# Patient Record
Sex: FEMALE | Race: BLACK OR AFRICAN AMERICAN | NOT HISPANIC OR LATINO | URBAN - METROPOLITAN AREA
[De-identification: names, ages, dates, MRNs, and addresses within clinical notes are randomized per-mention and may not be internally consistent; named-entity substitution may affect disease eponyms.]

---

## 2023-04-04 ENCOUNTER — INPATIENT (INPATIENT)
Facility: HOSPITAL | Age: 73
LOS: 10 days | Discharge: HOME | End: 2023-04-15
Attending: STUDENT IN AN ORGANIZED HEALTH CARE EDUCATION/TRAINING PROGRAM | Admitting: STUDENT IN AN ORGANIZED HEALTH CARE EDUCATION/TRAINING PROGRAM
Payer: MEDICARE

## 2023-04-04 VITALS
OXYGEN SATURATION: 98 % | RESPIRATION RATE: 20 BRPM | HEART RATE: 99 BPM | DIASTOLIC BLOOD PRESSURE: 80 MMHG | SYSTOLIC BLOOD PRESSURE: 125 MMHG | TEMPERATURE: 97 F

## 2023-04-04 LAB
ALBUMIN SERPL ELPH-MCNC: 4.1 G/DL — SIGNIFICANT CHANGE UP (ref 3.3–5)
ALP SERPL-CCNC: 133 U/L — HIGH (ref 40–120)
ALT FLD-CCNC: 72 U/L — HIGH (ref 4–33)
ANION GAP SERPL CALC-SCNC: 17 MMOL/L — HIGH (ref 7–14)
APTT BLD: 26.9 SEC — LOW (ref 27–36.3)
AST SERPL-CCNC: 64 U/L — HIGH (ref 4–32)
B PERT DNA SPEC QL NAA+PROBE: SIGNIFICANT CHANGE UP
B PERT+PARAPERT DNA PNL SPEC NAA+PROBE: SIGNIFICANT CHANGE UP
BASE EXCESS BLDV CALC-SCNC: -1.5 MMOL/L — SIGNIFICANT CHANGE UP (ref -2–3)
BASOPHILS # BLD AUTO: 0.01 K/UL — SIGNIFICANT CHANGE UP (ref 0–0.2)
BASOPHILS NFR BLD AUTO: 0.1 % — SIGNIFICANT CHANGE UP (ref 0–2)
BILIRUB SERPL-MCNC: 1 MG/DL — SIGNIFICANT CHANGE UP (ref 0.2–1.2)
BLOOD GAS VENOUS COMPREHENSIVE RESULT: SIGNIFICANT CHANGE UP
BORDETELLA PARAPERTUSSIS (RAPRVP): SIGNIFICANT CHANGE UP
BUN SERPL-MCNC: 35 MG/DL — HIGH (ref 7–23)
C PNEUM DNA SPEC QL NAA+PROBE: SIGNIFICANT CHANGE UP
CALCIUM SERPL-MCNC: 9.7 MG/DL — SIGNIFICANT CHANGE UP (ref 8.4–10.5)
CHLORIDE BLDV-SCNC: 99 MMOL/L — SIGNIFICANT CHANGE UP (ref 96–108)
CHLORIDE SERPL-SCNC: 96 MMOL/L — LOW (ref 98–107)
CO2 BLDV-SCNC: 22.2 MMOL/L — SIGNIFICANT CHANGE UP (ref 22–26)
CO2 SERPL-SCNC: 21 MMOL/L — LOW (ref 22–31)
CREAT SERPL-MCNC: 2.01 MG/DL — HIGH (ref 0.5–1.3)
EGFR: 26 ML/MIN/1.73M2 — LOW
EOSINOPHIL # BLD AUTO: 0.02 K/UL — SIGNIFICANT CHANGE UP (ref 0–0.5)
EOSINOPHIL NFR BLD AUTO: 0.2 % — SIGNIFICANT CHANGE UP (ref 0–6)
FLUAV SUBTYP SPEC NAA+PROBE: SIGNIFICANT CHANGE UP
FLUBV RNA SPEC QL NAA+PROBE: SIGNIFICANT CHANGE UP
GAS PNL BLDV: 130 MMOL/L — LOW (ref 136–145)
GLUCOSE BLDV-MCNC: 125 MG/DL — HIGH (ref 70–99)
GLUCOSE SERPL-MCNC: 129 MG/DL — HIGH (ref 70–99)
HADV DNA SPEC QL NAA+PROBE: SIGNIFICANT CHANGE UP
HCO3 BLDV-SCNC: 21 MMOL/L — LOW (ref 22–29)
HCOV 229E RNA SPEC QL NAA+PROBE: SIGNIFICANT CHANGE UP
HCOV HKU1 RNA SPEC QL NAA+PROBE: SIGNIFICANT CHANGE UP
HCOV NL63 RNA SPEC QL NAA+PROBE: SIGNIFICANT CHANGE UP
HCOV OC43 RNA SPEC QL NAA+PROBE: SIGNIFICANT CHANGE UP
HCT VFR BLD CALC: 41.4 % — SIGNIFICANT CHANGE UP (ref 34.5–45)
HCT VFR BLDA CALC: 42 % — SIGNIFICANT CHANGE UP (ref 34.5–46.5)
HGB BLD CALC-MCNC: 13.9 G/DL — SIGNIFICANT CHANGE UP (ref 11.7–16.1)
HGB BLD-MCNC: 13.7 G/DL — SIGNIFICANT CHANGE UP (ref 11.5–15.5)
HMPV RNA SPEC QL NAA+PROBE: SIGNIFICANT CHANGE UP
HPIV1 RNA SPEC QL NAA+PROBE: SIGNIFICANT CHANGE UP
HPIV2 RNA SPEC QL NAA+PROBE: SIGNIFICANT CHANGE UP
HPIV3 RNA SPEC QL NAA+PROBE: SIGNIFICANT CHANGE UP
HPIV4 RNA SPEC QL NAA+PROBE: SIGNIFICANT CHANGE UP
IANC: 6.04 K/UL — SIGNIFICANT CHANGE UP (ref 1.8–7.4)
IMM GRANULOCYTES NFR BLD AUTO: 0.4 % — SIGNIFICANT CHANGE UP (ref 0–0.9)
INR BLD: 1.29 RATIO — HIGH (ref 0.88–1.16)
LACTATE BLDV-MCNC: 2.2 MMOL/L — HIGH (ref 0.5–2)
LYMPHOCYTES # BLD AUTO: 1.18 K/UL — SIGNIFICANT CHANGE UP (ref 1–3.3)
LYMPHOCYTES # BLD AUTO: 14.3 % — SIGNIFICANT CHANGE UP (ref 13–44)
M PNEUMO DNA SPEC QL NAA+PROBE: SIGNIFICANT CHANGE UP
MCHC RBC-ENTMCNC: 27 PG — SIGNIFICANT CHANGE UP (ref 27–34)
MCHC RBC-ENTMCNC: 33.1 GM/DL — SIGNIFICANT CHANGE UP (ref 32–36)
MCV RBC AUTO: 81.7 FL — SIGNIFICANT CHANGE UP (ref 80–100)
MONOCYTES # BLD AUTO: 0.98 K/UL — HIGH (ref 0–0.9)
MONOCYTES NFR BLD AUTO: 11.9 % — SIGNIFICANT CHANGE UP (ref 2–14)
NEUTROPHILS # BLD AUTO: 6.04 K/UL — SIGNIFICANT CHANGE UP (ref 1.8–7.4)
NEUTROPHILS NFR BLD AUTO: 73.1 % — SIGNIFICANT CHANGE UP (ref 43–77)
NRBC # BLD: 0 /100 WBCS — SIGNIFICANT CHANGE UP (ref 0–0)
NRBC # FLD: 0 K/UL — SIGNIFICANT CHANGE UP (ref 0–0)
NT-PROBNP SERPL-SCNC: 61 PG/ML — SIGNIFICANT CHANGE UP
PCO2 BLDV: 30 MMHG — LOW (ref 39–52)
PH BLDV: 7.46 — HIGH (ref 7.32–7.43)
PLATELET # BLD AUTO: 345 K/UL — SIGNIFICANT CHANGE UP (ref 150–400)
PO2 BLDV: 23 MMHG — LOW (ref 25–45)
POTASSIUM BLDV-SCNC: 3.1 MMOL/L — LOW (ref 3.5–5.1)
POTASSIUM SERPL-MCNC: 3.3 MMOL/L — LOW (ref 3.5–5.3)
POTASSIUM SERPL-SCNC: 3.3 MMOL/L — LOW (ref 3.5–5.3)
PROT SERPL-MCNC: 7.5 G/DL — SIGNIFICANT CHANGE UP (ref 6–8.3)
PROTHROM AB SERPL-ACNC: 15 SEC — HIGH (ref 10.5–13.4)
RAPID RVP RESULT: SIGNIFICANT CHANGE UP
RBC # BLD: 5.07 M/UL — SIGNIFICANT CHANGE UP (ref 3.8–5.2)
RBC # FLD: 12.8 % — SIGNIFICANT CHANGE UP (ref 10.3–14.5)
RSV RNA SPEC QL NAA+PROBE: SIGNIFICANT CHANGE UP
RV+EV RNA SPEC QL NAA+PROBE: SIGNIFICANT CHANGE UP
SAO2 % BLDV: 34.4 % — LOW (ref 67–88)
SARS-COV-2 RNA SPEC QL NAA+PROBE: SIGNIFICANT CHANGE UP
SODIUM SERPL-SCNC: 134 MMOL/L — LOW (ref 135–145)
TROPONIN T, HIGH SENSITIVITY RESULT: 14 NG/L — SIGNIFICANT CHANGE UP
TROPONIN T, HIGH SENSITIVITY RESULT: 19 NG/L — SIGNIFICANT CHANGE UP
WBC # BLD: 8.26 K/UL — SIGNIFICANT CHANGE UP (ref 3.8–10.5)
WBC # FLD AUTO: 8.26 K/UL — SIGNIFICANT CHANGE UP (ref 3.8–10.5)

## 2023-04-04 PROCEDURE — 74177 CT ABD & PELVIS W/CONTRAST: CPT | Mod: 26,MA

## 2023-04-04 PROCEDURE — 71275 CT ANGIOGRAPHY CHEST: CPT | Mod: 26,MA

## 2023-04-04 PROCEDURE — 71045 X-RAY EXAM CHEST 1 VIEW: CPT | Mod: 26

## 2023-04-04 PROCEDURE — 99285 EMERGENCY DEPT VISIT HI MDM: CPT

## 2023-04-04 RX ORDER — MORPHINE SULFATE 50 MG/1
4 CAPSULE, EXTENDED RELEASE ORAL ONCE
Refills: 0 | Status: DISCONTINUED | OUTPATIENT
Start: 2023-04-04 | End: 2023-04-04

## 2023-04-04 RX ORDER — SODIUM CHLORIDE 9 MG/ML
1000 INJECTION INTRAMUSCULAR; INTRAVENOUS; SUBCUTANEOUS ONCE
Refills: 0 | Status: COMPLETED | OUTPATIENT
Start: 2023-04-04 | End: 2023-04-04

## 2023-04-04 RX ADMIN — MORPHINE SULFATE 4 MILLIGRAM(S): 50 CAPSULE, EXTENDED RELEASE ORAL at 19:00

## 2023-04-04 RX ADMIN — MORPHINE SULFATE 4 MILLIGRAM(S): 50 CAPSULE, EXTENDED RELEASE ORAL at 18:27

## 2023-04-04 RX ADMIN — SODIUM CHLORIDE 1000 MILLILITER(S): 9 INJECTION INTRAMUSCULAR; INTRAVENOUS; SUBCUTANEOUS at 19:39

## 2023-04-04 NOTE — ED ADULT NURSE NOTE - CHIEF COMPLAINT QUOTE
Pt with co abdominal pain x few days recently had colonoscopy diagnosed with colon mass . Pt has oncology appointment on the 19 of april at [facility] but states wants to  start her chemo here at [facility]. Pt also reports sob and does sabrina tachypneic in triage. Pt with HX of copd.

## 2023-04-04 NOTE — ED ADULT TRIAGE NOTE - CHIEF COMPLAINT QUOTE
Pt with co abdominal pain x few days recently had colonoscopy diagnosed with colon mass . Pt has oncology appointment on the 19 of april at [facility] but states wants to  start her chemo here at [facility]. Pt also reports sob and does sabrina tachypneic in triage. Pt with HX of copd.

## 2023-04-04 NOTE — ED ADULT NURSE NOTE - PRIMARY CARE PROVIDER
"GLIMEPIRIDE 4 MG TABLET   Last Written Prescription Date:  3/9/2020  Last Fill Quantity: 60,  # refills: 0   Last office visit: 9/12/2019 with prescribing provider:  Geovany Perkins MD   Future Office Visit:  NA      Requested Prescriptions   Pending Prescriptions Disp Refills     glimepiride (AMARYL) 4 MG tablet [Pharmacy Med Name: GLIMEPIRIDE 4 MG TABLET] 60 tablet 0     Sig: TAKE 2 TABLETS (8 MG) BY MOUTH EVERY MORNING (BEFORE BREAKFAST)       Sulfonylurea Agents Failed - 4/2/2020  8:37 AM   /     Failed - Patient has documented A1c within the specified period of time.     If HgbA1C is 8 or greater, it needs to be on file within the past 3 months.  If less than 8, must be on file within the past 6 months.     Recent Labs   Lab Test 09/12/19  0943   A1C 7.1*             Failed - Recent (6 mo) or future (30 days) visit within the authorizing provider's specialty     Patient had office visit in the last 6 months or has a visit in the next 30 days with authorizing provider or within the authorizing provider's specialty.  See \"Patient Info\" tab in inbasket, or \"Choose Columns\" in Meds & Orders section of the refill encounter.            Passed - Medication is active on med list        Passed - Patient is age 18 or older        Passed - Patient has a recent creatinine (normal) within the past 12 mos.     Recent Labs   Lab Test 09/12/19  0943   CR 0.82       Ok to refill medication if creatinine is low               "
Routing refill request to provider for review/approval because:  Patient due for DM check/labs - do you want telephone visit or defer?    Ailyn Lopez RN  [facility]        
The prescription(s) has been sent to the requested pharmacy.  Please notify the patient if needed.   
Unknown

## 2023-04-04 NOTE — ED PROVIDER NOTE - CLINICAL SUMMARY MEDICAL DECISION MAKING FREE TEXT BOX
[place] - Patient is a 72-year-old female with a history of hypertension, COPD (9/11 ), chronic bronchitis who presents to the ED with shortness of breath. Concern for PE vs PNA vs pleural effusion less likely. no wheezing, acute copd less likely. Concern for worsening cancer. will check labs, CT PE, CT abd, admit. pain control with morphine

## 2023-04-04 NOTE — ED ADULT NURSE NOTE - NSIMPLEMENTINTERV_GEN_ALL_ED
Implemented All Fall Risk Interventions:  [place] to call system. Call bell, personal items and telephone within reach. Instruct patient to call for assistance. Room bathroom lighting operational. Non-slip footwear when patient is off stretcher. Physically safe environment: no spills, clutter or unnecessary equipment. Stretcher in lowest position, wheels locked, appropriate side rails in place. Provide visual cue, wrist band, yellow gown, etc. Monitor gait and stability. Monitor for mental status changes and reorient to person, place, and time. Review medications for side effects contributing to fall risk. Reinforce activity limits and safety measures with patient and family.

## 2023-04-04 NOTE — ED PROVIDER NOTE - PHYSICAL EXAMINATION
Lazara Alonso MD  GENERAL: Patient awake alert NAD.  HEENT: NC/AT, Moist mucous membranes, EOMI.  LUNGS: CTAB, no wheezes or crackles. satting 100% on RA but tachypneic  CARDIAC: RRR, no m/r/g.    ABDOMEN: Soft, generally tender, ND, No rebound, guarding.   EXT: No edema. No calf tenderness.   MSK: No pain with movement, no deformities.  NEURO: A&Ox3. Moving all extremities.  SKIN: Warm and dry. No rash.  PSYCH: Normal affect.

## 2023-04-04 NOTE — ED PROVIDER NOTE - ATTENDING CONTRIBUTION TO CARE
The patient is a 72y Female who has a past medical and surgery history of HTN COPD hypertension, COPD/chronic bronchitis PTED with shortness of breath after PTED after she presented to [facility] where she was diagnosed with possible colon cancer with mets to the abdominal wall. She was discharged to arrange outpatient onc followup but wanted to transition her care here Patient continues to have abd pain, N/V/D. Did not take pain meds today. Is not on AC. No leg swelling or calf pain   Vital Signs Last 24 Hrs  T(F): 97.4 HR: 104 BP: 123/74 RR: 17 SpO2: 98% (04 Apr 2023 17:30) (98% - 98%)  Parameters below as of 04 Apr 2023 17:30  Patient On (Oxygen Delivery Method): room air    PE: as described; my additions and exceptions are noted in the chart  DATA:  EKG: pending at time of evaluation  LAB: Pending at time of evaluation  Blood Gas Venous - Lactate: 2.2 mmol/L (04-04-23 @ 18:27)                        13.7   8.26  )-----------( 345      ( 04 Apr 2023 18:27 )             41.4   Mean Cell Volume: 81.7 fL (04-04-23 @ 18:27)  Auto Neutrophil %: 73.1 % (04-04-23 @ 18:27)  Auto Eosinophil %: 0.2 % (04-04-23 @ 18:27)  PT/INR - ( 04 Apr 2023 18:27 )   PT: 15.0 sec;   INR: 1.29 ratio         PTT - ( 04 Apr 2023 18:27 )  PTT:26.9 [id number]    134<L>  |  96<L>  |  35<H>  ----------------------------<  129<H>  3.3<L>   |  21<L>  |  2.01<H>    Ca    9.7      04 Apr 2023 18:27    TPro  7.5  /  Alb  4.1  /  TBili  1.0  /  DBili  x   /  AST  64<H>  /  ALT  72<H>  /  AlkPhos  133<H>  04-04    Troponin T, High Sensitivity Result: 14 ng/L (04-04-23 @ 20:51)  Troponin T, High Sensitivity Result: 19 ng/L (04-04-23 @ 18:27)   CT results No pulmonary embolism.  Small bowel obstruction with the transition point at ileocecal junction w/ thickening of the cecum and proximal ascending colon c/w history of colonic neoplasm ? implanted tumor in lower anterior abdominal wall   IMPRESSION/RISK:  Dx=SBO     Consideration include TBA for workup of colonic neoplasm while treating obstruction   Plan  NPO   IVFs   analgesics  Sx consult TBA

## 2023-04-04 NOTE — ED PROVIDER NOTE - PROGRESS NOTE DETAILS
Tyrone MCNAMARA PGY2: Called by radiology CT scan showing bowel obstruction with mass and transition point spoke to surgery who will come see the patient. Tyrone MCNAMARA PGY2: spoke to surg who will admit pt.

## 2023-04-04 NOTE — ED ADULT NURSE NOTE - OBJECTIVE STATEMENT
Patient received in room 29. Patient A&Ox4 and ambulatory with walker at baseline. Patient presents to the ED c/o shortness of breath and abdominal pain. PMH HTN and PSH hernia surgery. Patient states she was recently diagnosed with colon cancer approximately 2 weeks ago. Patient endorses abdominal pain, nausea/vomiting, and diarrhea for approximately one month; abdomen flat, soft and nondistended; patient endorses tenderness to all four quadrants. Patient states she has had decreased PO intake for approximately one month too due to diarrhea and nausea/vomiting. Patient states she has been short of breath with a sudden onset approximately five days ago; patient able to speak in complete, uninterrupted sentences. Airway patent, chest rise equal bilaterally, lung sounds clear and equal bilaterally, respirations shallow and labored. Patient SPO2 sating above 95% on room air; patient placed on 2 LPM via nasal cannula for comfort. Patient denies chest pain. Pulse/motor/sensory present and no edema noted to all four extremities. Steady gait observed, safety measures in place. Patient received in room 29. Patient A&Ox4 and ambulatory with walker at baseline. Patient presents to the ED c/o shortness of breath and abdominal pain. PMH HTN and PSH hernia surgery. Patient states she was recently diagnosed with colon cancer approximately 2 weeks ago and has not begun treatment. Patient endorses abdominal pain, nausea/vomiting, and diarrhea for approximately one month; abdomen flat, soft and nondistended; patient endorses tenderness to all four quadrants. Patient states she has had decreased PO intake for approximately one month too due to diarrhea and nausea/vomiting. Patient states she has been short of breath with a sudden onset approximately five days ago; patient able to speak in complete, uninterrupted sentences. Airway patent, chest rise equal bilaterally, lung sounds clear and equal bilaterally, respirations shallow and labored. Patient SPO2 sating above 95% on room air; patient placed on 2 LPM via nasal cannula for comfort. Patient denies chest pain. Pulse/motor/sensory present and no edema noted to all four extremities. Steady gait observed, safety measures in place.

## 2023-04-04 NOTE — ED PROVIDER NOTE - OBJECTIVE STATEMENT
Patient is a 72-year-old female with a history of hypertension, COPD (9/11 ), chronic bronchitis who presents to the ED with shortness of breath. In March, she started to have weight loss (at least 10 pounds) and daily diarrhea as well as nausea and occasional nonbloody nonbilious emesis.    She went to the ED at an outside hospital where she was diagnosed with possible colon cancer with mets to the abdominal wall. She was admitted March 20 March 30.  After being discharged, she became progressively more short of breath.  She endorses occasional mild chest pain. Denies fevers, chills. Continues to have abd pain, N/V/D. Did not take pain meds today. Is not on AC. No leg swelling or calf pain

## 2023-04-05 DIAGNOSIS — K56.609 UNSPECIFIED INTESTINAL OBSTRUCTION, UNSPECIFIED AS TO PARTIAL VERSUS COMPLETE OBSTRUCTION: ICD-10-CM

## 2023-04-05 LAB
ANION GAP SERPL CALC-SCNC: 17 MMOL/L — HIGH (ref 7–14)
BUN SERPL-MCNC: 30 MG/DL — HIGH (ref 7–23)
CALCIUM SERPL-MCNC: 8.4 MG/DL — SIGNIFICANT CHANGE UP (ref 8.4–10.5)
CEA SERPL-MCNC: 11.7 NG/ML — HIGH (ref 1–3.8)
CHLORIDE SERPL-SCNC: 96 MMOL/L — LOW (ref 98–107)
CO2 SERPL-SCNC: 19 MMOL/L — LOW (ref 22–31)
CREAT SERPL-MCNC: 1.18 MG/DL — SIGNIFICANT CHANGE UP (ref 0.5–1.3)
EGFR: 49 ML/MIN/1.73M2 — LOW
GLUCOSE BLDC GLUCOMTR-MCNC: 86 MG/DL — SIGNIFICANT CHANGE UP (ref 70–99)
GLUCOSE SERPL-MCNC: 97 MG/DL — SIGNIFICANT CHANGE UP (ref 70–99)
HCT VFR BLD CALC: 35.8 % — SIGNIFICANT CHANGE UP (ref 34.5–45)
HCV AB S/CO SERPL IA: 0.11 S/CO — SIGNIFICANT CHANGE UP (ref 0–0.99)
HCV AB SERPL-IMP: SIGNIFICANT CHANGE UP
HGB BLD-MCNC: 11.8 G/DL — SIGNIFICANT CHANGE UP (ref 11.5–15.5)
MAGNESIUM SERPL-MCNC: 1.9 MG/DL — SIGNIFICANT CHANGE UP (ref 1.6–2.6)
MCHC RBC-ENTMCNC: 27.4 PG — SIGNIFICANT CHANGE UP (ref 27–34)
MCHC RBC-ENTMCNC: 33 GM/DL — SIGNIFICANT CHANGE UP (ref 32–36)
MCV RBC AUTO: 83.3 FL — SIGNIFICANT CHANGE UP (ref 80–100)
NRBC # BLD: 0 /100 WBCS — SIGNIFICANT CHANGE UP (ref 0–0)
NRBC # FLD: 0 K/UL — SIGNIFICANT CHANGE UP (ref 0–0)
PHOSPHATE SERPL-MCNC: 4.4 MG/DL — SIGNIFICANT CHANGE UP (ref 2.5–4.5)
PLATELET # BLD AUTO: 281 K/UL — SIGNIFICANT CHANGE UP (ref 150–400)
POTASSIUM SERPL-MCNC: 3.1 MMOL/L — LOW (ref 3.5–5.3)
POTASSIUM SERPL-SCNC: 3.1 MMOL/L — LOW (ref 3.5–5.3)
RBC # BLD: 4.3 M/UL — SIGNIFICANT CHANGE UP (ref 3.8–5.2)
RBC # FLD: 13 % — SIGNIFICANT CHANGE UP (ref 10.3–14.5)
SODIUM SERPL-SCNC: 132 MMOL/L — LOW (ref 135–145)
WBC # BLD: 6.33 K/UL — SIGNIFICANT CHANGE UP (ref 3.8–10.5)
WBC # FLD AUTO: 6.33 K/UL — SIGNIFICANT CHANGE UP (ref 3.8–10.5)

## 2023-04-05 PROCEDURE — 99223 1ST HOSP IP/OBS HIGH 75: CPT

## 2023-04-05 PROCEDURE — 71045 X-RAY EXAM CHEST 1 VIEW: CPT | Mod: 26

## 2023-04-05 RX ORDER — SODIUM CHLORIDE 9 MG/ML
1000 INJECTION, SOLUTION INTRAVENOUS
Refills: 0 | Status: DISCONTINUED | OUTPATIENT
Start: 2023-04-05 | End: 2023-04-05

## 2023-04-05 RX ORDER — ALBUTEROL 90 UG/1
2 AEROSOL, METERED ORAL EVERY 6 HOURS
Refills: 0 | Status: DISCONTINUED | OUTPATIENT
Start: 2023-04-05 | End: 2023-04-15

## 2023-04-05 RX ORDER — POTASSIUM CHLORIDE 20 MEQ
10 PACKET (EA) ORAL
Refills: 0 | Status: COMPLETED | OUTPATIENT
Start: 2023-04-05 | End: 2023-04-05

## 2023-04-05 RX ORDER — ENOXAPARIN SODIUM 100 MG/ML
40 INJECTION SUBCUTANEOUS EVERY 24 HOURS
Refills: 0 | Status: DISCONTINUED | OUTPATIENT
Start: 2023-04-05 | End: 2023-04-11

## 2023-04-05 RX ORDER — SODIUM CHLORIDE 9 MG/ML
1000 INJECTION, SOLUTION INTRAVENOUS
Refills: 0 | Status: DISCONTINUED | OUTPATIENT
Start: 2023-04-05 | End: 2023-04-08

## 2023-04-05 RX ORDER — OMEPRAZOLE 10 MG/1
1 CAPSULE, DELAYED RELEASE ORAL
Refills: 0 | DISCHARGE

## 2023-04-05 RX ORDER — SODIUM CHLORIDE 9 MG/ML
1000 INJECTION, SOLUTION INTRAVENOUS ONCE
Refills: 0 | Status: DISCONTINUED | OUTPATIENT
Start: 2023-04-05 | End: 2023-04-05

## 2023-04-05 RX ORDER — BUDESONIDE AND FORMOTEROL FUMARATE DIHYDRATE 160; 4.5 UG/1; UG/1
2 AEROSOL RESPIRATORY (INHALATION)
Refills: 0 | Status: DISCONTINUED | OUTPATIENT
Start: 2023-04-05 | End: 2023-04-15

## 2023-04-05 RX ORDER — TIOTROPIUM BROMIDE 18 UG/1
1 CAPSULE ORAL; RESPIRATORY (INHALATION) DAILY
Refills: 0 | Status: DISCONTINUED | OUTPATIENT
Start: 2023-04-05 | End: 2023-04-15

## 2023-04-05 RX ORDER — SODIUM CHLORIDE 9 MG/ML
1000 INJECTION, SOLUTION INTRAVENOUS ONCE
Refills: 0 | Status: COMPLETED | OUTPATIENT
Start: 2023-04-05 | End: 2023-04-05

## 2023-04-05 RX ORDER — PANTOPRAZOLE SODIUM 20 MG/1
40 TABLET, DELAYED RELEASE ORAL EVERY 24 HOURS
Refills: 0 | Status: DISCONTINUED | OUTPATIENT
Start: 2023-04-05 | End: 2023-04-14

## 2023-04-05 RX ADMIN — Medication 100 MILLIEQUIVALENT(S): at 10:19

## 2023-04-05 RX ADMIN — SODIUM CHLORIDE 1000 MILLILITER(S): 9 INJECTION, SOLUTION INTRAVENOUS at 01:46

## 2023-04-05 RX ADMIN — SODIUM CHLORIDE 125 MILLILITER(S): 9 INJECTION, SOLUTION INTRAVENOUS at 11:25

## 2023-04-05 RX ADMIN — ENOXAPARIN SODIUM 40 MILLIGRAM(S): 100 INJECTION SUBCUTANEOUS at 23:39

## 2023-04-05 RX ADMIN — PANTOPRAZOLE SODIUM 40 MILLIGRAM(S): 20 TABLET, DELAYED RELEASE ORAL at 10:18

## 2023-04-05 RX ADMIN — Medication 100 MILLIEQUIVALENT(S): at 12:39

## 2023-04-05 RX ADMIN — Medication 100 MILLIEQUIVALENT(S): at 11:25

## 2023-04-05 RX ADMIN — SODIUM CHLORIDE 125 MILLILITER(S): 9 INJECTION, SOLUTION INTRAVENOUS at 15:38

## 2023-04-05 RX ADMIN — SODIUM CHLORIDE 125 MILLILITER(S): 9 INJECTION, SOLUTION INTRAVENOUS at 03:29
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
## 2023-04-05 NOTE — H&P ADULT - HISTORY OF PRESENT ILLNESS
72F pmh HTN, COPD (worked at [employer], no hx tobacco use), open ccx, incisional hernia repair with mesh, presenting with abdominal discomfort of since 3/12.    Patient reports diarrhea progressing to blood in bm on 3/12. Also had difficulty eating with n/v. She went to [facility] where a CT showed a mass in the cecum. she underwent colonoscopy (dr. Danny Parisi) with bx which came back as malignant per patient. She also had an abdominal wall implant that was bx but does not know the results. She was discharged from that hospital two days prior to presentation at [facility] and wanted to pursue care at [facility] to be closer to her daughter. She reports no flatus for past two days with only liquid stool. Denies weight loss. No family history of colon cancer.

## 2023-04-05 NOTE — H&P ADULT - NSHPPHYSICALEXAM_GEN_ALL_CORE
Vital Signs Last 24 Hrs  T(C): 36.4 (05 Apr 2023 01:00), Max: 37.3 (04 Apr 2023 19:20)  T(F): 97.5 (05 Apr 2023 01:00), Max: 99.2 (04 Apr 2023 19:20)  HR: 87 (05 Apr 2023 01:00) (81 - 104)  BP: 106/69 (05 Apr 2023 01:00) (105/62 - 127/94)  BP(mean): --  RR: 18 (05 Apr 2023 01:00) (17 - 20)  SpO2: 97% (05 Apr 2023 01:00) (95% - 99%)    Parameters below as of 05 Apr 2023 01:00  Patient On (Oxygen Delivery Method): room air    General: well developed, well nourished, NAD  Neuro: alert and oriented, no focal deficits, moves all extremities spontaneously  HEENT: NCAT, EOMI, anicteric, mucosa moist  Respiratory: airway patent, respirations unlabored  CVS: regular rate  Abdomen: soft, distended, nontender  Extremities: no edema, sensation and movement grossly intact  Skin: warm, dry, appropriate color

## 2023-04-05 NOTE — H&P ADULT - ASSESSMENT
72F pmh HTN, COPD (worked at [employer], no hx tobacco use), open ccx, incisional hernia repair with mesh, presenting with abdominal discomfort of since 3/12. 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    Plan:  - Admit to Dr. Salguero  - NPO, NGT, IVF  - Need to obtain outpatient records, pathology of abdominal wall implant and colonoscopy, colonoscopy report, ECHO report, and any pulmonary testing  - Home medications  - Medicine consult for optimization for potential surgery  - FU CEA    Plan discussed with Dr. Antunez on behalf of Dr. Noemy NICOLE Team Surgery  [id number]

## 2023-04-05 NOTE — H&P ADULT - ATTENDING COMMENTS
Pt w/ cecal mass biopsied and confirmed adenocarcinoma but pt wants to transfer her care here  Pt w/ obstruction on CT and no recent bowel function    - admit  - NPO and IVFs  - NG tube to LIS  - will work on obtaining OSH records    Wendy Salguero MD  Attending Physician

## 2023-04-05 NOTE — PATIENT PROFILE ADULT - FALL HARM RISK - HARM RISK INTERVENTIONS
Assistance with ambulation/Assistance OOB with selected safe patient handling equipment/Communicate Risk of Fall with Harm to all staff/Discuss with provider need for PT consult/Monitor gait and stability/Provide patient with walking aids - walker, cane, crutches/Reinforce activity limits and safety measures with patient and family/Review medications for side effects contributing to fall risk/Sit up slowly, dangle for a short time, stand at bedside before walking/Tailored Fall Risk Interventions/Visual Cue: Yellow wristband and red socks/Bed in lowest position, wheels locked, appropriate side rails in place/Call bell, personal items and telephone in reach/Instruct patient to call for assistance before getting out of bed or chair/Non-slip footwear when patient is out of bed/[place] to call system/Physically safe environment - no spills, clutter or unnecessary equipment/Purposeful Proactive Rounding/Room/bathroom lighting operational, light cord in reach

## 2023-04-06 LAB
ANION GAP SERPL CALC-SCNC: 12 MMOL/L — SIGNIFICANT CHANGE UP (ref 7–14)
BUN SERPL-MCNC: 18 MG/DL — SIGNIFICANT CHANGE UP (ref 7–23)
CALCIUM SERPL-MCNC: 8.2 MG/DL — LOW (ref 8.4–10.5)
CEA SERPL-MCNC: 12.4 NG/ML — HIGH (ref 1–3.8)
CHLORIDE SERPL-SCNC: 102 MMOL/L — SIGNIFICANT CHANGE UP (ref 98–107)
CO2 SERPL-SCNC: 21 MMOL/L — LOW (ref 22–31)
CREAT SERPL-MCNC: 0.75 MG/DL — SIGNIFICANT CHANGE UP (ref 0.5–1.3)
EGFR: 85 ML/MIN/1.73M2 — SIGNIFICANT CHANGE UP
GLUCOSE BLDC GLUCOMTR-MCNC: 76 MG/DL — SIGNIFICANT CHANGE UP (ref 70–99)
GLUCOSE SERPL-MCNC: 74 MG/DL — SIGNIFICANT CHANGE UP (ref 70–99)
HCT VFR BLD CALC: 34.2 % — LOW (ref 34.5–45)
HGB BLD-MCNC: 11 G/DL — LOW (ref 11.5–15.5)
MAGNESIUM SERPL-MCNC: 1.9 MG/DL — SIGNIFICANT CHANGE UP (ref 1.6–2.6)
MCHC RBC-ENTMCNC: 27.3 PG — SIGNIFICANT CHANGE UP (ref 27–34)
MCHC RBC-ENTMCNC: 32.2 GM/DL — SIGNIFICANT CHANGE UP (ref 32–36)
MCV RBC AUTO: 84.9 FL — SIGNIFICANT CHANGE UP (ref 80–100)
NRBC # BLD: 0 /100 WBCS — SIGNIFICANT CHANGE UP (ref 0–0)
NRBC # FLD: 0 K/UL — SIGNIFICANT CHANGE UP (ref 0–0)
PHOSPHATE SERPL-MCNC: 3.6 MG/DL — SIGNIFICANT CHANGE UP (ref 2.5–4.5)
PLATELET # BLD AUTO: 260 K/UL — SIGNIFICANT CHANGE UP (ref 150–400)
POTASSIUM SERPL-MCNC: 3.6 MMOL/L — SIGNIFICANT CHANGE UP (ref 3.5–5.3)
POTASSIUM SERPL-SCNC: 3.6 MMOL/L — SIGNIFICANT CHANGE UP (ref 3.5–5.3)
RBC # BLD: 4.03 M/UL — SIGNIFICANT CHANGE UP (ref 3.8–5.2)
RBC # FLD: 12.9 % — SIGNIFICANT CHANGE UP (ref 10.3–14.5)
SODIUM SERPL-SCNC: 135 MMOL/L — SIGNIFICANT CHANGE UP (ref 135–145)
WBC # BLD: 5.75 K/UL — SIGNIFICANT CHANGE UP (ref 3.8–10.5)
WBC # FLD AUTO: 5.75 K/UL — SIGNIFICANT CHANGE UP (ref 3.8–10.5)

## 2023-04-06 PROCEDURE — 74018 RADEX ABDOMEN 1 VIEW: CPT | Mod: 26

## 2023-04-06 PROCEDURE — 99231 SBSQ HOSP IP/OBS SF/LOW 25: CPT

## 2023-04-06 PROCEDURE — 74177 CT ABD & PELVIS W/CONTRAST: CPT | Mod: 26

## 2023-04-06 RX ORDER — POTASSIUM CHLORIDE 20 MEQ
10 PACKET (EA) ORAL
Refills: 0 | Status: COMPLETED | OUTPATIENT
Start: 2023-04-06 | End: 2023-04-06

## 2023-04-06 RX ADMIN — PANTOPRAZOLE SODIUM 40 MILLIGRAM(S): 20 TABLET, DELAYED RELEASE ORAL at 09:51

## 2023-04-06 RX ADMIN — SODIUM CHLORIDE 125 MILLILITER(S): 9 INJECTION, SOLUTION INTRAVENOUS at 01:55

## 2023-04-06 RX ADMIN — Medication 100 MILLIEQUIVALENT(S): at 10:01

## 2023-04-06 RX ADMIN — Medication 100 MILLIEQUIVALENT(S): at 10:45

## 2023-04-06 RX ADMIN — Medication 100 MILLIEQUIVALENT(S): at 12:03

## 2023-04-06 NOTE — PROGRESS NOTE ADULT - ASSESSMENT
72F pmh HTN, COPD (worked at [employer], no hx tobacco use), open ccx, incisional hernia repair with mesh, presenting with abdominal discomfort of since 3/12. 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    Plan:  - NPO/IVF/NGT   - CT with PO and IV contrast to eval contrast passage to colon   - Need to obtain outpatient records, pathology of abdominal wall implant and colonoscopy, colonoscopy report, ECHO report, and any pulmonary testing  - Home medications  - Medicine consult for optimization for potential surgery    A Team Surgery [id number]

## 2023-04-06 NOTE — PROGRESS NOTE ADULT - ATTENDING COMMENTS
Pt reports having bowel function though continue NG output    - continue NPO, IVFs and NG tube  - obtain CT w/ PO and IV contrast w/ PO down NG and wait at least 3hrs after administration prior to obtaining CT  - f/u OSH records  - will decide on timing of surgery pending CT    Wendy Salguero MD  Attending Physician

## 2023-04-06 NOTE — DIETITIAN INITIAL EVALUATION ADULT - PERTINENT LABORATORY DATA
04-06    135  |  102  |  18  ----------------------------<  74  3.6   |  21<L>  |  0.75    Ca    8.2<L>      06 Apr 2023 06:00  Phos  3.6     04-06  Mg     1.90     04-06    TPro  7.5  /  Alb  4.1  /  TBili  1.0  /  DBili  x   /  AST  64<H>  /  ALT  72<H>  /  AlkPhos  133<H>  04-04  POCT Blood Glucose.: 76 mg/dL (04-06-23 @ 12:21)

## 2023-04-06 NOTE — DIETITIAN INITIAL EVALUATION ADULT - ORAL INTAKE PTA/DIET HISTORY
Patient reports usual body weight 237lbs, reports weight loss of 10lbs in a month, due to decrease appetite and po intake. Patient has no known food allergies.

## 2023-04-06 NOTE — PROGRESS NOTE ADULT - SUBJECTIVE AND OBJECTIVE BOX
Subjective:  No acute overnight events.   Patient seen and examined at bedside with surgical team during morning rounds.  Admits to NG tube discomfort and thirst. Abdominal pain is well controlled. Patient is passing gas and having bowel movements.    Exam  General: well developed, well nourished, NAD  HEENT: NCAT, NG tube in place   Respiratory: airway patent, respirations unlabored  Abdomen: soft, distended, nontender, surgical scar well healed   Extremities: no edema, sensation and movement grossly intact  Skin: warm, dry, appropriate color    T(C): 36.5 (04-06-23 @ 10:49), Max: 36.8 (04-06-23 @ 03:25)  HR: 83 (04-06-23 @ 10:49) (78 - 97)  BP: 136/59 (04-06-23 @ 10:49) (107/60 - 136/59)  RR: 18 (04-06-23 @ 10:49) (16 - 18)  SpO2: 99% (04-06-23 @ 10:49) (97% - 100%)      04-05-23 @ 07:01  -  04-06-23 @ 07:00  --------------------------------------------------------  IN: 1000 mL / OUT: 900 mL / NET: 100 mL        LABS:  cret                        11.0   5.75  )-----------( 260      ( 06 Apr 2023 06:00 )             34.2     04-06    135  |  102  |  18  ----------------------------<  74  3.6   |  21<L>  |  0.75    Ca    8.2<L>      06 Apr 2023 06:00  Phos  3.6     04-06  Mg     1.90     04-06    TPro  7.5  /  Alb  4.1  /  TBili  1.0  /  DBili  x   /  AST  64<H>  /  ALT  72<H>  /  AlkPhos  133<H>  04-04    PT/INR - ( 04 Apr 2023 18:27 )   PT: 15.0 sec;   INR: 1.29 ratio         PTT - ( 04 Apr 2023 18:27 )  PTT:26.9 sec      albuterol    90 MICROgram(s) HFA Inhaler 2 Puff(s) Inhalation every 6 hours PRN  budesonide 160 MICROgram(s)/formoterol 4.5 MICROgram(s) Inhaler 2 Puff(s) Inhalation two times a day  enoxaparin Injectable 40 milliGRAM(s) SubCutaneous every 24 hours  lactated ringers 1000 milliLiter(s) IV Continuous <Continuous>  pantoprazole  Injectable 40 milliGRAM(s) IV Push every 24 hours  potassium chloride  10 mEq/100 mL IVPB 10 milliEquivalent(s) IV Intermittent every 1 hour  tiotropium 2.5 MICROgram(s) Inhaler 1 Puff(s) Inhalation daily

## 2023-04-06 NOTE — DIETITIAN INITIAL EVALUATION ADULT - PERTINENT MEDS FT
MEDICATIONS  (STANDING):  budesonide 160 MICROgram(s)/formoterol 4.5 MICROgram(s) Inhaler 2 Puff(s) Inhalation two times a day  enoxaparin Injectable 40 milliGRAM(s) SubCutaneous every 24 hours  lactated ringers 1000 milliLiter(s) (125 mL/Hr) IV Continuous <Continuous>  pantoprazole  Injectable 40 milliGRAM(s) IV Push every 24 hours  tiotropium 2.5 MICROgram(s) Inhaler 1 Puff(s) Inhalation daily    MEDICATIONS  (PRN):  albuterol    90 MICROgram(s) HFA Inhaler 2 Puff(s) Inhalation every 6 hours PRN Shortness of Breath and/or Wheezing

## 2023-04-06 NOTE — DIETITIAN INITIAL EVALUATION ADULT - OTHER INFO
72F pmh HTN, COPD (worked at [employer], no hx tobacco use), open ccx, incisional hernia repair with mesh, presenting with abdominal discomfort of since 3/12. Found to have cecal mass causing sbo with possible abdominal wall metastasis, per chart.   Patient is NPO, on IV fluids for hydration during visit. Noted patient is on NGT. Patient reports feeling uncomfortable with the NGT, denies any nausea, vomiting, diarrhea, constipation during visit. Last bowel movement 4/6/2023, per RN flow sheet. Noted low K-3.1(4/5), on KCl for repletion, now WNL.

## 2023-04-06 NOTE — DIETITIAN INITIAL EVALUATION ADULT - ADD RECOMMEND
1. Advance po diet to clear liquid, when medically feasible. Defer diet consistency to team.  2. Once PO diet initiates, monitor po diet tolerance.  3. If patient to remain NPO or unable to use gut, suggest initiating alternate means of nutrition support.  4. Monitor labs and hydration status.   5. RD remains available, consult nutrition services if needed.

## 2023-04-07 VITALS — HEIGHT: 65 IN | BODY MASS INDEX: 36.91 KG/M2 | WEIGHT: 221.56 LBS

## 2023-04-07 DIAGNOSIS — K63.89 OTHER SPECIFIED DISEASES OF INTESTINE: ICD-10-CM

## 2023-04-07 PROBLEM — I10 ESSENTIAL (PRIMARY) HYPERTENSION: Chronic | Status: ACTIVE | Noted: 2023-04-04

## 2023-04-07 PROBLEM — Z00.00 ENCOUNTER FOR PREVENTIVE HEALTH EXAMINATION: Status: ACTIVE | Noted: 2023-04-07

## 2023-04-07 LAB
ANION GAP SERPL CALC-SCNC: 14 MMOL/L — SIGNIFICANT CHANGE UP (ref 7–14)
BUN SERPL-MCNC: 10 MG/DL — SIGNIFICANT CHANGE UP (ref 7–23)
CALCIUM SERPL-MCNC: 8.4 MG/DL — SIGNIFICANT CHANGE UP (ref 8.4–10.5)
CHLORIDE SERPL-SCNC: 103 MMOL/L — SIGNIFICANT CHANGE UP (ref 98–107)
CO2 SERPL-SCNC: 20 MMOL/L — LOW (ref 22–31)
CREAT SERPL-MCNC: 0.66 MG/DL — SIGNIFICANT CHANGE UP (ref 0.5–1.3)
EGFR: 93 ML/MIN/1.73M2 — SIGNIFICANT CHANGE UP
GLUCOSE SERPL-MCNC: 73 MG/DL — SIGNIFICANT CHANGE UP (ref 70–99)
HCT VFR BLD CALC: 32.7 % — LOW (ref 34.5–45)
HGB BLD-MCNC: 10.7 G/DL — LOW (ref 11.5–15.5)
MAGNESIUM SERPL-MCNC: 1.8 MG/DL — SIGNIFICANT CHANGE UP (ref 1.6–2.6)
MCHC RBC-ENTMCNC: 27.6 PG — SIGNIFICANT CHANGE UP (ref 27–34)
MCHC RBC-ENTMCNC: 32.7 GM/DL — SIGNIFICANT CHANGE UP (ref 32–36)
MCV RBC AUTO: 84.5 FL — SIGNIFICANT CHANGE UP (ref 80–100)
NRBC # BLD: 0 /100 WBCS — SIGNIFICANT CHANGE UP (ref 0–0)
NRBC # FLD: 0 K/UL — SIGNIFICANT CHANGE UP (ref 0–0)
PHOSPHATE SERPL-MCNC: 3.6 MG/DL — SIGNIFICANT CHANGE UP (ref 2.5–4.5)
PLATELET # BLD AUTO: 262 K/UL — SIGNIFICANT CHANGE UP (ref 150–400)
POTASSIUM SERPL-MCNC: 3.6 MMOL/L — SIGNIFICANT CHANGE UP (ref 3.5–5.3)
POTASSIUM SERPL-SCNC: 3.6 MMOL/L — SIGNIFICANT CHANGE UP (ref 3.5–5.3)
RBC # BLD: 3.87 M/UL — SIGNIFICANT CHANGE UP (ref 3.8–5.2)
RBC # FLD: 12.7 % — SIGNIFICANT CHANGE UP (ref 10.3–14.5)
SODIUM SERPL-SCNC: 137 MMOL/L — SIGNIFICANT CHANGE UP (ref 135–145)
WBC # BLD: 5.83 K/UL — SIGNIFICANT CHANGE UP (ref 3.8–10.5)
WBC # FLD AUTO: 5.83 K/UL — SIGNIFICANT CHANGE UP (ref 3.8–10.5)

## 2023-04-07 PROCEDURE — 99222 1ST HOSP IP/OBS MODERATE 55: CPT | Mod: GC

## 2023-04-07 PROCEDURE — 93306 TTE W/DOPPLER COMPLETE: CPT | Mod: 26

## 2023-04-07 RX ORDER — POTASSIUM CHLORIDE 20 MEQ
10 PACKET (EA) ORAL
Refills: 0 | Status: COMPLETED | OUTPATIENT
Start: 2023-04-07 | End: 2023-04-07

## 2023-04-07 RX ORDER — MAGNESIUM SULFATE 500 MG/ML
1 VIAL (ML) INJECTION ONCE
Refills: 0 | Status: COMPLETED | OUTPATIENT
Start: 2023-04-07 | End: 2023-04-07

## 2023-04-07 RX ADMIN — PANTOPRAZOLE SODIUM 40 MILLIGRAM(S): 20 TABLET, DELAYED RELEASE ORAL at 09:58

## 2023-04-07 RX ADMIN — BUDESONIDE AND FORMOTEROL FUMARATE DIHYDRATE 2 PUFF(S): 160; 4.5 AEROSOL RESPIRATORY (INHALATION) at 03:54

## 2023-04-07 RX ADMIN — Medication 100 MILLIEQUIVALENT(S): at 09:59

## 2023-04-07 RX ADMIN — Medication 100 GRAM(S): at 07:09

## 2023-04-07 RX ADMIN — Medication 100 MILLIEQUIVALENT(S): at 08:13

## 2023-04-07 RX ADMIN — Medication 100 MILLIEQUIVALENT(S): at 11:11

## 2023-04-07 RX ADMIN — ENOXAPARIN SODIUM 40 MILLIGRAM(S): 100 INJECTION SUBCUTANEOUS at 03:54

## 2023-04-07 NOTE — PROGRESS NOTE ADULT - ASSESSMENT
72F pmh HTN, COPD (worked at [employer], no hx tobacco use), open ccx, incisional hernia repair with mesh, presenting with abdominal discomfort of since 3/12. 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    Plan:  - NPO/IVF/NGT, consider clamp trial  - CT with PO and IV contrast did not show contrast in colon so pm abd xray obtained which did  - Need to obtain outpatient records, pathology of abdominal wall implant and colonoscopy, colonoscopy report, ECHO report, and any pulmonary testing  - cont home medications  - Medicine consult for optimization for potential surgery  - monitor vital signs, I&Os  - trend daily labs  - DVT ppx  - Dispo pending    A Team Surgery [id number]

## 2023-04-07 NOTE — CONSULT NOTE ADULT - ATTENDING COMMENTS
Genaro Keller is a 72-year-old woman with history of HTN, COPD (no hx tobacco use but was at [facility] on 9/11/2001), open ccx, cecal mass pending extraction, incisional hernia repair with mesh, presenting with abdominal discomfort of since 3/12/23. She is planned for cecal resection. Recent evaluations noted moderate inferior and mild inferolateral ischemia on nuclear stress test.  Revised cardiac risk index for pre-operative risk (RCRI) score is  1 point (Class II risk) with 6.0% 30-day risk of death, MI or cardiac arrest (Augustin 2017).  [place] Perioperative risk for myocardial infarction or cardiac arrest (MOSES) is 0.3% risk of MI or cardiac arrest intraoperatively or up to 30 days post op. Nevertheless, there is need for urgent hemicolectomy given severe SBO. PCI with intervention prior to surgery would be prohibitive given likely need for DAPT. Therefore, the current cardiovascular management plan will be to treat medically for CAD.  Start aspirin 81 mg daily, atorvastatin (Lipitor) 40 mg daily at bedtime, metoprolol tartrate 12.5 oral twice daily and isosorbide dinitrate 10 mg oral 3X daily (TID).

## 2023-04-07 NOTE — PROGRESS NOTE ADULT - SUBJECTIVE AND OBJECTIVE BOX
Team A Surgery Daily Progress Note    Subjective:   Patient seen at bedside this AM. Denies acute onset abdominal pain, N/V/D, fevers, chills, SOB, CP, lightheadedness. Patient states that she passed gas yesterday, no stools.     24h Events:   - Overnight, no acute events.    Objective:  Vital Signs  T(C): 37 (04-07 @ 02:00), Max: 37 (04-07 @ 02:00)  HR: 78 (04-07 @ 02:00) (73 - 83)  BP: 125/78 (04-07 @ 02:00) (125/76 - 136/59)  RR: 18 (04-07 @ 02:00) (18 - 18)  SpO2: 99% (04-07 @ 02:00) (98% - 99%)  04-06-23 @ 07:01  -  04-07-23 @ 07:00  --------------------------------------------------------  IN:  Total IN: 0 mL    OUT:    Nasogastric/Oral tube (mL): 1150 mL  Total OUT: 1150 mL    Total NET: -1150 mL      Physical Exam:  GEN: resting in bed comfortably in NAD  RESP: no increased WOB  ABD: soft, non-distended, non-tender  EXTR: warm, well-perfused without gross deformities; spontaneous movement in b/l U/L extrem      Labs:                        10.7   5.83  )-----------( 262      ( 07 Apr 2023 04:37 )             32.7   04-07    137  |  103  |  10  ----------------------------<  73  3.6   |  20<L>  |  0.66    Ca    8.4      07 Apr 2023 04:37  Phos  3.6     04-07  Mg     1.80     04-07      CAPILLARY BLOOD GLUCOSE      POCT Blood Glucose.: 76 mg/dL (06 Apr 2023 12:21)      Medications:   MEDICATIONS  (STANDING):  budesonide 160 MICROgram(s)/formoterol 4.5 MICROgram(s) Inhaler 2 Puff(s) Inhalation two times a day  enoxaparin Injectable 40 milliGRAM(s) SubCutaneous every 24 hours  lactated ringers 1000 milliLiter(s) (125 mL/Hr) IV Continuous <Continuous>  pantoprazole  Injectable 40 milliGRAM(s) IV Push every 24 hours  potassium chloride  10 mEq/100 mL IVPB 10 milliEquivalent(s) IV Intermittent every 1 hour  tiotropium 2.5 MICROgram(s) Inhaler 1 Puff(s) Inhalation daily    MEDICATIONS  (PRN):  albuterol    90 MICROgram(s) HFA Inhaler 2 Puff(s) Inhalation every 6 hours PRN Shortness of Breath and/or Wheezing      Imaging:

## 2023-04-07 NOTE — CONSULT NOTE ADULT - TIME BILLING
72-year-old woman with history of HTN, COPD (no hx tobacco use but was at [facility] on 9/11/2001), open ccx, cecal mass pending extraction, incisional hernia repair with mesh, presenting with abdominal discomfort of since 3/12/23. She is planned for cecal resection. Recent evaluations noted moderate inferior and mild inferolateral ischemia on nuclear stress test.  Revised cardiac risk index for pre-operative risk (RCRI) score is  1 point (Class II risk) with 6.0% 30-day risk of death, MI or cardiac arrest (Augustin 2017).  [place] Perioperative risk for myocardial infarction or cardiac arrest (MOSES) is 0.3% risk of MI or cardiac arrest intraoperatively or up to 30 days post op. Nevertheless, there is need for urgent hemicolectomy given severe SBO. PCI with intervention prior to surgery would be prohibitive given likely need for DAPT. Therefore, the current cardiovascular management plan will be to treat medically for CAD.

## 2023-04-07 NOTE — CONSULT NOTE ADULT - SUBJECTIVE AND OBJECTIVE BOX
CARDIOLOGY FELLOW CONSULT NOTE    Consulting service:  Reason for consult:    HPI:  72F pmh HTN, COPD (worked [place] [employer], no hx tobacco use), open ccx, cecal mass pending extraction, incisional hernia repair with mesh, presenting with abdominal discomfort of since 3/12.    Patient reports diarrhea progressing to blood in bm on 3/12. Also had difficulty eating with n/v. She went to [facility] where a CT showed a mass in the cecum. she underwent colonoscopy (dr. Danny Parisi) with bx which came back as malignant per patient. She also had an abdominal wall implant that was bx but does not know the results. She was discharged from that hospital two days prior to presentation at [facility] and wanted to pursue care at [facility] to be closer to her daughter. She reports no flatus for past two days with only liquid stool. Denies weight loss. No family history of colon cancer. (05 Apr 2023 01:05)    Pt currently admitted for SBO with high risk features pending surgical intervention for 4/12. Cardiology consulted for pre op risk stratification and optimization. Pt seen in med surg unit. Pt reports that she has no cardiac history that she is aware of and was undergoing full preoperative cardiac work up at [place] last month. Per primary team OSH records show an EF 55-60% with no WMA and mild TR. However NM Stress demonstrated moderate inducible inferior ischemia and mild inducible inferolateral ischemia, and pt has never been cathed. Currently pt says she is feeling well and had 2 small BMs. She has no hx of CVD CP or exertional sx. She is looking to establish care with a new cardiology group at this time.       PMH:   HTN (hypertension)        PSH:   No significant past surgical history        FAMILY HISTORY:      SH:      Allergies:  No Known Allergies      Home Meds:    Current Medications:   albuterol    90 MICROgram(s) HFA Inhaler 2 Puff(s) Inhalation every 6 hours PRN  budesonide 160 MICROgram(s)/formoterol 4.5 MICROgram(s) Inhaler 2 Puff(s) Inhalation two times a day  enoxaparin Injectable 40 milliGRAM(s) SubCutaneous every 24 hours  lactated ringers 1000 milliLiter(s) IV Continuous <Continuous>  pantoprazole  Injectable 40 milliGRAM(s) IV Push every 24 hours  tiotropium 2.5 MICROgram(s) Inhaler 1 Puff(s) Inhalation daily        REVIEW OF SYSTEMS:    All other review of systems is negative unless indicated above.    Physical Exam:  T(F): 97.2 (04-07), Max: 98.6 (04-07)  HR: 80 (04-07) (73 - 80)  BP: 141/91 (04-07) (125/78 - 141/91)  RR: 18 (04-07)  SpO2: 100% (04-07)    GENERAL: No acute distress, well-developed  HEAD:  Atraumatic, Normocephalic  ENT: EOMI, PERRLA, conjunctiva and sclera clear, Neck supple, No JVD, moist mucosa  CHEST/LUNG: Clear to auscultation bilaterally; No wheeze, equal breath sounds bilaterally   HEART: Regular rate and rhythm; No murmurs, rubs, or gallops  ABDOMEN: Mildly distended, NGT in place with bilious fluid   EXTREMITIES:  No clubbing, cyanosis, or edema  PSYCH: Nl behavior, nl affect  NEUROLOGY: AAOx3, non-focal, cranial nerves intact  SKIN: Normal color, No rashes or lesions  LINES:    Labs: Personally reviewed                        10.7   5.83  )-----------( 262      ( 07 Apr 2023 04:37 )             32.7     04-07    137  |  103  |  10  ----------------------------<  73  3.6   |  20<L>  |  0.66    Ca    8.4      07 Apr 2023 04:37  Phos  3.6     04-07  Mg     1.80     04-07      CARDIAC MARKERS ( 04 Apr 2023 20:51 )  14 ng/L / x     / x     / x     / x     / x      CARDIAC MARKERS ( 04 Apr 2023 18:27 )  19 ng/L / x     / x     / x     / x     / x          A/P  72F pmh HTN, COPD (worked at [employer], no hx tobacco use), open ccx, cecal mass pending extraction, incisional hernia repair with mesh, presenting with abdominal discomfort of since 3/12. Pt pending cecal resection, cardiology consulted for pre op risk stratification    #Pre Op Risk Stratification  #Positive Stress Test  - Pt with no known cardiac hx pending hemicolectomy for SBO and cecal mass   - OSH TTE WNL   - OSH NM Stress notable for moderate inferior ischemia and mild inferolateral ischemia  - Pt has no BL anginal sx or COREA  - RCRI 1   - Hartley 0.1%  - Case discussed with CRS team, Dr. Pratt and Dr. Epstein, Pt currently needs an urgent hemicolectomy iso severe SBO. Ideally she should be cathed and revascularized prior to her procedure however if she were to get a PCI pre op she would preclude herself from surgery due to DAPT (which could not be stopped under any circumstances). As such decision made to treat medically for CAD on the auspices of the positive stress test with plan to work up further once recovered from surgery  Plan:  - Start ASA 81 QDay and Lipitor 40 QHS  - Start Metoprolol tartrate 12.5 BID  - Start ISDN 10 TID   - F/U TTE  - No ischemic work up at this time   - Pt has no e/o ACS, ADHF, or life threatening arrhythmia that would preclude her from surgery. She is at mildly increased risk for a moderate risk surgery based on her positive stress however for the reasons mentioned above she can only be treated medically at this time.   - No further work up needed from a cardiac standpoint prior to intervention  - Please notify team once pt recovered from surgery to decide upon timing of ischemic evaluation and safety of DAPT from surgical perspective    Cardiology will follow peripherally    Signed by:  Jez Schwartz PGY4  Cardiology Fellow    VERENA Epstein

## 2023-04-07 NOTE — CONSULT NOTE ADULT - NS ATTEST RISK PROBLEM GEN_ALL_CORE FT
72-year-old woman with history of HTN, COPD (no hx tobacco use but was at [facility] on 9/11/2001), open ccx, cecal mass pending extraction, incisional hernia repair with mesh, presenting with abdominal discomfort of since 3/12/23. She is planned for cecal resection. Recent evaluations noted moderate inferior and mild inferolateral ischemia on nuclear stress test.  Revised cardiac risk index for pre-operative risk (RCRI) score is  1 point (Class II risk) with 6.0% 30-day risk of death, MI or cardiac arrest (Augustin 2017).  [place] Perioperative risk for myocardial infarction or cardiac arrest (MOSES) is 0.3% risk of MI or cardiac arrest intraoperatively or up to 30 days post op. Nevertheless, there is need for urgent hemicolectomy given severe SBO. PCI with intervention prior to surgery would be prohibitive given likely need for DAPT. Therefore, the current cardiovascular management plan will be to treat medically for CAD.

## 2023-04-08 LAB
ANION GAP SERPL CALC-SCNC: 17 MMOL/L — HIGH (ref 7–14)
BUN SERPL-MCNC: 6 MG/DL — LOW (ref 7–23)
CALCIUM SERPL-MCNC: 8.5 MG/DL — SIGNIFICANT CHANGE UP (ref 8.4–10.5)
CHLORIDE SERPL-SCNC: 100 MMOL/L — SIGNIFICANT CHANGE UP (ref 98–107)
CO2 SERPL-SCNC: 18 MMOL/L — LOW (ref 22–31)
CREAT SERPL-MCNC: 0.63 MG/DL — SIGNIFICANT CHANGE UP (ref 0.5–1.3)
EGFR: 94 ML/MIN/1.73M2 — SIGNIFICANT CHANGE UP
GLUCOSE BLDC GLUCOMTR-MCNC: 102 MG/DL — HIGH (ref 70–99)
GLUCOSE BLDC GLUCOMTR-MCNC: 106 MG/DL — HIGH (ref 70–99)
GLUCOSE BLDC GLUCOMTR-MCNC: 63 MG/DL — LOW (ref 70–99)
GLUCOSE BLDC GLUCOMTR-MCNC: 64 MG/DL — LOW (ref 70–99)
GLUCOSE BLDC GLUCOMTR-MCNC: 88 MG/DL — SIGNIFICANT CHANGE UP (ref 70–99)
GLUCOSE SERPL-MCNC: 65 MG/DL — LOW (ref 70–99)
HCT VFR BLD CALC: 34.7 % — SIGNIFICANT CHANGE UP (ref 34.5–45)
HGB BLD-MCNC: 11.5 G/DL — SIGNIFICANT CHANGE UP (ref 11.5–15.5)
MAGNESIUM SERPL-MCNC: 1.7 MG/DL — SIGNIFICANT CHANGE UP (ref 1.6–2.6)
MCHC RBC-ENTMCNC: 28 PG — SIGNIFICANT CHANGE UP (ref 27–34)
MCHC RBC-ENTMCNC: 33.1 GM/DL — SIGNIFICANT CHANGE UP (ref 32–36)
MCV RBC AUTO: 84.6 FL — SIGNIFICANT CHANGE UP (ref 80–100)
NRBC # BLD: 0 /100 WBCS — SIGNIFICANT CHANGE UP (ref 0–0)
NRBC # FLD: 0 K/UL — SIGNIFICANT CHANGE UP (ref 0–0)
PHOSPHATE SERPL-MCNC: 3.6 MG/DL — SIGNIFICANT CHANGE UP (ref 2.5–4.5)
PLATELET # BLD AUTO: 278 K/UL — SIGNIFICANT CHANGE UP (ref 150–400)
POTASSIUM SERPL-MCNC: 3.8 MMOL/L — SIGNIFICANT CHANGE UP (ref 3.5–5.3)
POTASSIUM SERPL-SCNC: 3.8 MMOL/L — SIGNIFICANT CHANGE UP (ref 3.5–5.3)
RBC # BLD: 4.1 M/UL — SIGNIFICANT CHANGE UP (ref 3.8–5.2)
RBC # FLD: 12.5 % — SIGNIFICANT CHANGE UP (ref 10.3–14.5)
SODIUM SERPL-SCNC: 135 MMOL/L — SIGNIFICANT CHANGE UP (ref 135–145)
WBC # BLD: 7.31 K/UL — SIGNIFICANT CHANGE UP (ref 3.8–10.5)
WBC # FLD AUTO: 7.31 K/UL — SIGNIFICANT CHANGE UP (ref 3.8–10.5)

## 2023-04-08 PROCEDURE — 99232 SBSQ HOSP IP/OBS MODERATE 35: CPT

## 2023-04-08 RX ORDER — METOPROLOL TARTRATE 50 MG
12.5 TABLET ORAL
Refills: 0 | Status: DISCONTINUED | OUTPATIENT
Start: 2023-04-08 | End: 2023-04-08

## 2023-04-08 RX ORDER — DEXTROSE 50 % IN WATER 50 %
10 SYRINGE (ML) INTRAVENOUS ONCE
Refills: 0 | Status: DISCONTINUED | OUTPATIENT
Start: 2023-04-08 | End: 2023-04-08

## 2023-04-08 RX ORDER — DEXTROSE 50 % IN WATER 50 %
25 SYRINGE (ML) INTRAVENOUS ONCE
Refills: 0 | Status: COMPLETED | OUTPATIENT
Start: 2023-04-08 | End: 2023-04-08

## 2023-04-08 RX ORDER — ISOSORBIDE DINITRATE 5 MG/1
10 TABLET ORAL THREE TIMES A DAY
Refills: 0 | Status: DISCONTINUED | OUTPATIENT
Start: 2023-04-08 | End: 2023-04-08

## 2023-04-08 RX ORDER — METOPROLOL TARTRATE 50 MG
2.5 TABLET ORAL EVERY 12 HOURS
Refills: 0 | Status: DISCONTINUED | OUTPATIENT
Start: 2023-04-08 | End: 2023-04-14

## 2023-04-08 RX ORDER — DEXTROSE MONOHYDRATE, SODIUM CHLORIDE, AND POTASSIUM CHLORIDE 50; .745; 4.5 G/1000ML; G/1000ML; G/1000ML
1000 INJECTION, SOLUTION INTRAVENOUS
Refills: 0 | Status: DISCONTINUED | OUTPATIENT
Start: 2023-04-08 | End: 2023-04-12

## 2023-04-08 RX ORDER — ATORVASTATIN CALCIUM 80 MG/1
40 TABLET, FILM COATED ORAL AT BEDTIME
Refills: 0 | Status: DISCONTINUED | OUTPATIENT
Start: 2023-04-08 | End: 2023-04-15

## 2023-04-08 RX ORDER — METOPROLOL TARTRATE 50 MG
2.5 TABLET ORAL EVERY 12 HOURS
Refills: 0 | Status: DISCONTINUED | OUTPATIENT
Start: 2023-04-08 | End: 2023-04-08

## 2023-04-08 RX ORDER — ISOSORBIDE DINITRATE 5 MG/1
10 TABLET ORAL THREE TIMES A DAY
Refills: 0 | Status: DISCONTINUED | OUTPATIENT
Start: 2023-04-08 | End: 2023-04-15

## 2023-04-08 RX ORDER — SODIUM CHLORIDE 9 MG/ML
600 INJECTION, SOLUTION INTRAVENOUS ONCE
Refills: 0 | Status: COMPLETED | OUTPATIENT
Start: 2023-04-08 | End: 2023-04-08

## 2023-04-08 RX ORDER — ASPIRIN/CALCIUM CARB/MAGNESIUM 324 MG
81 TABLET ORAL DAILY
Refills: 0 | Status: DISCONTINUED | OUTPATIENT
Start: 2023-04-08 | End: 2023-04-15

## 2023-04-08 RX ORDER — ONDANSETRON 8 MG/1
4 TABLET, FILM COATED ORAL ONCE
Refills: 0 | Status: COMPLETED | OUTPATIENT
Start: 2023-04-08 | End: 2023-04-08

## 2023-04-08 RX ORDER — ACETAMINOPHEN 500 MG
1000 TABLET ORAL ONCE
Refills: 0 | Status: COMPLETED | OUTPATIENT
Start: 2023-04-08 | End: 2023-04-08

## 2023-04-08 RX ORDER — ACETAMINOPHEN 500 MG
975 TABLET ORAL EVERY 6 HOURS
Refills: 0 | Status: DISCONTINUED | OUTPATIENT
Start: 2023-04-08 | End: 2023-04-08

## 2023-04-08 RX ADMIN — SODIUM CHLORIDE 125 MILLILITER(S): 9 INJECTION, SOLUTION INTRAVENOUS at 05:38

## 2023-04-08 RX ADMIN — ISOSORBIDE DINITRATE 10 MILLIGRAM(S): 5 TABLET ORAL at 17:43

## 2023-04-08 RX ADMIN — SODIUM CHLORIDE 600 MILLILITER(S): 9 INJECTION, SOLUTION INTRAVENOUS at 09:33

## 2023-04-08 RX ADMIN — Medication 400 MILLIGRAM(S): at 18:03

## 2023-04-08 RX ADMIN — ONDANSETRON 4 MILLIGRAM(S): 8 TABLET, FILM COATED ORAL at 17:43

## 2023-04-08 RX ADMIN — Medication 25 MILLILITER(S): at 11:58

## 2023-04-08 RX ADMIN — Medication 81 MILLIGRAM(S): at 11:17

## 2023-04-08 RX ADMIN — DEXTROSE MONOHYDRATE, SODIUM CHLORIDE, AND POTASSIUM CHLORIDE 125 MILLILITER(S): 50; .745; 4.5 INJECTION, SOLUTION INTRAVENOUS at 13:51

## 2023-04-08 RX ADMIN — ISOSORBIDE DINITRATE 10 MILLIGRAM(S): 5 TABLET ORAL at 11:17

## 2023-04-08 RX ADMIN — Medication 1000 MILLIGRAM(S): at 18:55

## 2023-04-08 RX ADMIN — Medication 2.5 MILLIGRAM(S): at 18:03

## 2023-04-08 RX ADMIN — PANTOPRAZOLE SODIUM 40 MILLIGRAM(S): 20 TABLET, DELAYED RELEASE ORAL at 09:38

## 2023-04-08 RX ADMIN — ENOXAPARIN SODIUM 40 MILLIGRAM(S): 100 INJECTION SUBCUTANEOUS at 05:39

## 2023-04-08 NOTE — PROGRESS NOTE ADULT - ASSESSMENT
72F pmh HTN, COPD (worked [place] [employer], no hx tobacco use), open ccx, incisional hernia repair with mesh, presenting with abdominal discomfort of since 3/12. 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    Plan:  - 0.5 to 1 NGT output repleations Qshift  - NPO/IVF.  - Outpatient records, pathology of abdominal wall implant and colonoscopy, colonoscopy report, ECHO report, and any pulmonary testing copies on chart  - cont home medications  - Medicine consult for optimization for potential surgery  - Cardiology recs for optimization.   - trend daily labs  - DVT ppx  - Dispo pending    A Team Surgery [id number]

## 2023-04-08 NOTE — PROVIDER CONTACT NOTE (MEDICATION) - SITUATION
Patient NPO on low wall suction NGT.  Patient due for PO aspirin and isosorbide.  Patient due for medications to be given in NGT pending provider order.  Medications placed out of reach of patient on window sill.

## 2023-04-08 NOTE — PROGRESS NOTE ADULT - SUBJECTIVE AND OBJECTIVE BOX
Surgery Progress Note    INTERVAL EVENTS:   No acute events overnight.    SUBJECTIVE: Patient seen and examined at bedside with surgical team, denies pain, no nausea or vomiting. Has not been oob, ngt in place.     OBJECTIVE:    Vital Signs Last 24 Hrs  T(C): 36.7 (08 Apr 2023 10:31), Max: 37 (08 Apr 2023 02:15)  T(F): 98 (08 Apr 2023 10:31), Max: 98.6 (08 Apr 2023 02:15)  HR: 81 (08 Apr 2023 10:31) (71 - 82)  BP: 150/81 (08 Apr 2023 10:31) (133/66 - 158/86)  BP(mean): --  RR: 18 (08 Apr 2023 10:31) (17 - 18)  SpO2: 99% (08 Apr 2023 10:31) (97% - 99%)    Parameters below as of 08 Apr 2023 10:31  Patient On (Oxygen Delivery Method): room air    I&O's Detail    07 Apr 2023 07:01  -  08 Apr 2023 07:00  --------------------------------------------------------  IN:    Lactated Ringers w/ Additives: 875 mL  Total IN: 875 mL    OUT:    Nasogastric/Oral tube (mL): 1350 mL    Voided (mL): 450 mL  Total OUT: 1800 mL    Total NET: -925 mL      08 Apr 2023 07:01  -  08 Apr 2023 13:39  --------------------------------------------------------  IN:  Total IN: 0 mL    OUT:    Nasogastric/Oral tube (mL): 100 mL  Total OUT: 100 mL    Total NET: -100 mL      MEDICATIONS  (STANDING):  aspirin  chewable 81 milliGRAM(s) Oral daily  atorvastatin 40 milliGRAM(s) Oral at bedtime  budesonide 160 MICROgram(s)/formoterol 4.5 MICROgram(s) Inhaler 2 Puff(s) Inhalation two times a day  dextrose 5% + sodium chloride 0.45% with potassium chloride 20 mEq/L 1000 milliLiter(s) (125 mL/Hr) IV Continuous <Continuous>  enoxaparin Injectable 40 milliGRAM(s) SubCutaneous every 24 hours  isosorbide   dinitrate Tablet (ISORDIL) 10 milliGRAM(s) Oral three times a day  metoprolol tartrate Injectable 2.5 milliGRAM(s) IV Push every 12 hours  pantoprazole  Injectable 40 milliGRAM(s) IV Push every 24 hours  tiotropium 2.5 MICROgram(s) Inhaler 1 Puff(s) Inhalation daily    MEDICATIONS  (PRN):  albuterol    90 MICROgram(s) HFA Inhaler 2 Puff(s) Inhalation every 6 hours PRN Shortness of Breath and/or Wheezing      PHYSICAL EXAM:  Constitutional: NAD, ngt in place.   Respiratory: Unlabored breathing  Abdomen: Soft, nondistended, NTTP. No rebound or guarding.  Extremities: WWP, AMADOR spontaneously    LABS:                        11.5   7.31  )-----------( 278      ( 08 Apr 2023 11:28 )             34.7     04-08    135  |  100  |  6<L>  ----------------------------<  65<L>  3.8   |  18<L>  |  0.63    Ca    8.5      08 Apr 2023 11:28  Phos  3.6     04-08  Mg     1.70     04-08                IMAGING:     Date of service: 4/8/23      Surgery Progress Note    INTERVAL EVENTS:   No acute events overnight.    SUBJECTIVE: Patient seen and examined at bedside with surgical team, denies pain, no nausea or vomiting. Has not been oob, ngt in place.     OBJECTIVE:    Vital Signs Last 24 Hrs  T(C): 36.7 (08 Apr 2023 10:31), Max: 37 (08 Apr 2023 02:15)  T(F): 98 (08 Apr 2023 10:31), Max: 98.6 (08 Apr 2023 02:15)  HR: 81 (08 Apr 2023 10:31) (71 - 82)  BP: 150/81 (08 Apr 2023 10:31) (133/66 - 158/86)  BP(mean): --  RR: 18 (08 Apr 2023 10:31) (17 - 18)  SpO2: 99% (08 Apr 2023 10:31) (97% - 99%)    Parameters below as of 08 Apr 2023 10:31  Patient On (Oxygen Delivery Method): room air    I&O's Detail    07 Apr 2023 07:01  -  08 Apr 2023 07:00  --------------------------------------------------------  IN:    Lactated Ringers w/ Additives: 875 mL  Total IN: 875 mL    OUT:    Nasogastric/Oral tube (mL): 1350 mL    Voided (mL): 450 mL  Total OUT: 1800 mL    Total NET: -925 mL      08 Apr 2023 07:01  -  08 Apr 2023 13:39  --------------------------------------------------------  IN:  Total IN: 0 mL    OUT:    Nasogastric/Oral tube (mL): 100 mL  Total OUT: 100 mL    Total NET: -100 mL      MEDICATIONS  (STANDING):  aspirin  chewable 81 milliGRAM(s) Oral daily  atorvastatin 40 milliGRAM(s) Oral at bedtime  budesonide 160 MICROgram(s)/formoterol 4.5 MICROgram(s) Inhaler 2 Puff(s) Inhalation two times a day  dextrose 5% + sodium chloride 0.45% with potassium chloride 20 mEq/L 1000 milliLiter(s) (125 mL/Hr) IV Continuous <Continuous>  enoxaparin Injectable 40 milliGRAM(s) SubCutaneous every 24 hours  isosorbide   dinitrate Tablet (ISORDIL) 10 milliGRAM(s) Oral three times a day  metoprolol tartrate Injectable 2.5 milliGRAM(s) IV Push every 12 hours  pantoprazole  Injectable 40 milliGRAM(s) IV Push every 24 hours  tiotropium 2.5 MICROgram(s) Inhaler 1 Puff(s) Inhalation daily    MEDICATIONS  (PRN):  albuterol    90 MICROgram(s) HFA Inhaler 2 Puff(s) Inhalation every 6 hours PRN Shortness of Breath and/or Wheezing      PHYSICAL EXAM:  Constitutional: NAD, ngt in place.   Respiratory: Unlabored breathing  Abdomen: Soft, nondistended, NTTP. No rebound or guarding.  Extremities: WWP, AMADOR spontaneously    LABS:                        11.5   7.31  )-----------( 278      ( 08 Apr 2023 11:28 )             34.7     04-08    135  |  100  |  6<L>  ----------------------------<  65<L>  3.8   |  18<L>  |  0.63    Ca    8.5      08 Apr 2023 11:28  Phos  3.6     04-08  Mg     1.70     04-08                IMAGING:

## 2023-04-08 NOTE — PROVIDER CONTACT NOTE (HYPOGLYCEMIA EVENT) - NS PROVIDER CONTACT BACKGROUND-HYPO
Age: 72y    Gender: Female    POCT Blood Glucose:  88 mg/dL (04-08-23 @ 17:41)  102 mg/dL (04-08-23 @ 12:39)  63 mg/dL (04-08-23 @ 11:22)  64 mg/dL (04-08-23 @ 08:52)      eMAR:  dextrose 50% Injectable   25 milliLiter(s) IV Push (04-08-23 @ 11:58)    Patient NPO with NGT to low wall suction in place.  Patient hypolgycemic with 63 BG FS asymptomatic. Patient BG  with recheck following D50IVP .

## 2023-04-09 LAB
ANION GAP SERPL CALC-SCNC: 13 MMOL/L — SIGNIFICANT CHANGE UP (ref 7–14)
BUN SERPL-MCNC: 4 MG/DL — LOW (ref 7–23)
CALCIUM SERPL-MCNC: 8.6 MG/DL — SIGNIFICANT CHANGE UP (ref 8.4–10.5)
CHLORIDE SERPL-SCNC: 104 MMOL/L — SIGNIFICANT CHANGE UP (ref 98–107)
CO2 SERPL-SCNC: 20 MMOL/L — LOW (ref 22–31)
CREAT SERPL-MCNC: 0.62 MG/DL — SIGNIFICANT CHANGE UP (ref 0.5–1.3)
EGFR: 95 ML/MIN/1.73M2 — SIGNIFICANT CHANGE UP
GLUCOSE BLDC GLUCOMTR-MCNC: 111 MG/DL — HIGH (ref 70–99)
GLUCOSE BLDC GLUCOMTR-MCNC: 115 MG/DL — HIGH (ref 70–99)
GLUCOSE BLDC GLUCOMTR-MCNC: 119 MG/DL — HIGH (ref 70–99)
GLUCOSE BLDC GLUCOMTR-MCNC: 121 MG/DL — HIGH (ref 70–99)
GLUCOSE BLDC GLUCOMTR-MCNC: 129 MG/DL — HIGH (ref 70–99)
GLUCOSE SERPL-MCNC: 113 MG/DL — HIGH (ref 70–99)
HCT VFR BLD CALC: 32.4 % — LOW (ref 34.5–45)
HGB BLD-MCNC: 10.9 G/DL — LOW (ref 11.5–15.5)
MAGNESIUM SERPL-MCNC: 1.5 MG/DL — LOW (ref 1.6–2.6)
MCHC RBC-ENTMCNC: 27.4 PG — SIGNIFICANT CHANGE UP (ref 27–34)
MCHC RBC-ENTMCNC: 33.6 GM/DL — SIGNIFICANT CHANGE UP (ref 32–36)
MCV RBC AUTO: 81.4 FL — SIGNIFICANT CHANGE UP (ref 80–100)
NRBC # BLD: 0 /100 WBCS — SIGNIFICANT CHANGE UP (ref 0–0)
NRBC # FLD: 0 K/UL — SIGNIFICANT CHANGE UP (ref 0–0)
PHOSPHATE SERPL-MCNC: 3.1 MG/DL — SIGNIFICANT CHANGE UP (ref 2.5–4.5)
PLATELET # BLD AUTO: 292 K/UL — SIGNIFICANT CHANGE UP (ref 150–400)
POTASSIUM SERPL-MCNC: 3.4 MMOL/L — LOW (ref 3.5–5.3)
POTASSIUM SERPL-SCNC: 3.4 MMOL/L — LOW (ref 3.5–5.3)
RBC # BLD: 3.98 M/UL — SIGNIFICANT CHANGE UP (ref 3.8–5.2)
RBC # FLD: 12.3 % — SIGNIFICANT CHANGE UP (ref 10.3–14.5)
SODIUM SERPL-SCNC: 137 MMOL/L — SIGNIFICANT CHANGE UP (ref 135–145)
WBC # BLD: 6.87 K/UL — SIGNIFICANT CHANGE UP (ref 3.8–10.5)
WBC # FLD AUTO: 6.87 K/UL — SIGNIFICANT CHANGE UP (ref 3.8–10.5)

## 2023-04-09 PROCEDURE — 99231 SBSQ HOSP IP/OBS SF/LOW 25: CPT

## 2023-04-09 RX ORDER — ACETAMINOPHEN 500 MG
1000 TABLET ORAL ONCE
Refills: 0 | Status: COMPLETED | OUTPATIENT
Start: 2023-04-09 | End: 2023-04-09

## 2023-04-09 RX ORDER — MAGNESIUM SULFATE 500 MG/ML
2 VIAL (ML) INJECTION ONCE
Refills: 0 | Status: COMPLETED | OUTPATIENT
Start: 2023-04-09 | End: 2023-04-09

## 2023-04-09 RX ORDER — POTASSIUM CHLORIDE 20 MEQ
10 PACKET (EA) ORAL
Refills: 0 | Status: COMPLETED | OUTPATIENT
Start: 2023-04-09 | End: 2023-04-09

## 2023-04-09 RX ORDER — SODIUM CHLORIDE 9 MG/ML
350 INJECTION INTRAMUSCULAR; INTRAVENOUS; SUBCUTANEOUS ONCE
Refills: 0 | Status: COMPLETED | OUTPATIENT
Start: 2023-04-09 | End: 2023-04-09

## 2023-04-09 RX ADMIN — PANTOPRAZOLE SODIUM 40 MILLIGRAM(S): 20 TABLET, DELAYED RELEASE ORAL at 10:43

## 2023-04-09 RX ADMIN — Medication 100 MILLIEQUIVALENT(S): at 14:26

## 2023-04-09 RX ADMIN — Medication 25 GRAM(S): at 17:16

## 2023-04-09 RX ADMIN — Medication 400 MILLIGRAM(S): at 04:42

## 2023-04-09 RX ADMIN — Medication 1000 MILLIGRAM(S): at 05:12

## 2023-04-09 RX ADMIN — Medication 2.5 MILLIGRAM(S): at 19:13

## 2023-04-09 RX ADMIN — SODIUM CHLORIDE 350 MILLILITER(S): 9 INJECTION INTRAMUSCULAR; INTRAVENOUS; SUBCUTANEOUS at 07:28

## 2023-04-09 RX ADMIN — Medication 400 MILLIGRAM(S): at 15:54

## 2023-04-09 RX ADMIN — ENOXAPARIN SODIUM 40 MILLIGRAM(S): 100 INJECTION SUBCUTANEOUS at 05:59

## 2023-04-09 RX ADMIN — Medication 25 GRAM(S): at 19:14

## 2023-04-09 RX ADMIN — Medication 2.5 MILLIGRAM(S): at 05:59

## 2023-04-09 RX ADMIN — Medication 100 MILLIEQUIVALENT(S): at 12:40

## 2023-04-09 RX ADMIN — Medication 100 MILLIEQUIVALENT(S): at 15:54

## 2023-04-09 RX ADMIN — DEXTROSE MONOHYDRATE, SODIUM CHLORIDE, AND POTASSIUM CHLORIDE 125 MILLILITER(S): 50; .745; 4.5 INJECTION, SOLUTION INTRAVENOUS at 05:58

## 2023-04-09 NOTE — PROGRESS NOTE ADULT - SUBJECTIVE AND OBJECTIVE BOX
Surgery Progress Note    INTERVAL EVENTS:   No acute events overnight.    SUBJECTIVE: Patient seen and examined at bedside with surgical team, no complains.     OBJECTIVE:    Vital Signs Last 24 Hrs  T(C): 36.7 (09 Apr 2023 09:49), Max: 36.9 (09 Apr 2023 01:17)  T(F): 98 (09 Apr 2023 09:49), Max: 98.4 (09 Apr 2023 01:17)  HR: 79 (09 Apr 2023 09:49) (78 - 87)  BP: 135/69 (09 Apr 2023 09:49) (126/65 - 143/78)  BP(mean): --  RR: 18 (09 Apr 2023 09:49) (16 - 18)  SpO2: 100% (09 Apr 2023 09:49) (99% - 100%)    Parameters below as of 09 Apr 2023 09:49  Patient On (Oxygen Delivery Method): room air    I&O's Detail    08 Apr 2023 07:01  -  09 Apr 2023 07:00  --------------------------------------------------------  IN:    dextrose 5% + sodium chloride 0.45% w/ Additives: 1375 mL  Total IN: 1375 mL    OUT:    Nasogastric/Oral tube (mL): 1050 mL    Oral Fluid: 0 mL  Total OUT: 1050 mL    Total NET: 325 mL      MEDICATIONS  (STANDING):  aspirin  chewable 81 milliGRAM(s) Oral daily  atorvastatin 40 milliGRAM(s) Oral at bedtime  budesonide 160 MICROgram(s)/formoterol 4.5 MICROgram(s) Inhaler 2 Puff(s) Inhalation two times a day  dextrose 5% + sodium chloride 0.45% with potassium chloride 20 mEq/L 1000 milliLiter(s) (125 mL/Hr) IV Continuous <Continuous>  enoxaparin Injectable 40 milliGRAM(s) SubCutaneous every 24 hours  isosorbide   dinitrate Tablet (ISORDIL) 10 milliGRAM(s) Oral three times a day  magnesium sulfate  IVPB 2 Gram(s) IV Intermittent once  magnesium sulfate  IVPB 2 Gram(s) IV Intermittent once  metoprolol tartrate Injectable 2.5 milliGRAM(s) IV Push every 12 hours  pantoprazole  Injectable 40 milliGRAM(s) IV Push every 24 hours  potassium chloride  10 mEq/100 mL IVPB 10 milliEquivalent(s) IV Intermittent every 1 hour  tiotropium 2.5 MICROgram(s) Inhaler 1 Puff(s) Inhalation daily    MEDICATIONS  (PRN):  albuterol    90 MICROgram(s) HFA Inhaler 2 Puff(s) Inhalation every 6 hours PRN Shortness of Breath and/or Wheezing      PHYSICAL EXAM:  Constitutional: NAD. NGT in place.   Respiratory: Unlabored breathing  Abdomen: Soft, nondistended, NTTP. No rebound or guarding.  Extremities: WWP, AMADOR spontaneously    LABS:                        10.9   6.87  )-----------( 292      ( 09 Apr 2023 05:04 )             32.4     04-09    137  |  104  |  4<L>  ----------------------------<  113<H>  3.4<L>   |  20<L>  |  0.62    Ca    8.6      09 Apr 2023 05:04  Phos  3.1     04-09  Mg     1.50     04-09                IMAGING:

## 2023-04-09 NOTE — PROGRESS NOTE ADULT - ASSESSMENT
72F pmh HTN, COPD (worked at [employer], no hx tobacco use), open ccx, incisional hernia repair with mesh, presenting with abdominal discomfort of since 3/12. 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    Plan:  - 0.5 to 1 NGT output repleations Qshift  - NPO/IVF.  - Outpatient records, pathology of abdominal wall implant and colonoscopy, colonoscopy report, ECHO report, and any pulmonary testing copies on chart  - cont home medications  - Medicine consult for optimization for potential surgery  - Cardiology recs for optimization.   - trend daily labs  - DVT ppx  - Dispo pending    A Team Surgery [id number]

## 2023-04-10 LAB
ANION GAP SERPL CALC-SCNC: 13 MMOL/L — SIGNIFICANT CHANGE UP (ref 7–14)
ANION GAP SERPL CALC-SCNC: 4 MMOL/L — LOW (ref 7–14)
BUN SERPL-MCNC: 4 MG/DL — LOW (ref 7–23)
BUN SERPL-MCNC: 5 MG/DL — LOW (ref 7–23)
CALCIUM SERPL-MCNC: 8.5 MG/DL — SIGNIFICANT CHANGE UP (ref 8.4–10.5)
CALCIUM SERPL-MCNC: 8.6 MG/DL — SIGNIFICANT CHANGE UP (ref 8.4–10.5)
CHLORIDE SERPL-SCNC: 103 MMOL/L — SIGNIFICANT CHANGE UP (ref 98–107)
CHLORIDE SERPL-SCNC: 99 MMOL/L — SIGNIFICANT CHANGE UP (ref 98–107)
CO2 SERPL-SCNC: 19 MMOL/L — LOW (ref 22–31)
CO2 SERPL-SCNC: 28 MMOL/L — SIGNIFICANT CHANGE UP (ref 22–31)
CREAT SERPL-MCNC: 0.55 MG/DL — SIGNIFICANT CHANGE UP (ref 0.5–1.3)
CREAT SERPL-MCNC: 0.59 MG/DL — SIGNIFICANT CHANGE UP (ref 0.5–1.3)
EGFR: 96 ML/MIN/1.73M2 — SIGNIFICANT CHANGE UP
EGFR: 97 ML/MIN/1.73M2 — SIGNIFICANT CHANGE UP
GLUCOSE BLDC GLUCOMTR-MCNC: 115 MG/DL — HIGH (ref 70–99)
GLUCOSE BLDC GLUCOMTR-MCNC: 119 MG/DL — HIGH (ref 70–99)
GLUCOSE BLDC GLUCOMTR-MCNC: 125 MG/DL — HIGH (ref 70–99)
GLUCOSE BLDC GLUCOMTR-MCNC: 137 MG/DL — HIGH (ref 70–99)
GLUCOSE SERPL-MCNC: 145 MG/DL — HIGH (ref 70–99)
GLUCOSE SERPL-MCNC: 145 MG/DL — HIGH (ref 70–99)
HCT VFR BLD CALC: 35.2 % — SIGNIFICANT CHANGE UP (ref 34.5–45)
HCT VFR BLD CALC: 36 % — SIGNIFICANT CHANGE UP (ref 34.5–45)
HGB BLD-MCNC: 11.8 G/DL — SIGNIFICANT CHANGE UP (ref 11.5–15.5)
HGB BLD-MCNC: 12 G/DL — SIGNIFICANT CHANGE UP (ref 11.5–15.5)
MAGNESIUM SERPL-MCNC: 2.3 MG/DL — SIGNIFICANT CHANGE UP (ref 1.6–2.6)
MAGNESIUM SERPL-MCNC: 3 MG/DL — HIGH (ref 1.6–2.6)
MCHC RBC-ENTMCNC: 27.5 PG — SIGNIFICANT CHANGE UP (ref 27–34)
MCHC RBC-ENTMCNC: 28 PG — SIGNIFICANT CHANGE UP (ref 27–34)
MCHC RBC-ENTMCNC: 33.3 GM/DL — SIGNIFICANT CHANGE UP (ref 32–36)
MCHC RBC-ENTMCNC: 33.5 GM/DL — SIGNIFICANT CHANGE UP (ref 32–36)
MCV RBC AUTO: 82.4 FL — SIGNIFICANT CHANGE UP (ref 80–100)
MCV RBC AUTO: 83.4 FL — SIGNIFICANT CHANGE UP (ref 80–100)
NRBC # BLD: 0 /100 WBCS — SIGNIFICANT CHANGE UP (ref 0–0)
NRBC # BLD: 0 /100 WBCS — SIGNIFICANT CHANGE UP (ref 0–0)
NRBC # FLD: 0 K/UL — SIGNIFICANT CHANGE UP (ref 0–0)
NRBC # FLD: 0 K/UL — SIGNIFICANT CHANGE UP (ref 0–0)
PHOSPHATE SERPL-MCNC: 3 MG/DL — SIGNIFICANT CHANGE UP (ref 2.5–4.5)
PHOSPHATE SERPL-MCNC: 3.3 MG/DL — SIGNIFICANT CHANGE UP (ref 2.5–4.5)
PLATELET # BLD AUTO: 308 K/UL — SIGNIFICANT CHANGE UP (ref 150–400)
PLATELET # BLD AUTO: 309 K/UL — SIGNIFICANT CHANGE UP (ref 150–400)
POTASSIUM SERPL-MCNC: 4.4 MMOL/L — SIGNIFICANT CHANGE UP (ref 3.5–5.3)
POTASSIUM SERPL-MCNC: 6 MMOL/L — HIGH (ref 3.5–5.3)
POTASSIUM SERPL-SCNC: 4.4 MMOL/L — SIGNIFICANT CHANGE UP (ref 3.5–5.3)
POTASSIUM SERPL-SCNC: 6 MMOL/L — HIGH (ref 3.5–5.3)
RBC # BLD: 4.22 M/UL — SIGNIFICANT CHANGE UP (ref 3.8–5.2)
RBC # BLD: 4.37 M/UL — SIGNIFICANT CHANGE UP (ref 3.8–5.2)
RBC # FLD: 12.8 % — SIGNIFICANT CHANGE UP (ref 10.3–14.5)
RBC # FLD: 12.8 % — SIGNIFICANT CHANGE UP (ref 10.3–14.5)
SODIUM SERPL-SCNC: 131 MMOL/L — LOW (ref 135–145)
SODIUM SERPL-SCNC: 135 MMOL/L — SIGNIFICANT CHANGE UP (ref 135–145)
WBC # BLD: 5.83 K/UL — SIGNIFICANT CHANGE UP (ref 3.8–10.5)
WBC # BLD: 6.27 K/UL — SIGNIFICANT CHANGE UP (ref 3.8–10.5)
WBC # FLD AUTO: 5.83 K/UL — SIGNIFICANT CHANGE UP (ref 3.8–10.5)
WBC # FLD AUTO: 6.27 K/UL — SIGNIFICANT CHANGE UP (ref 3.8–10.5)

## 2023-04-10 PROCEDURE — 71045 X-RAY EXAM CHEST 1 VIEW: CPT | Mod: 26

## 2023-04-10 RX ORDER — ACETAMINOPHEN 500 MG
1000 TABLET ORAL ONCE
Refills: 0 | Status: COMPLETED | OUTPATIENT
Start: 2023-04-10 | End: 2023-04-10

## 2023-04-10 RX ORDER — ONDANSETRON 8 MG/1
4 TABLET, FILM COATED ORAL ONCE
Refills: 0 | Status: DISCONTINUED | OUTPATIENT
Start: 2023-04-10 | End: 2023-04-13

## 2023-04-10 RX ORDER — SODIUM CHLORIDE 9 MG/ML
325 INJECTION INTRAMUSCULAR; INTRAVENOUS; SUBCUTANEOUS ONCE
Refills: 0 | Status: COMPLETED | OUTPATIENT
Start: 2023-04-10 | End: 2023-04-10

## 2023-04-10 RX ORDER — ONDANSETRON 8 MG/1
4 TABLET, FILM COATED ORAL ONCE
Refills: 0 | Status: COMPLETED | OUTPATIENT
Start: 2023-04-10 | End: 2023-04-10

## 2023-04-10 RX ADMIN — ISOSORBIDE DINITRATE 10 MILLIGRAM(S): 5 TABLET ORAL at 16:02

## 2023-04-10 RX ADMIN — DEXTROSE MONOHYDRATE, SODIUM CHLORIDE, AND POTASSIUM CHLORIDE 125 MILLILITER(S): 50; .745; 4.5 INJECTION, SOLUTION INTRAVENOUS at 23:04

## 2023-04-10 RX ADMIN — Medication 2.5 MILLIGRAM(S): at 05:29

## 2023-04-10 RX ADMIN — Medication 400 MILLIGRAM(S): at 02:06

## 2023-04-10 RX ADMIN — ISOSORBIDE DINITRATE 10 MILLIGRAM(S): 5 TABLET ORAL at 11:56

## 2023-04-10 RX ADMIN — Medication 2.5 MILLIGRAM(S): at 18:40

## 2023-04-10 RX ADMIN — ATORVASTATIN CALCIUM 40 MILLIGRAM(S): 80 TABLET, FILM COATED ORAL at 21:50

## 2023-04-10 RX ADMIN — Medication 400 MILLIGRAM(S): at 21:46

## 2023-04-10 RX ADMIN — SODIUM CHLORIDE 650 MILLILITER(S): 9 INJECTION INTRAMUSCULAR; INTRAVENOUS; SUBCUTANEOUS at 09:43

## 2023-04-10 RX ADMIN — ENOXAPARIN SODIUM 40 MILLIGRAM(S): 100 INJECTION SUBCUTANEOUS at 05:28

## 2023-04-10 RX ADMIN — DEXTROSE MONOHYDRATE, SODIUM CHLORIDE, AND POTASSIUM CHLORIDE 125 MILLILITER(S): 50; .745; 4.5 INJECTION, SOLUTION INTRAVENOUS at 05:32

## 2023-04-10 RX ADMIN — Medication 1000 MILLIGRAM(S): at 22:45

## 2023-04-10 RX ADMIN — Medication 81 MILLIGRAM(S): at 11:56

## 2023-04-10 RX ADMIN — DEXTROSE MONOHYDRATE, SODIUM CHLORIDE, AND POTASSIUM CHLORIDE 125 MILLILITER(S): 50; .745; 4.5 INJECTION, SOLUTION INTRAVENOUS at 09:43

## 2023-04-10 RX ADMIN — ONDANSETRON 4 MILLIGRAM(S): 8 TABLET, FILM COATED ORAL at 01:51

## 2023-04-10 RX ADMIN — PANTOPRAZOLE SODIUM 40 MILLIGRAM(S): 20 TABLET, DELAYED RELEASE ORAL at 09:43

## 2023-04-10 RX ADMIN — Medication 1000 MILLIGRAM(S): at 02:36

## 2023-04-10 NOTE — PROGRESS NOTE ADULT - SUBJECTIVE AND OBJECTIVE BOX
TEAM A Surgery Daily Progress Note  =====================================================    SUBJECTIVE: Patient seen and examined at bedside on AM rounds. Has no complaints this AM. Understands plans for OR on Wednesday.    --------------------------------------------------------------------------------------  OBJECTIVE:    VITAL SIGNS:  Vital Signs Last 24 Hrs  T(C): 36.7 (10 Apr 2023 05:20), Max: 36.7 (09 Apr 2023 09:49)  T(F): 98.1 (10 Apr 2023 05:20), Max: 98.1 (10 Apr 2023 05:20)  HR: 73 (10 Apr 2023 05:20) (67 - 79)  BP: 133/72 (10 Apr 2023 05:20) (121/60 - 137/82)  BP(mean): --  RR: 18 (10 Apr 2023 05:20) (16 - 18)  SpO2: 99% (10 Apr 2023 05:20) (98% - 100%)    Parameters below as of 10 Apr 2023 05:20  Patient On (Oxygen Delivery Method): room air      --------------------------------------------------------------------------------------    EXAM:  General: NAD, resting in bed comfortably.  Cardiac: regular rate, warm and well perfused  Respiratory: Nonlabored respirations, normal cw expansion.  Abdomen: soft, nontender, nondistended.    --------------------------------------------------------------------------------------    LABS:                        11.8   6.27  )-----------( 308      ( 10 Apr 2023 06:40 )             35.2     04-10    131<L>  |  99  |  5<L>  ----------------------------<  145<H>  6.0<H>   |  28  |  0.55    Ca    8.6      10 Apr 2023 06:40  Phos  3.3     04-10  Mg     3.00     04-10      --------------------------------------------------------------------------------------    INS AND OUTS:    04-09-23 @ 07:01  -  04-10-23 @ 07:00  --------------------------------------------------------  IN: 0 mL / OUT: 1950 mL / NET: -1950 mL        --------------------------------------------------------------------------------------    MEDICATIONS:  MEDICATIONS  (STANDING):  aspirin  chewable 81 milliGRAM(s) Oral daily  atorvastatin 40 milliGRAM(s) Oral at bedtime  budesonide 160 MICROgram(s)/formoterol 4.5 MICROgram(s) Inhaler 2 Puff(s) Inhalation two times a day  dextrose 5% + sodium chloride 0.45% with potassium chloride 20 mEq/L 1000 milliLiter(s) (125 mL/Hr) IV Continuous <Continuous>  enoxaparin Injectable 40 milliGRAM(s) SubCutaneous every 24 hours  isosorbide   dinitrate Tablet (ISORDIL) 10 milliGRAM(s) Oral three times a day  metoprolol tartrate Injectable 2.5 milliGRAM(s) IV Push every 12 hours  pantoprazole  Injectable 40 milliGRAM(s) IV Push every 24 hours  tiotropium 2.5 MICROgram(s) Inhaler 1 Puff(s) Inhalation daily    MEDICATIONS  (PRN):  albuterol    90 MICROgram(s) HFA Inhaler 2 Puff(s) Inhalation every 6 hours PRN Shortness of Breath and/or Wheezing    --------------------------------------------------------------------------------------

## 2023-04-10 NOTE — PROGRESS NOTE ADULT - ASSESSMENT
72F pmh HTN, COPD (worked at [employer], no hx tobacco use), open ccx, incisional hernia repair with mesh, presenting with abdominal discomfort of since 3/12. 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    Plan:  - Plan for OR on Wedensday  - Will repeat CXR to confirm NG placement  - NPO/IVF/NGT  - 0.5 to 1 NGT output repleations Qshift  - Outpatient records, pathology of abdominal wall implant and colonoscopy, colonoscopy report, ECHO report, and any pulmonary testing copies in chart  - cont home medications  - Medicine consult for optimization for potential surgery  - Cardiology recs for optimization.   - DVT ppx    A Team Surgery   [id number]

## 2023-04-11 ENCOUNTER — TRANSCRIPTION ENCOUNTER (OUTPATIENT)
Age: 73
End: 2023-04-11

## 2023-04-11 LAB
ANION GAP SERPL CALC-SCNC: 10 MMOL/L — SIGNIFICANT CHANGE UP (ref 7–14)
APTT BLD: 33.1 SEC — SIGNIFICANT CHANGE UP (ref 27–36.3)
BLD GP AB SCN SERPL QL: NEGATIVE — SIGNIFICANT CHANGE UP
BUN SERPL-MCNC: 5 MG/DL — LOW (ref 7–23)
CALCIUM SERPL-MCNC: 8.5 MG/DL — SIGNIFICANT CHANGE UP (ref 8.4–10.5)
CHLORIDE SERPL-SCNC: 106 MMOL/L — SIGNIFICANT CHANGE UP (ref 98–107)
CO2 SERPL-SCNC: 22 MMOL/L — SIGNIFICANT CHANGE UP (ref 22–31)
CREAT SERPL-MCNC: 0.7 MG/DL — SIGNIFICANT CHANGE UP (ref 0.5–1.3)
EGFR: 92 ML/MIN/1.73M2 — SIGNIFICANT CHANGE UP
GLUCOSE BLDC GLUCOMTR-MCNC: 103 MG/DL — HIGH (ref 70–99)
GLUCOSE BLDC GLUCOMTR-MCNC: 108 MG/DL — HIGH (ref 70–99)
GLUCOSE BLDC GLUCOMTR-MCNC: 118 MG/DL — HIGH (ref 70–99)
GLUCOSE BLDC GLUCOMTR-MCNC: 123 MG/DL — HIGH (ref 70–99)
GLUCOSE BLDC GLUCOMTR-MCNC: 127 MG/DL — HIGH (ref 70–99)
GLUCOSE SERPL-MCNC: 128 MG/DL — HIGH (ref 70–99)
HCT VFR BLD CALC: 35.2 % — SIGNIFICANT CHANGE UP (ref 34.5–45)
HGB BLD-MCNC: 11.6 G/DL — SIGNIFICANT CHANGE UP (ref 11.5–15.5)
INR BLD: 1.25 RATIO — HIGH (ref 0.88–1.16)
MAGNESIUM SERPL-MCNC: 2 MG/DL — SIGNIFICANT CHANGE UP (ref 1.6–2.6)
MCHC RBC-ENTMCNC: 28 PG — SIGNIFICANT CHANGE UP (ref 27–34)
MCHC RBC-ENTMCNC: 33 GM/DL — SIGNIFICANT CHANGE UP (ref 32–36)
MCV RBC AUTO: 84.8 FL — SIGNIFICANT CHANGE UP (ref 80–100)
NRBC # BLD: 0 /100 WBCS — SIGNIFICANT CHANGE UP (ref 0–0)
NRBC # FLD: 0 K/UL — SIGNIFICANT CHANGE UP (ref 0–0)
PHOSPHATE SERPL-MCNC: 3.7 MG/DL — SIGNIFICANT CHANGE UP (ref 2.5–4.5)
PLATELET # BLD AUTO: 316 K/UL — SIGNIFICANT CHANGE UP (ref 150–400)
POTASSIUM SERPL-MCNC: 3.8 MMOL/L — SIGNIFICANT CHANGE UP (ref 3.5–5.3)
POTASSIUM SERPL-SCNC: 3.8 MMOL/L — SIGNIFICANT CHANGE UP (ref 3.5–5.3)
PROTHROM AB SERPL-ACNC: 14.5 SEC — HIGH (ref 10.5–13.4)
RBC # BLD: 4.15 M/UL — SIGNIFICANT CHANGE UP (ref 3.8–5.2)
RBC # FLD: 13.2 % — SIGNIFICANT CHANGE UP (ref 10.3–14.5)
RH IG SCN BLD-IMP: POSITIVE — SIGNIFICANT CHANGE UP
SARS-COV-2 RNA SPEC QL NAA+PROBE: SIGNIFICANT CHANGE UP
SODIUM SERPL-SCNC: 138 MMOL/L — SIGNIFICANT CHANGE UP (ref 135–145)
WBC # BLD: 5.52 K/UL — SIGNIFICANT CHANGE UP (ref 3.8–10.5)
WBC # FLD AUTO: 5.52 K/UL — SIGNIFICANT CHANGE UP (ref 3.8–10.5)

## 2023-04-11 PROCEDURE — 99231 SBSQ HOSP IP/OBS SF/LOW 25: CPT | Mod: 57

## 2023-04-11 RX ORDER — ACETAMINOPHEN 500 MG
1000 TABLET ORAL ONCE
Refills: 0 | Status: COMPLETED | OUTPATIENT
Start: 2023-04-11 | End: 2023-04-11

## 2023-04-11 RX ORDER — POTASSIUM CHLORIDE 20 MEQ
10 PACKET (EA) ORAL
Refills: 0 | Status: COMPLETED | OUTPATIENT
Start: 2023-04-11 | End: 2023-04-11

## 2023-04-11 RX ADMIN — Medication 1000 MILLIGRAM(S): at 23:30

## 2023-04-11 RX ADMIN — ISOSORBIDE DINITRATE 10 MILLIGRAM(S): 5 TABLET ORAL at 19:08

## 2023-04-11 RX ADMIN — Medication 100 MILLIEQUIVALENT(S): at 11:44

## 2023-04-11 RX ADMIN — Medication 100 MILLIEQUIVALENT(S): at 13:10

## 2023-04-11 RX ADMIN — PANTOPRAZOLE SODIUM 40 MILLIGRAM(S): 20 TABLET, DELAYED RELEASE ORAL at 11:48

## 2023-04-11 RX ADMIN — Medication 2.5 MILLIGRAM(S): at 05:44

## 2023-04-11 RX ADMIN — Medication 81 MILLIGRAM(S): at 11:48

## 2023-04-11 RX ADMIN — ATORVASTATIN CALCIUM 40 MILLIGRAM(S): 80 TABLET, FILM COATED ORAL at 22:08

## 2023-04-11 RX ADMIN — ISOSORBIDE DINITRATE 10 MILLIGRAM(S): 5 TABLET ORAL at 11:47

## 2023-04-11 RX ADMIN — ENOXAPARIN SODIUM 40 MILLIGRAM(S): 100 INJECTION SUBCUTANEOUS at 05:43

## 2023-04-11 RX ADMIN — Medication 400 MILLIGRAM(S): at 22:38

## 2023-04-11 RX ADMIN — DEXTROSE MONOHYDRATE, SODIUM CHLORIDE, AND POTASSIUM CHLORIDE 125 MILLILITER(S): 50; .745; 4.5 INJECTION, SOLUTION INTRAVENOUS at 17:48

## 2023-04-11 RX ADMIN — ISOSORBIDE DINITRATE 10 MILLIGRAM(S): 5 TABLET ORAL at 05:43

## 2023-04-11 RX ADMIN — DEXTROSE MONOHYDRATE, SODIUM CHLORIDE, AND POTASSIUM CHLORIDE 125 MILLILITER(S): 50; .745; 4.5 INJECTION, SOLUTION INTRAVENOUS at 07:29

## 2023-04-11 RX ADMIN — Medication 2.5 MILLIGRAM(S): at 19:06

## 2023-04-11 NOTE — DIETITIAN NUTRITION RISK NOTIFICATION - MALNUTRITION EVALUATION AS DEMONSTRATED BY (ADULTS > 20 YEARS OF AGE)
Weight loss.../Inadequate energy intake.../Loss of muscle...
Weight loss.../Inadequate energy intake...

## 2023-04-11 NOTE — CHART NOTE - NSCHARTNOTEFT_GEN_A_CORE
Reason for Follow-Up Assessment: Follow-Up Moderate Malnutrition     PMH:  72F pmh HTN, COPD (worked at [employer], no hx tobacco use), open ccx, incisional hernia repair with mesh, presenting with abdominal discomfort of since 3/12. 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      Patient noted with plans for OR Wednesday, remains NPO with IVF/NGT for gastric decompression.  Given patient has been NPO x7 days (since 4/5/23) and with weight loss of 4.2% in <1 week,, now meets criteria for severe malnutrition.    Source: [ ] Patient [ ] Family [ ] RN [ X ] Chart     Diet, NPO (04-10-23 @ 10:38)    GI: WDL. Last BM noted on [ 4/7 ]    Enteral /Parenteral Nutrition:       [ X ] n/a    Weight Hx: 4/9 - 96.3kg      4/5 - 100.5kg  Wt loss 4.2kg / 4.2% weight loss <1 week likely in setting of NPO status, pending surgery for SBO.    Edema: No edema per nursing flowsheets.     Pressure Injuries: No pressure ulcers/DTI noted in flowsheets.     _______________ Pertinent Medications_______________  MEDICATIONS  (STANDING):  aspirin  chewable 81 milliGRAM(s) Oral daily  atorvastatin 40 milliGRAM(s) Oral at bedtime  budesonide 160 MICROgram(s)/formoterol 4.5 MICROgram(s) Inhaler 2 Puff(s) Inhalation two times a day  dextrose 5% + sodium chloride 0.45% with potassium chloride 20 mEq/L 1000 milliLiter(s) (125 mL/Hr) IV Continuous <Continuous>  enoxaparin Injectable 40 milliGRAM(s) SubCutaneous every 24 hours  isosorbide   dinitrate Tablet (ISORDIL) 10 milliGRAM(s) Oral three times a day  metoprolol tartrate Injectable 2.5 milliGRAM(s) IV Push every 12 hours  ondansetron Injectable 4 milliGRAM(s) IV Push once  pantoprazole  Injectable 40 milliGRAM(s) IV Push every 24 hours  potassium chloride  10 mEq/100 mL IVPB 10 milliEquivalent(s) IV Intermittent every 1 hour  tiotropium 2.5 MICROgram(s) Inhaler 1 Puff(s) Inhalation daily    MEDICATIONS  (PRN):  albuterol    90 MICROgram(s) HFA Inhaler 2 Puff(s) Inhalation every 6 hours PRN Shortness of Breath and/or Wheezing    __________________ Pertinent Labs__________________   04-11 Na138 mmol/L Glu 128 mg/dL<H> K+ 3.8 mmol/L Cr  0.70 mg/dL BUN 5 mg/dL<L> 04-11 Phos 3.7 mg/dL 04-04 Alb 4.1 g/dL    POCT Blood Glucose.: 118 mg/dL (04-11-23 @ 07:17)  POCT Blood Glucose.: 123 mg/dL (04-11-23 @ 02:00)  POCT Blood Glucose.: 115 mg/dL (04-10-23 @ 18:15)  POCT Blood Glucose.: 119 mg/dL (04-10-23 @ 14:50)  POCT Blood Glucose.: 137 mg/dL (04-10-23 @ 08:45)  POCT Blood Glucose.: 125 mg/dL (04-10-23 @ 06:05)  POCT Blood Glucose.: 115 mg/dL (04-09-23 @ 23:39)  POCT Blood Glucose.: 121 mg/dL (04-09-23 @ 17:42)  POCT Blood Glucose.: 129 mg/dL (04-09-23 @ 12:24)  POCT Blood Glucose.: 119 mg/dL (04-09-23 @ 08:56)  POCT Blood Glucose.: 111 mg/dL (04-09-23 @ 06:25)  POCT Blood Glucose.: 106 mg/dL (04-08-23 @ 21:59)  POCT Blood Glucose.: 88 mg/dL (04-08-23 @ 17:41)  POCT Blood Glucose.: 102 mg/dL (04-08-23 @ 12:39)  POCT Blood Glucose.: 63 mg/dL (04-08-23 @ 11:22)    Estimated Needs:   [X] no change since previous assessment  [ ] recalculated:     Previous Nutrition Diagnosis: Moderate Malnutrition    Nutrition Diagnosis is [ ] ongoing  [ ] resolved [ X ] not applicable     New Nutrition Diagnosis: Severe Malnutrition in setting of acute illness as evidenced by weight loss 4.2% <1 week, caloric intake <50% nutrition needs >5 days.    Recommendations:  1) Consider alternate means of nutrition given prolonged NPO status, suggest consulting nutrition support team if PO diet remains contraindicated following surgery.  2) Obtain and record current weights to best monitor for acute changes in nutritional status.    Monica Deras, MS, RDN, CDN  Pager [phone]  Also available on MS Teams Reason for Follow-Up Assessment: Follow-Up Moderate Malnutrition     PMH:  72F pmh HTN, COPD (worked at [employer], no hx tobacco use), open ccx, incisional hernia repair with mesh, presenting with abdominal discomfort of since 3/12. 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      Patient noted with plans for OR Wednesday, remains NPO with IVF/NGT for gastric decompression.  Given patient has been NPO x7 days (since 4/5/23) and with weight loss of 4.2% in <1 week,, now meets criteria for severe malnutrition. Patient verbalized that she is looking forward to being able to eat following surgery.      Source: [ X ] Patient [ ] Family [ ] RN [ X ] Chart     Diet, NPO (04-10-23 @ 10:38)    GI: WDL. Last BM noted on [ 4/7 ]    Enteral /Parenteral Nutrition:       [ X ] n/a    Weight Hx: 4/9 - 96.3kg      4/5 - 100.5kg  Wt loss 4.2kg / 4.2% weight loss <1 week likely in setting of NPO status, pending surgery for SBO.    Edema: No edema per nursing flowsheets.     Pressure Injuries: No pressure ulcers/DTI noted in flowsheets.     _______________ Pertinent Medications_______________  MEDICATIONS  (STANDING):  aspirin  chewable 81 milliGRAM(s) Oral daily  atorvastatin 40 milliGRAM(s) Oral at bedtime  budesonide 160 MICROgram(s)/formoterol 4.5 MICROgram(s) Inhaler 2 Puff(s) Inhalation two times a day  dextrose 5% + sodium chloride 0.45% with potassium chloride 20 mEq/L 1000 milliLiter(s) (125 mL/Hr) IV Continuous <Continuous>  enoxaparin Injectable 40 milliGRAM(s) SubCutaneous every 24 hours  isosorbide   dinitrate Tablet (ISORDIL) 10 milliGRAM(s) Oral three times a day  metoprolol tartrate Injectable 2.5 milliGRAM(s) IV Push every 12 hours  ondansetron Injectable 4 milliGRAM(s) IV Push once  pantoprazole  Injectable 40 milliGRAM(s) IV Push every 24 hours  potassium chloride  10 mEq/100 mL IVPB 10 milliEquivalent(s) IV Intermittent every 1 hour  tiotropium 2.5 MICROgram(s) Inhaler 1 Puff(s) Inhalation daily    MEDICATIONS  (PRN):  albuterol    90 MICROgram(s) HFA Inhaler 2 Puff(s) Inhalation every 6 hours PRN Shortness of Breath and/or Wheezing    __________________ Pertinent Labs__________________   04-11 Na138 mmol/L Glu 128 mg/dL<H> K+ 3.8 mmol/L Cr  0.70 mg/dL BUN 5 mg/dL<L> 04-11 Phos 3.7 mg/dL 04-04 Alb 4.1 g/dL    POCT Blood Glucose.: 118 mg/dL (04-11-23 @ 07:17)  POCT Blood Glucose.: 123 mg/dL (04-11-23 @ 02:00)  POCT Blood Glucose.: 115 mg/dL (04-10-23 @ 18:15)  POCT Blood Glucose.: 119 mg/dL (04-10-23 @ 14:50)  POCT Blood Glucose.: 137 mg/dL (04-10-23 @ 08:45)  POCT Blood Glucose.: 125 mg/dL (04-10-23 @ 06:05)  POCT Blood Glucose.: 115 mg/dL (04-09-23 @ 23:39)  POCT Blood Glucose.: 121 mg/dL (04-09-23 @ 17:42)  POCT Blood Glucose.: 129 mg/dL (04-09-23 @ 12:24)  POCT Blood Glucose.: 119 mg/dL (04-09-23 @ 08:56)  POCT Blood Glucose.: 111 mg/dL (04-09-23 @ 06:25)  POCT Blood Glucose.: 106 mg/dL (04-08-23 @ 21:59)  POCT Blood Glucose.: 88 mg/dL (04-08-23 @ 17:41)  POCT Blood Glucose.: 102 mg/dL (04-08-23 @ 12:39)  POCT Blood Glucose.: 63 mg/dL (04-08-23 @ 11:22)    Estimated Needs:   [X] no change since previous assessment  [ ] recalculated:     Previous Nutrition Diagnosis: Moderate Malnutrition    Nutrition Diagnosis is [ ] ongoing  [ ] resolved [ X ] not applicable     New Nutrition Diagnosis: Severe Malnutrition in setting of acute illness as evidenced by weight loss 4.2% <1 week, caloric intake <50% nutrition needs >5 days.    Recommendations:  1) May consider alternate means of nutrition given prolonged NPO status, suggest consulting nutrition support team if PO diet remains contraindicated following upcoming surgery. Otherwise, advance diet as tolerated following surgery in accordance with colorectal surgery team recommendations. RDN to follow-up accordingly and make suggestions for oral supplementation if indicated and appropriate, patient amenable to receiving Ensure if needed.  2) Obtain and record current weights to best monitor for acute changes in nutritional status.    Monica Deras, MS, RDN, CDN  Pager [phone]  Also available on MS Teams

## 2023-04-11 NOTE — ADVANCED PRACTICE NURSE CONSULT - ASSESSMENT
Abdomen assessed in lying, sitting and standing position. Stoma julia made in LLQ & RLQ, below umbilicus, impinging on the midline, avoiding creases/folds, within the rectus muscle, and made in LUQ & RUQ, above umbilicus, impinging on the midline, avoiding creases/folds, within the rectus muscle.. Explained stoma creation, and introduced pouching system to patient.  Patient able to understand use of pouching system and frequency of pouching system changes/emptying pouching system. Other questions answered about lifestyle after surgery with pouching system. Will continue to monitor patient after surgery for post-operative ostomy teaching.

## 2023-04-11 NOTE — PROGRESS NOTE ADULT - SUBJECTIVE AND OBJECTIVE BOX
A TEAM SURGERY DAILY PROGRESS NOTE    S: Patient seen and examined at bedside. Feels well. Ready for surgery.     O: Vital Signs Last 24 Hrs  T(C): 36.4 (11 Apr 2023 05:45), Max: 36.9 (10 Apr 2023 16:00)  T(F): 97.6 (11 Apr 2023 05:45), Max: 98.5 (10 Apr 2023 16:00)  HR: 66 (11 Apr 2023 05:45) (66 - 91)  BP: 137/88 (11 Apr 2023 05:45) (126/76 - 155/89)  BP(mean): --  RR: 18 (11 Apr 2023 05:45) (16 - 18)  SpO2: 99% (11 Apr 2023 05:45) (96% - 100%)    Parameters below as of 11 Apr 2023 05:45  Patient On (Oxygen Delivery Method): room air        I&O's Detail    10 Apr 2023 07:01  -  11 Apr 2023 07:00  --------------------------------------------------------  IN:  Total IN: 0 mL    OUT:    Nasogastric/Oral tube (mL): 750 mL  Total OUT: 750 mL    Total NET: -750 mL      EXAM  General: alert and oriented, NAD  Resp: airway patent, respirations unlabored  CVS: regular rate and rhythm  Abdomen: soft, nontender, nondistended  Extremities: no edema  Skin: warm, dry, appropriate color      LABS                      11.6   5.52  )-----------( 316      ( 11 Apr 2023 07:00 )             35.2   04-11    138  |  106  |  5<L>  ----------------------------<  128<H>  3.8   |  22  |  0.70    Ca    8.5      11 Apr 2023 07:00  Phos  3.7     04-11  Mg     2.00     04-11

## 2023-04-11 NOTE — PROGRESS NOTE ADULT - ATTENDING COMMENTS
Pt w/ abnormality noted on OSH stress test and cardiology consulted but given obstruction she will need surgery and won't be on dual antiplatelet therapy so will hold on stenting  She reports that she has continued to have some flatus and stool  Continue NG output    abd soft, obese, non-distended, non-TTP  well healed midline incision  NG tube to LIS    - continue NPO and IVFs w/ NG tube  - hold DVT ppx for surgery tomorrow  - plan for laparoscopic, possible right colectomy, possible ostomy  - WOCN consult for stoma marking    Wendy Salguero MD  Attending Physician

## 2023-04-11 NOTE — ADVANCED PRACTICE NURSE CONSULT - REASON FOR CONSULT
Patient seen during rounds for stoma marking for colostomy creation, plan for OR Wednesday, 4/12/23. Patient is a 72F pmh HTN, COPD (worked at [employer], no hx tobacco use), open ccx, incisional hernia repair with mesh, presenting with abdominal discomfort of since 3/12. 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 Patient reports diarrhea progressing to blood in bm on 3/12. Also had difficulty eating with n/v. She went to [facility] where a CT showed a mass in the cecum. she underwent colonoscopy (dr. Danny Parisi) with bx which came back as malignant per patient. She also had an abdominal wall implant that was bx but does not know the results. She was discharged from that hospital two days prior to presentation at [facility] and wanted to pursue care at [facility] to be closer to her daughter. She reports no flatus for past two days with only liquid stool. Denies weight loss. No family history of colon cancer.

## 2023-04-11 NOTE — PROGRESS NOTE ADULT - ASSESSMENT
72F pmh HTN, COPD (worked at [employer], no hx tobacco use), open ccx, incisional hernia repair with mesh, presenting with abdominal discomfort of since 3/12. 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    Plan:  - Plan for OR on Wedensday  - NPO/IVF/NGT  - 0.5 to 1 NGT output repleations Qshift  - Outpatient records, pathology of abdominal wall implant and colonoscopy, colonoscopy report, ECHO report, and any pulmonary testing copies in chart  - cont home medications  - Medicine consult for optimization for surgery  - Cardiology recs for optimization.   - DVT ppx    A Team Surgery   [id number]

## 2023-04-11 NOTE — ADVANCED PRACTICE NURSE CONSULT - RECOMMEDATIONS
Ostomy team will continue to follow.     Please contact Wound/ostomy Care Service Line if we can be of further assistance (ext [phone]).

## 2023-04-12 ENCOUNTER — RESULT REVIEW (OUTPATIENT)
Age: 73
End: 2023-04-12

## 2023-04-12 ENCOUNTER — APPOINTMENT (OUTPATIENT)
Dept: SURGERY | Facility: HOSPITAL | Age: 73
End: 2023-04-12
Payer: MEDICARE

## 2023-04-12 ENCOUNTER — TRANSCRIPTION ENCOUNTER (OUTPATIENT)
Age: 73
End: 2023-04-12

## 2023-04-12 LAB
ANION GAP SERPL CALC-SCNC: 8 MMOL/L — SIGNIFICANT CHANGE UP (ref 7–14)
APTT BLD: 29.2 SEC — SIGNIFICANT CHANGE UP (ref 27–36.3)
BLD GP AB SCN SERPL QL: NEGATIVE — SIGNIFICANT CHANGE UP
BUN SERPL-MCNC: 3 MG/DL — LOW (ref 7–23)
CALCIUM SERPL-MCNC: 8.1 MG/DL — LOW (ref 8.4–10.5)
CHLORIDE SERPL-SCNC: 106 MMOL/L — SIGNIFICANT CHANGE UP (ref 98–107)
CO2 SERPL-SCNC: 21 MMOL/L — LOW (ref 22–31)
CREAT SERPL-MCNC: 0.62 MG/DL — SIGNIFICANT CHANGE UP (ref 0.5–1.3)
EGFR: 95 ML/MIN/1.73M2 — SIGNIFICANT CHANGE UP
GLUCOSE BLDC GLUCOMTR-MCNC: 109 MG/DL — HIGH (ref 70–99)
GLUCOSE BLDC GLUCOMTR-MCNC: 117 MG/DL — HIGH (ref 70–99)
GLUCOSE BLDC GLUCOMTR-MCNC: 166 MG/DL — HIGH (ref 70–99)
GLUCOSE BLDC GLUCOMTR-MCNC: 171 MG/DL — HIGH (ref 70–99)
GLUCOSE SERPL-MCNC: 320 MG/DL — HIGH (ref 70–99)
HCT VFR BLD CALC: 33 % — LOW (ref 34.5–45)
HGB BLD-MCNC: 10.7 G/DL — LOW (ref 11.5–15.5)
INR BLD: 1.33 RATIO — HIGH (ref 0.88–1.16)
MAGNESIUM SERPL-MCNC: 1.8 MG/DL — SIGNIFICANT CHANGE UP (ref 1.6–2.6)
MCHC RBC-ENTMCNC: 27.4 PG — SIGNIFICANT CHANGE UP (ref 27–34)
MCHC RBC-ENTMCNC: 32.4 GM/DL — SIGNIFICANT CHANGE UP (ref 32–36)
MCV RBC AUTO: 84.4 FL — SIGNIFICANT CHANGE UP (ref 80–100)
NRBC # BLD: 0 /100 WBCS — SIGNIFICANT CHANGE UP (ref 0–0)
NRBC # FLD: 0 K/UL — SIGNIFICANT CHANGE UP (ref 0–0)
PHOSPHATE SERPL-MCNC: 3.8 MG/DL — SIGNIFICANT CHANGE UP (ref 2.5–4.5)
PLATELET # BLD AUTO: 319 K/UL — SIGNIFICANT CHANGE UP (ref 150–400)
POTASSIUM SERPL-MCNC: 4.8 MMOL/L — SIGNIFICANT CHANGE UP (ref 3.5–5.3)
POTASSIUM SERPL-SCNC: 4.8 MMOL/L — SIGNIFICANT CHANGE UP (ref 3.5–5.3)
PROTHROM AB SERPL-ACNC: 15.5 SEC — HIGH (ref 10.5–13.4)
RBC # BLD: 3.91 M/UL — SIGNIFICANT CHANGE UP (ref 3.8–5.2)
RBC # FLD: 13.2 % — SIGNIFICANT CHANGE UP (ref 10.3–14.5)
RH IG SCN BLD-IMP: POSITIVE — SIGNIFICANT CHANGE UP
SODIUM SERPL-SCNC: 135 MMOL/L — SIGNIFICANT CHANGE UP (ref 135–145)
WBC # BLD: 5.52 K/UL — SIGNIFICANT CHANGE UP (ref 3.8–10.5)
WBC # FLD AUTO: 5.52 K/UL — SIGNIFICANT CHANGE UP (ref 3.8–10.5)

## 2023-04-12 PROCEDURE — 88341 IMHCHEM/IMCYTCHM EA ADD ANTB: CPT | Mod: 26,59

## 2023-04-12 PROCEDURE — 44140 PARTIAL REMOVAL OF COLON: CPT

## 2023-04-12 PROCEDURE — 88342 IMHCHEM/IMCYTCHM 1ST ANTB: CPT | Mod: 26

## 2023-04-12 PROCEDURE — 88309 TISSUE EXAM BY PATHOLOGIST: CPT | Mod: 26

## 2023-04-12 DEVICE — LIGATING CLIPS WECK HORIZON MEDIUM (BLUE) 24: Type: IMPLANTABLE DEVICE | Site: RIGHT | Status: FUNCTIONAL

## 2023-04-12 DEVICE — STAPLER COVIDIEN TA 90 BLUE: Type: IMPLANTABLE DEVICE | Site: RIGHT | Status: FUNCTIONAL

## 2023-04-12 DEVICE — STAPLER COVIDIEN ENDO GIA 80-3.8MM BLUE: Type: IMPLANTABLE DEVICE | Site: RIGHT | Status: FUNCTIONAL

## 2023-04-12 DEVICE — LIGATING CLIPS WECK HORIZON LARGE (ORANGE) 24: Type: IMPLANTABLE DEVICE | Site: RIGHT | Status: FUNCTIONAL

## 2023-04-12 RX ORDER — NALOXONE HYDROCHLORIDE 4 MG/.1ML
0.1 SPRAY NASAL
Refills: 0 | Status: DISCONTINUED | OUTPATIENT
Start: 2023-04-12 | End: 2023-04-15

## 2023-04-12 RX ORDER — MORPHINE SULFATE 50 MG/1
0.5 CAPSULE, EXTENDED RELEASE ORAL EVERY 6 HOURS
Refills: 0 | Status: DISCONTINUED | OUTPATIENT
Start: 2023-04-12 | End: 2023-04-13

## 2023-04-12 RX ORDER — ACETAMINOPHEN 500 MG
1000 TABLET ORAL EVERY 6 HOURS
Refills: 0 | Status: COMPLETED | OUTPATIENT
Start: 2023-04-12 | End: 2023-04-13

## 2023-04-12 RX ORDER — NALBUPHINE HYDROCHLORIDE 10 MG/ML
2.5 INJECTION, SOLUTION INTRAMUSCULAR; INTRAVENOUS; SUBCUTANEOUS EVERY 6 HOURS
Refills: 0 | Status: DISCONTINUED | OUTPATIENT
Start: 2023-04-12 | End: 2023-04-14

## 2023-04-12 RX ORDER — OXYCODONE HYDROCHLORIDE 5 MG/1
5 TABLET ORAL
Refills: 0 | Status: DISCONTINUED | OUTPATIENT
Start: 2023-04-12 | End: 2023-04-13

## 2023-04-12 RX ORDER — OXYCODONE HYDROCHLORIDE 5 MG/1
10 TABLET ORAL
Refills: 0 | Status: DISCONTINUED | OUTPATIENT
Start: 2023-04-12 | End: 2023-04-13

## 2023-04-12 RX ORDER — OXYCODONE HYDROCHLORIDE 5 MG/1
5 TABLET ORAL EVERY 4 HOURS
Refills: 0 | Status: DISCONTINUED | OUTPATIENT
Start: 2023-04-12 | End: 2023-04-15

## 2023-04-12 RX ORDER — OXYCODONE HYDROCHLORIDE 5 MG/1
2.5 TABLET ORAL EVERY 4 HOURS
Refills: 0 | Status: DISCONTINUED | OUTPATIENT
Start: 2023-04-12 | End: 2023-04-15

## 2023-04-12 RX ORDER — SODIUM CHLORIDE 9 MG/ML
1000 INJECTION, SOLUTION INTRAVENOUS
Refills: 0 | Status: DISCONTINUED | OUTPATIENT
Start: 2023-04-12 | End: 2023-04-14

## 2023-04-12 RX ORDER — ONDANSETRON 8 MG/1
4 TABLET, FILM COATED ORAL EVERY 6 HOURS
Refills: 0 | Status: DISCONTINUED | OUTPATIENT
Start: 2023-04-12 | End: 2023-04-15

## 2023-04-12 RX ORDER — BUTORPHANOL TARTRATE 2 MG/ML
0.12 INJECTION, SOLUTION INTRAMUSCULAR; INTRAVENOUS EVERY 6 HOURS
Refills: 0 | Status: DISCONTINUED | OUTPATIENT
Start: 2023-04-12 | End: 2023-04-14

## 2023-04-12 RX ORDER — HYDROMORPHONE HYDROCHLORIDE 2 MG/ML
1 INJECTION INTRAMUSCULAR; INTRAVENOUS; SUBCUTANEOUS
Refills: 0 | Status: DISCONTINUED | OUTPATIENT
Start: 2023-04-12 | End: 2023-04-14

## 2023-04-12 RX ORDER — MORPHINE SULFATE 50 MG/1
0.2 CAPSULE, EXTENDED RELEASE ORAL ONCE
Refills: 0 | Status: DISCONTINUED | OUTPATIENT
Start: 2023-04-12 | End: 2023-04-12

## 2023-04-12 RX ORDER — KETOROLAC TROMETHAMINE 30 MG/ML
15 SYRINGE (ML) INJECTION EVERY 6 HOURS
Refills: 0 | Status: DISCONTINUED | OUTPATIENT
Start: 2023-04-12 | End: 2023-04-13

## 2023-04-12 RX ADMIN — Medication 2.5 MILLIGRAM(S): at 18:06

## 2023-04-12 RX ADMIN — PANTOPRAZOLE SODIUM 40 MILLIGRAM(S): 20 TABLET, DELAYED RELEASE ORAL at 15:01

## 2023-04-12 RX ADMIN — Medication 2.5 MILLIGRAM(S): at 05:20

## 2023-04-12 RX ADMIN — ATORVASTATIN CALCIUM 40 MILLIGRAM(S): 80 TABLET, FILM COATED ORAL at 21:36

## 2023-04-12 RX ADMIN — Medication 1000 MILLIGRAM(S): at 19:13

## 2023-04-12 RX ADMIN — Medication 400 MILLIGRAM(S): at 18:07

## 2023-04-12 RX ADMIN — ISOSORBIDE DINITRATE 10 MILLIGRAM(S): 5 TABLET ORAL at 05:20

## 2023-04-12 RX ADMIN — BUDESONIDE AND FORMOTEROL FUMARATE DIHYDRATE 2 PUFF(S): 160; 4.5 AEROSOL RESPIRATORY (INHALATION) at 21:35

## 2023-04-12 RX ADMIN — MORPHINE SULFATE 0.2 MILLIGRAM(S): 50 CAPSULE, EXTENDED RELEASE ORAL at 08:15

## 2023-04-12 RX ADMIN — DEXTROSE MONOHYDRATE, SODIUM CHLORIDE, AND POTASSIUM CHLORIDE 125 MILLILITER(S): 50; .745; 4.5 INJECTION, SOLUTION INTRAVENOUS at 02:49

## 2023-04-12 RX ADMIN — Medication 81 MILLIGRAM(S): at 15:02

## 2023-04-12 NOTE — BRIEF OPERATIVE NOTE - OPERATION/FINDINGS
lap converted to open R hemicolectomy  - Extensive omental adhesions to the anterior abdominal wall along the midline where intraperitoneal mesh was noted. Lysis of adhesions carried out laparoscopically  - Cecal mass noted to be densely adhered to the LUQ inferior to the liver, unable to be mobilized laparoscopically. Decision made to convert to open  - 2 areas of small serosal tear on small bowel oversen with vircyl  - R colon mobilized completely up to mid transverse colon. Ileocolic and R colic vessels taken with ligasure. Proximal margin ~15cm from the ileocolic valve, and distal margin mid transverse colon, ileocolic anastomosis created utilizing remi technique  - Specimen removed, hemostasis confirmed, abdomen irrigated, fascia closed with #1 loop PDS x 2, subcutaneous penrose drain placed midline incision

## 2023-04-12 NOTE — PROGRESS NOTE ADULT - SUBJECTIVE AND OBJECTIVE BOX
Surgery Daily Progress Note  =====================================================  SUBJECTIVE: A 72y Female Patient seen and examined at bedside on AM rounds.     ALLERGIES:  No Known Allergies  --------------------------------------------------------------------------------------    MEDICATIONS:    Neurologic Medications  ondansetron Injectable 4 milliGRAM(s) IV Push once    Respiratory Medications  albuterol    90 MICROgram(s) HFA Inhaler 2 Puff(s) Inhalation every 6 hours PRN Shortness of Breath and/or Wheezing  budesonide 160 MICROgram(s)/formoterol 4.5 MICROgram(s) Inhaler 2 Puff(s) Inhalation two times a day  tiotropium 2.5 MICROgram(s) Inhaler 1 Puff(s) Inhalation daily    Cardiovascular Medications  isosorbide   dinitrate Tablet (ISORDIL) 10 milliGRAM(s) Oral three times a day  metoprolol tartrate Injectable 2.5 milliGRAM(s) IV Push every 12 hours    Gastrointestinal Medications  dextrose 5% + sodium chloride 0.45% with potassium chloride 20 mEq/L 1000 milliLiter(s) IV Continuous <Continuous>  pantoprazole  Injectable 40 milliGRAM(s) IV Push every 24 hours    Genitourinary Medications    Hematologic/Oncologic Medications  aspirin  chewable 81 milliGRAM(s) Oral daily    Antimicrobial/Immunologic Medications    Endocrine/Metabolic Medications  atorvastatin 40 milliGRAM(s) Oral at bedtime    Topical/Other Medications    --------------------------------------------------------------------------------------    VITAL SIGNS:  No Known Allergies      T(C): 36.5 (04-12-23 @ 07:15), Max: 36.8 (04-12-23 @ 01:42)  HR: 65 (04-12-23 @ 07:15) (65 - 89)  BP: 133/94 (04-12-23 @ 07:15) (133/94 - 160/78)  RR: 16 (04-12-23 @ 07:15) (16 - 18)  SpO2: 97% (04-12-23 @ 07:15) (97% - 100%)  --------------------------------------------------------------------------------------    EXAM    General: NAD, resting in bed comfortably.  Cardiac: regular rate, warm and well perfused  Respiratory: Nonlabored respirations, normal cw expansion.  Abdomen: soft, nontender, nondistended. NGT in place.  Extremities: normal strength, FROM, no deformities    --------------------------------------------------------------------------------------    I&Os  T(C): 36.5 (04-12-23 @ 07:15), Max: 36.8 (04-12-23 @ 01:42)  HR: 65 (04-12-23 @ 07:15) (65 - 89)  BP: 133/94 (04-12-23 @ 07:15) (133/94 - 160/78)  RR: 16 (04-12-23 @ 07:15) (16 - 18)  SpO2: 97% (04-12-23 @ 07:15) (97% - 100%)  --------------------------------------------------------------------------------------    LABS                          10.7   5.52  )-----------( 319      ( 12 Apr 2023 06:38 )             33.0     04-12    135  |  106  |  3<L>  ----------------------------<  320<H>  4.8   |  21<L>  |  0.62    Ca    8.1<L>      12 Apr 2023 06:38  Phos  3.8     04-12  Mg     1.80     04-12      PT/INR - ( 12 Apr 2023 06:38 )   PT: 15.5 sec;   INR: 1.33 ratio         PTT - ( 12 Apr 2023 06:38 )  PTT:29.2 sec      04-11-23 @ 07:01  -  04-12-23 @ 07:00  --------------------------------------------------------  IN: 1500 mL / OUT: 0 mL / NET: 1500 mL      albuterol    90 MICROgram(s) HFA Inhaler 2 Puff(s) Inhalation every 6 hours PRN  aspirin  chewable 81 milliGRAM(s) Oral daily  atorvastatin 40 milliGRAM(s) Oral at bedtime  budesonide 160 MICROgram(s)/formoterol 4.5 MICROgram(s) Inhaler 2 Puff(s) Inhalation two times a day  dextrose 5% + sodium chloride 0.45% with potassium chloride 20 mEq/L 1000 milliLiter(s) IV Continuous <Continuous>  isosorbide   dinitrate Tablet (ISORDIL) 10 milliGRAM(s) Oral three times a day  metoprolol tartrate Injectable 2.5 milliGRAM(s) IV Push every 12 hours  ondansetron Injectable 4 milliGRAM(s) IV Push once  pantoprazole  Injectable 40 milliGRAM(s) IV Push every 24 hours  tiotropium 2.5 MICROgram(s) Inhaler 1 Puff(s) Inhalation daily      RADIOLOGY, EKG & ADDITIONAL TESTS: Reviewed.

## 2023-04-12 NOTE — CHART NOTE - NSCHARTNOTEFT_GEN_A_CORE
POST-OP NOTE    JUSTINE ANAYA | [id number] | [facility]    Procedure: s/p Laparoscopic converted to open Right Hemicolectomy    Interval:  BP at low 100s systolic (4 hours postop), HR also in low 100s.     Subjective:  Patient reports no abdominal pain, controlled with medication  Patient     Vital Signs Last 24 Hrs  T(C): 36.2 (12 Apr 2023 17:52), Max: 36.8 (12 Apr 2023 01:42)  T(F): 97.2 (12 Apr 2023 17:52), Max: 98.2 (12 Apr 2023 01:42)  HR: 107 (12 Apr 2023 17:52) (65 - 107)  BP: 117/83 (12 Apr 2023 17:52) (98/54 - 160/78)  BP(mean): 79 (12 Apr 2023 16:00) (64 - 79)  RR: 18 (12 Apr 2023 17:52) (12 - 19)  SpO2: 100% (12 Apr 2023 17:52) (92% - 100%)    Parameters below as of 12 Apr 2023 17:52  Patient On (Oxygen Delivery Method): room air      I&O's Summary    11 Apr 2023 07:01  -  12 Apr 2023 07:00  --------------------------------------------------------  IN: 1500 mL / OUT: 0 mL / NET: 1500 mL    12 Apr 2023 07:01  -  12 Apr 2023 18:22  --------------------------------------------------------  IN: 70 mL / OUT: 175 mL / NET: -105 mL                            10.7   5.52  )-----------( 319      ( 12 Apr 2023 06:38 )             33.0     04-12    135  |  106  |  3<L>  ----------------------------<  320<H>  4.8   |  21<L>  |  0.62    Ca    8.1<L>      12 Apr 2023 06:38  Phos  3.8     04-12  Mg     1.80     04-12     PT/INR - ( 12 Apr 2023 06:38 )   PT: 15.5 sec;   INR: 1.33 ratio         PTT - ( 12 Apr 2023 06:38 )  PTT:29.2 sec    PHYSICAL EXAM:  Gen: NAD  Resp: breathing easily, no stridor  CV: RRR  Abdomen: soft, nontender, nondistended  Skin: Incision c/d/i. Normal color, no rashes or lesions POST-OP NOTE    JUSTINE ANAYA | [id number] | [facility]    Procedure: s/p Laparoscopic converted to open Right Hemicolectomy    Interval:  BP at low 100s systolic (4 hours postop), HR also in low 100s. Decided to hold give Metroprolol and hold nitrate for another until BP rises. Bp improved within the hour, to 120s systolic.    Subjective:  Patient reports no abdominal pain, controlled with medication  Patient is extremely grateful for the surgery.  Denies chest pain, SOB, fever/chills.  Patient has not been ambulating    Vital Signs Last 24 Hrs  T(C): 36.2 (12 Apr 2023 17:52), Max: 36.8 (12 Apr 2023 01:42)  T(F): 97.2 (12 Apr 2023 17:52), Max: 98.2 (12 Apr 2023 01:42)  HR: 107 (12 Apr 2023 17:52) (65 - 107)  BP: 117/83 (12 Apr 2023 17:52) (98/54 - 160/78)  BP(mean): 79 (12 Apr 2023 16:00) (64 - 79)  RR: 18 (12 Apr 2023 17:52) (12 - 19)  SpO2: 100% (12 Apr 2023 17:52) (92% - 100%)    Parameters below as of 12 Apr 2023 17:52  Patient On (Oxygen Delivery Method): room air      I&O's Summary    11 Apr 2023 07:01  -  12 Apr 2023 07:00  --------------------------------------------------------  IN: 1500 mL / OUT: 0 mL / NET: 1500 mL    12 Apr 2023 07:01  -  12 Apr 2023 18:22  --------------------------------------------------------  IN: 70 mL / OUT: 175 mL / NET: -105 mL                            10.7   5.52  )-----------( 319      ( 12 Apr 2023 06:38 )             33.0     04-12    135  |  106  |  3<L>  ----------------------------<  320<H>  4.8   |  21<L>  |  0.62    Ca    8.1<L>      12 Apr 2023 06:38  Phos  3.8     04-12  Mg     1.80     04-12     PT/INR - ( 12 Apr 2023 06:38 )   PT: 15.5 sec;   INR: 1.33 ratio         PTT - ( 12 Apr 2023 06:38 )  PTT:29.2 sec    PHYSICAL EXAM:  Gen: NAD, A&Ox3  Resp: breathing easily, no stridor  CV: RRR  Abdomen: soft, nontender, nondistended, port incisions c/d/i, midline incision w/ mild strikethrough noted (no active drainage)  Skin: Normal color, no rashes or lesions

## 2023-04-12 NOTE — PROVIDER CONTACT NOTE (OTHER) - SITUATION
Patient refuses blood glucose monitoring.
patient came to the floor with a  bp of 108/72, rechecked 117/83

## 2023-04-12 NOTE — PROGRESS NOTE ADULT - ASSESSMENT
Assessment:  72F pmh HTN, COPD (worked at [employer], no hx tobacco use), open ccx, incisional hernia repair with mesh, presenting with abdominal discomfort of since 3/12. 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    Plan:  - Diet: NPO  - IVF  - Activity as tolerated  - DVT PPx  - Pain management  - OR today    A-Team  [id number]

## 2023-04-13 LAB
ANION GAP SERPL CALC-SCNC: 9 MMOL/L — SIGNIFICANT CHANGE UP (ref 7–14)
BUN SERPL-MCNC: 9 MG/DL — SIGNIFICANT CHANGE UP (ref 7–23)
CALCIUM SERPL-MCNC: 8.2 MG/DL — LOW (ref 8.4–10.5)
CHLORIDE SERPL-SCNC: 105 MMOL/L — SIGNIFICANT CHANGE UP (ref 98–107)
CO2 SERPL-SCNC: 23 MMOL/L — SIGNIFICANT CHANGE UP (ref 22–31)
CREAT SERPL-MCNC: 0.85 MG/DL — SIGNIFICANT CHANGE UP (ref 0.5–1.3)
EGFR: 73 ML/MIN/1.73M2 — SIGNIFICANT CHANGE UP
GLUCOSE BLDC GLUCOMTR-MCNC: 108 MG/DL — HIGH (ref 70–99)
GLUCOSE BLDC GLUCOMTR-MCNC: 108 MG/DL — HIGH (ref 70–99)
GLUCOSE SERPL-MCNC: 100 MG/DL — HIGH (ref 70–99)
HCT VFR BLD CALC: 28.7 % — LOW (ref 34.5–45)
HGB BLD-MCNC: 9.3 G/DL — LOW (ref 11.5–15.5)
MAGNESIUM SERPL-MCNC: 1.6 MG/DL — SIGNIFICANT CHANGE UP (ref 1.6–2.6)
MCHC RBC-ENTMCNC: 28.3 PG — SIGNIFICANT CHANGE UP (ref 27–34)
MCHC RBC-ENTMCNC: 32.4 GM/DL — SIGNIFICANT CHANGE UP (ref 32–36)
MCV RBC AUTO: 87.2 FL — SIGNIFICANT CHANGE UP (ref 80–100)
NRBC # BLD: 0 /100 WBCS — SIGNIFICANT CHANGE UP (ref 0–0)
NRBC # FLD: 0 K/UL — SIGNIFICANT CHANGE UP (ref 0–0)
PHOSPHATE SERPL-MCNC: 4.9 MG/DL — HIGH (ref 2.5–4.5)
PLATELET # BLD AUTO: 265 K/UL — SIGNIFICANT CHANGE UP (ref 150–400)
POTASSIUM SERPL-MCNC: 4 MMOL/L — SIGNIFICANT CHANGE UP (ref 3.5–5.3)
POTASSIUM SERPL-SCNC: 4 MMOL/L — SIGNIFICANT CHANGE UP (ref 3.5–5.3)
RBC # BLD: 3.29 M/UL — LOW (ref 3.8–5.2)
RBC # FLD: 13.7 % — SIGNIFICANT CHANGE UP (ref 10.3–14.5)
SODIUM SERPL-SCNC: 137 MMOL/L — SIGNIFICANT CHANGE UP (ref 135–145)
WBC # BLD: 18.32 K/UL — HIGH (ref 3.8–10.5)
WBC # FLD AUTO: 18.32 K/UL — HIGH (ref 3.8–10.5)

## 2023-04-13 RX ORDER — HEPARIN SODIUM 5000 [USP'U]/ML
5000 INJECTION INTRAVENOUS; SUBCUTANEOUS EVERY 8 HOURS
Refills: 0 | Status: DISCONTINUED | OUTPATIENT
Start: 2023-04-13 | End: 2023-04-15

## 2023-04-13 RX ORDER — MAGNESIUM SULFATE 500 MG/ML
2 VIAL (ML) INJECTION ONCE
Refills: 0 | Status: COMPLETED | OUTPATIENT
Start: 2023-04-13 | End: 2023-04-13

## 2023-04-13 RX ADMIN — ATORVASTATIN CALCIUM 40 MILLIGRAM(S): 80 TABLET, FILM COATED ORAL at 21:51

## 2023-04-13 RX ADMIN — Medication 400 MILLIGRAM(S): at 12:35

## 2023-04-13 RX ADMIN — Medication 400 MILLIGRAM(S): at 00:10

## 2023-04-13 RX ADMIN — HYDROMORPHONE HYDROCHLORIDE 1 MILLIGRAM(S): 2 INJECTION INTRAMUSCULAR; INTRAVENOUS; SUBCUTANEOUS at 22:48

## 2023-04-13 RX ADMIN — HYDROMORPHONE HYDROCHLORIDE 1 MILLIGRAM(S): 2 INJECTION INTRAMUSCULAR; INTRAVENOUS; SUBCUTANEOUS at 22:18

## 2023-04-13 RX ADMIN — Medication 15 MILLIGRAM(S): at 18:00

## 2023-04-13 RX ADMIN — Medication 15 MILLIGRAM(S): at 11:28

## 2023-04-13 RX ADMIN — HEPARIN SODIUM 5000 UNIT(S): 5000 INJECTION INTRAVENOUS; SUBCUTANEOUS at 21:50

## 2023-04-13 RX ADMIN — Medication 1000 MILLIGRAM(S): at 00:40

## 2023-04-13 RX ADMIN — Medication 25 GRAM(S): at 10:55

## 2023-04-13 RX ADMIN — Medication 15 MILLIGRAM(S): at 10:58

## 2023-04-13 RX ADMIN — PANTOPRAZOLE SODIUM 40 MILLIGRAM(S): 20 TABLET, DELAYED RELEASE ORAL at 12:45

## 2023-04-13 RX ADMIN — Medication 1000 MILLIGRAM(S): at 13:05

## 2023-04-13 RX ADMIN — Medication 15 MILLIGRAM(S): at 18:30

## 2023-04-13 RX ADMIN — Medication 81 MILLIGRAM(S): at 12:35

## 2023-04-13 RX ADMIN — ISOSORBIDE DINITRATE 10 MILLIGRAM(S): 5 TABLET ORAL at 12:35

## 2023-04-13 RX ADMIN — BUDESONIDE AND FORMOTEROL FUMARATE DIHYDRATE 2 PUFF(S): 160; 4.5 AEROSOL RESPIRATORY (INHALATION) at 21:02

## 2023-04-13 RX ADMIN — Medication 2.5 MILLIGRAM(S): at 19:01

## 2023-04-13 RX ADMIN — HEPARIN SODIUM 5000 UNIT(S): 5000 INJECTION INTRAVENOUS; SUBCUTANEOUS at 19:04

## 2023-04-13 RX ADMIN — ISOSORBIDE DINITRATE 10 MILLIGRAM(S): 5 TABLET ORAL at 19:00

## 2023-04-13 NOTE — PHYSICAL THERAPY INITIAL EVALUATION ADULT - PERTINENT HX OF CURRENT PROBLEM, REHAB EVAL
72F pmh HTN, COPD (worked at [employer], no hx tobacco use), open ccx, incisional hernia repair with mesh, presenting with abdominal discomfort of since 3/12.  Patient reports diarrhea progressing to blood in bm on 3/12. Also had difficulty eating with n/v. She went to [facility] where a CT showed a mass in the cecum. she underwent colonoscopy (dr. Danny Parisi) with bx which came back as malignant per patient. She also had an abdominal wall implant that was bx but does not know the results. She was discharged from that hospital two days prior to presentation at [facility] and wanted to pursue care at [facility] to be closer to her daughter. She reports no flatus for past two days with only liquid stool. Denies weight loss. No family history of colon cancer.
Patient is 72 year old female, history of HTN, COPD (worked at [employer], no history of tobacco use),, incisional hernia repair with mesh, presenting with abdominal discomfort of since 3/12. 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

## 2023-04-13 NOTE — PROGRESS NOTE ADULT - ASSESSMENT
72F pmh HTN, COPD (worked at [employer], no hx tobacco use), open ccx, incisional hernia repair with mesh, presenting with abdominal discomfort of since 3/12. 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 POD-1 from laparoscopic converted to open right hemicolectomy    Plan:  - Diet: CLD  - IVF  - Activity as tolerated  - DVT PPx  - Pain management  - Discontinue Kitchen and TOV    A-Team  [id number]

## 2023-04-13 NOTE — PHYSICAL THERAPY INITIAL EVALUATION ADULT - MANUAL MUSCLE TESTING RESULTS, REHAB EVAL
both UE / LE 3+/5
Bilateral UE grossly >/= 3/5, bilateral LE grossly >/= 3+/5/grossly assessed due to

## 2023-04-13 NOTE — PHYSICAL THERAPY INITIAL EVALUATION ADULT - CRITERIA FOR SKILLED THERAPEUTIC INTERVENTIONS
anticipated discharge recommendation
impairments found/functional limitations in following categories

## 2023-04-13 NOTE — PHYSICAL THERAPY INITIAL EVALUATION ADULT - SITTING BALANCE: STATIC
PORTABLE CHEST:

 

02/02/2020

 

1449 HOURS

 

COMPARISON:

12/16/2019

 

FINDINGS:

This portable film shows an infiltrate and effusion in the right lung base. The heart is borderline i
n size and the vasculature seems mildly congested. There are no large effusions on the left.

 

IMPRESSION:

1. Mild congestive changes.

 

2. While the opacity in the right base could just be pleural fluid, I would be suspicious of an under
lying pneumonia as well, as there does appear to be infiltrate here in addition to fluid. Correlate w
ith clinical presentation and labs.

 

CODE T

 

POS: HOME
good balance
good balance

## 2023-04-13 NOTE — PROGRESS NOTE ADULT - SUBJECTIVE AND OBJECTIVE BOX
Anesthesia Pain Management Service    SUBJECTIVE: Patient s/p spinal morphine with pain managable and no problems. Patient denies headache, numbness and tingling.  Pain Scale Score: 0/10  Refer to charted pain scores    THERAPY:    s/p spinal PF morphine yesterday.      MEDICATIONS  (STANDING):  acetaminophen   IVPB .. 1000 milliGRAM(s) IV Intermittent every 6 hours  aspirin  chewable 81 milliGRAM(s) Oral daily  atorvastatin 40 milliGRAM(s) Oral at bedtime  budesonide 160 MICROgram(s)/formoterol 4.5 MICROgram(s) Inhaler 2 Puff(s) Inhalation two times a day  heparin   Injectable 5000 Unit(s) SubCutaneous every 8 hours  isosorbide   dinitrate Tablet (ISORDIL) 10 milliGRAM(s) Oral three times a day  ketorolac   Injectable 15 milliGRAM(s) IV Push every 6 hours  lactated ringers. 1000 milliLiter(s) (75 mL/Hr) IV Continuous <Continuous>  magnesium sulfate  IVPB 2 Gram(s) IV Intermittent once  metoprolol tartrate Injectable 2.5 milliGRAM(s) IV Push every 12 hours  pantoprazole  Injectable 40 milliGRAM(s) IV Push every 24 hours  tiotropium 2.5 MICROgram(s) Inhaler 1 Puff(s) Inhalation daily    MEDICATIONS  (PRN):  albuterol    90 MICROgram(s) HFA Inhaler 2 Puff(s) Inhalation every 6 hours PRN Shortness of Breath and/or Wheezing  butorphanol Injectable 0.125 milliGRAM(s) IV Push every 6 hours PRN Pruritus  HYDROmorphone  Injectable 1 milliGRAM(s) IV Push every 3 hours PRN Severe Pain (7 - 10)  nalbuphine Injectable 2.5 milliGRAM(s) IV Push every 6 hours PRN Pruritus  naloxone Injectable 0.1 milliGRAM(s) IV Push every 3 minutes PRN For ANY of the following changes in patient status:  A. RR LESS THAN 10 breaths per minute, B. Oxygen saturation LESS THAN 90%, C. Sedation score of 6  ondansetron Injectable 4 milliGRAM(s) IV Push every 6 hours PRN Nausea  oxyCODONE    IR 2.5 milliGRAM(s) Oral every 4 hours PRN Moderate Pain (4 - 6)  oxyCODONE    IR 5 milliGRAM(s) Oral every 4 hours PRN Severe Pain (7 - 10)      OBJECTIVE: Patient sitting up in bed.    Sedation Score:	[ x] Alert	[ ] Drowsy	[ ] Arousable	[ ] Asleep	[ ] Unresponsive    Side Effects:	[ x] None	[ ] Nausea	[ ] Vomiting	[ ] Pruritus  		  [ ] Weakness		[ ] Numbness	[ ] Other:    Vital Signs Last 24 Hrs  T(C): 36.6 (13 Apr 2023 06:00), Max: 37.1 (12 Apr 2023 23:04)  T(F): 97.8 (13 Apr 2023 06:00), Max: 98.7 (12 Apr 2023 23:04)  HR: 76 (13 Apr 2023 06:00) (73 - 107)  BP: 105/63 (13 Apr 2023 06:00) (98/54 - 117/83)  BP(mean): 79 (12 Apr 2023 16:00) (64 - 79)  RR: 18 (13 Apr 2023 06:00) (12 - 21)  SpO2: 100% (13 Apr 2023 06:00) (92% - 100%)    Parameters below as of 13 Apr 2023 06:00  Patient On (Oxygen Delivery Method): room air        ASSESSMENT/ PLAN  [x ] Patient transitioned to prn analgesics  [x] Pain management per primary service, pain service to sign off   [x]Documentation and Verification of current medications     Comments: PRN Oral/IV opioids and/or non-opioid Adjuvant analgesics to be used at this point.    Progress Note written now but Patient was seen earlier.

## 2023-04-13 NOTE — PHYSICAL THERAPY INITIAL EVALUATION ADULT - PLANNED THERAPY INTERVENTIONS, PT EVAL
balance training/gait training/transfer training
balance training/bed mobility training/gait training/strengthening/transfer training

## 2023-04-13 NOTE — PHYSICAL THERAPY INITIAL EVALUATION ADULT - ADDITIONAL COMMENTS
Pt states she lives alone in an apartment +elevator. Prior to admission, pt was independent with ADLs and ambulated using a rollator.   Post PT evaluation, pt left seated at edge of bed, alarm on, call bell and remote within reach, all precautions maintained, NAD. JENIFER felix aware.
Patient report lives alone in apartment, ambulates with a rollator.

## 2023-04-13 NOTE — ADVANCED PRACTICE NURSE CONSULT - REASON FOR CONSULT
Patient seen after referral for new potential ostomy. Patient is a 72F pmh HTN, COPD (worked at [employer], no hx tobacco use), open ccx, incisional hernia repair with mesh, presenting with abdominal discomfort of since 3/12. 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 POD-1 from laparoscopic converted to open right hemicolectomy. At this time, patient did not received an ostomy as a result of the surgery and ostomy consult is no longer indicated. Please contact Wound/Ostomy Care Service Line if we can be of further assistance (ext [phone]).

## 2023-04-13 NOTE — PROGRESS NOTE ADULT - SUBJECTIVE AND OBJECTIVE BOX
Surgery Daily Progress Note  =====================================================  SUBJECTIVE: A 72y Female Patient seen and examined at bedside on AM rounds.     ALLERGIES:  No Known Allergies      --------------------------------------------------------------------------------------    MEDICATIONS:    Neurologic Medications  acetaminophen   IVPB .. 1000 milliGRAM(s) IV Intermittent every 6 hours  butorphanol Injectable 0.125 milliGRAM(s) IV Push every 6 hours PRN Pruritus  HYDROmorphone  Injectable 1 milliGRAM(s) IV Push every 3 hours PRN Severe Pain (7 - 10)  ketorolac   Injectable 15 milliGRAM(s) IV Push every 6 hours  nalbuphine Injectable 2.5 milliGRAM(s) IV Push every 6 hours PRN Pruritus  ondansetron Injectable 4 milliGRAM(s) IV Push every 6 hours PRN Nausea  ondansetron Injectable 4 milliGRAM(s) IV Push once  oxyCODONE    IR 2.5 milliGRAM(s) Oral every 4 hours PRN Moderate Pain (4 - 6)  oxyCODONE    IR 5 milliGRAM(s) Oral every 4 hours PRN Severe Pain (7 - 10)    Respiratory Medications  albuterol    90 MICROgram(s) HFA Inhaler 2 Puff(s) Inhalation every 6 hours PRN Shortness of Breath and/or Wheezing  budesonide 160 MICROgram(s)/formoterol 4.5 MICROgram(s) Inhaler 2 Puff(s) Inhalation two times a day  tiotropium 2.5 MICROgram(s) Inhaler 1 Puff(s) Inhalation daily    Cardiovascular Medications  isosorbide   dinitrate Tablet (ISORDIL) 10 milliGRAM(s) Oral three times a day  metoprolol tartrate Injectable 2.5 milliGRAM(s) IV Push every 12 hours    Gastrointestinal Medications  lactated ringers. 1000 milliLiter(s) IV Continuous <Continuous>  magnesium sulfate  IVPB 2 Gram(s) IV Intermittent once  pantoprazole  Injectable 40 milliGRAM(s) IV Push every 24 hours    Genitourinary Medications    Hematologic/Oncologic Medications  aspirin  chewable 81 milliGRAM(s) Oral daily  heparin   Injectable 5000 Unit(s) SubCutaneous every 8 hours    Antimicrobial/Immunologic Medications    Endocrine/Metabolic Medications  atorvastatin 40 milliGRAM(s) Oral at bedtime    Topical/Other Medications  naloxone Injectable 0.1 milliGRAM(s) IV Push every 3 minutes PRN For ANY of the following changes in patient status:  A. RR LESS THAN 10 breaths per minute, B. Oxygen saturation LESS THAN 90%, C. Sedation score of 6    --------------------------------------------------------------------------------------    VITAL SIGNS:  No Known Allergies      T(C): 36.6 (04-13-23 @ 06:00), Max: 37.1 (04-12-23 @ 23:04)  HR: 76 (04-13-23 @ 06:00) (73 - 107)  BP: 105/63 (04-13-23 @ 06:00) (98/54 - 117/83)  RR: 18 (04-13-23 @ 06:00) (12 - 21)  SpO2: 100% (04-13-23 @ 06:00) (92% - 100%)  --------------------------------------------------------------------------------------    EXAM    General: NAD, resting in bed comfortably.  Cardiac: regular rate, warm and well perfused  Respiratory: Nonlabored respirations, normal cw expansion.  Abdomen: soft, nontender, nondistended.  Extremities: normal strength, FROM, no deformities    --------------------------------------------------------------------------------------    I&Os  T(C): 36.6 (04-13-23 @ 06:00), Max: 37.1 (04-12-23 @ 23:04)  HR: 76 (04-13-23 @ 06:00) (73 - 107)  BP: 105/63 (04-13-23 @ 06:00) (98/54 - 117/83)  RR: 18 (04-13-23 @ 06:00) (12 - 21)  SpO2: 100% (04-13-23 @ 06:00) (92% - 100%)  --------------------------------------------------------------------------------------    LABS                          9.3    x     )-----------( 265      ( 13 Apr 2023 06:00 )             28.7     04-13    137  |  105  |  9   ----------------------------<  100<H>  4.0   |  23  |  0.85    Ca    8.2<L>      13 Apr 2023 06:00  Phos  4.9     04-13  Mg     1.60     04-13      PT/INR - ( 12 Apr 2023 06:38 )   PT: 15.5 sec;   INR: 1.33 ratio         PTT - ( 12 Apr 2023 06:38 )  PTT:29.2 sec      04-12-23 @ 07:01  -  04-13-23 @ 07:00  --------------------------------------------------------  IN: 590 mL / OUT: 1725 mL / NET: -1135 mL      acetaminophen   IVPB .. 1000 milliGRAM(s) IV Intermittent every 6 hours  albuterol    90 MICROgram(s) HFA Inhaler 2 Puff(s) Inhalation every 6 hours PRN  aspirin  chewable 81 milliGRAM(s) Oral daily  atorvastatin 40 milliGRAM(s) Oral at bedtime  budesonide 160 MICROgram(s)/formoterol 4.5 MICROgram(s) Inhaler 2 Puff(s) Inhalation two times a day  butorphanol Injectable 0.125 milliGRAM(s) IV Push every 6 hours PRN  heparin   Injectable 5000 Unit(s) SubCutaneous every 8 hours  HYDROmorphone  Injectable 1 milliGRAM(s) IV Push every 3 hours PRN  isosorbide   dinitrate Tablet (ISORDIL) 10 milliGRAM(s) Oral three times a day  ketorolac   Injectable 15 milliGRAM(s) IV Push every 6 hours  lactated ringers. 1000 milliLiter(s) IV Continuous <Continuous>  magnesium sulfate  IVPB 2 Gram(s) IV Intermittent once  metoprolol tartrate Injectable 2.5 milliGRAM(s) IV Push every 12 hours  nalbuphine Injectable 2.5 milliGRAM(s) IV Push every 6 hours PRN  naloxone Injectable 0.1 milliGRAM(s) IV Push every 3 minutes PRN  ondansetron Injectable 4 milliGRAM(s) IV Push every 6 hours PRN  ondansetron Injectable 4 milliGRAM(s) IV Push once  oxyCODONE    IR 2.5 milliGRAM(s) Oral every 4 hours PRN  oxyCODONE    IR 5 milliGRAM(s) Oral every 4 hours PRN  pantoprazole  Injectable 40 milliGRAM(s) IV Push every 24 hours  tiotropium 2.5 MICROgram(s) Inhaler 1 Puff(s) Inhalation daily      RADIOLOGY, EKG & ADDITIONAL TESTS: Reviewed.

## 2023-04-13 NOTE — PHYSICAL THERAPY INITIAL EVALUATION ADULT - GENERAL OBSERVATIONS, REHAB EVAL
Pt received semi-supine in bed, with all lines intact, NAD, all precautions maintained. SpO2: 100% on RA
Patient seen semi supine in bed, connected to NGT and I.V. line.

## 2023-04-14 ENCOUNTER — TRANSCRIPTION ENCOUNTER (OUTPATIENT)
Age: 73
End: 2023-04-14

## 2023-04-14 LAB
ANION GAP SERPL CALC-SCNC: 7 MMOL/L — SIGNIFICANT CHANGE UP (ref 7–14)
BUN SERPL-MCNC: 13 MG/DL — SIGNIFICANT CHANGE UP (ref 7–23)
CALCIUM SERPL-MCNC: 8.1 MG/DL — LOW (ref 8.4–10.5)
CHLORIDE SERPL-SCNC: 103 MMOL/L — SIGNIFICANT CHANGE UP (ref 98–107)
CO2 SERPL-SCNC: 24 MMOL/L — SIGNIFICANT CHANGE UP (ref 22–31)
CREAT SERPL-MCNC: 0.72 MG/DL — SIGNIFICANT CHANGE UP (ref 0.5–1.3)
EGFR: 89 ML/MIN/1.73M2 — SIGNIFICANT CHANGE UP
GLUCOSE SERPL-MCNC: 94 MG/DL — SIGNIFICANT CHANGE UP (ref 70–99)
HCT VFR BLD CALC: 24.8 % — LOW (ref 34.5–45)
HGB BLD-MCNC: 8.1 G/DL — LOW (ref 11.5–15.5)
MAGNESIUM SERPL-MCNC: 1.8 MG/DL — SIGNIFICANT CHANGE UP (ref 1.6–2.6)
MCHC RBC-ENTMCNC: 28.6 PG — SIGNIFICANT CHANGE UP (ref 27–34)
MCHC RBC-ENTMCNC: 32.7 GM/DL — SIGNIFICANT CHANGE UP (ref 32–36)
MCV RBC AUTO: 87.6 FL — SIGNIFICANT CHANGE UP (ref 80–100)
NRBC # BLD: 0 /100 WBCS — SIGNIFICANT CHANGE UP (ref 0–0)
NRBC # FLD: 0 K/UL — SIGNIFICANT CHANGE UP (ref 0–0)
PHOSPHATE SERPL-MCNC: 2.9 MG/DL — SIGNIFICANT CHANGE UP (ref 2.5–4.5)
PLATELET # BLD AUTO: 232 K/UL — SIGNIFICANT CHANGE UP (ref 150–400)
POTASSIUM SERPL-MCNC: 3.7 MMOL/L — SIGNIFICANT CHANGE UP (ref 3.5–5.3)
POTASSIUM SERPL-SCNC: 3.7 MMOL/L — SIGNIFICANT CHANGE UP (ref 3.5–5.3)
RBC # BLD: 2.83 M/UL — LOW (ref 3.8–5.2)
RBC # FLD: 13.8 % — SIGNIFICANT CHANGE UP (ref 10.3–14.5)
SODIUM SERPL-SCNC: 134 MMOL/L — LOW (ref 135–145)
WBC # BLD: 12.35 K/UL — HIGH (ref 3.8–10.5)
WBC # FLD AUTO: 12.35 K/UL — HIGH (ref 3.8–10.5)

## 2023-04-14 RX ORDER — METOPROLOL TARTRATE 50 MG
0.5 TABLET ORAL
Qty: 30 | Refills: 0
Start: 2023-04-14 | End: 2023-05-13

## 2023-04-14 RX ORDER — APIXABAN 2.5 MG/1
1 TABLET, FILM COATED ORAL
Qty: 56 | Refills: 0
Start: 2023-04-14 | End: 2023-05-11

## 2023-04-14 RX ORDER — ASPIRIN/CALCIUM CARB/MAGNESIUM 324 MG
1 TABLET ORAL
Qty: 30 | Refills: 0
Start: 2023-04-14 | End: 2023-05-13

## 2023-04-14 RX ORDER — ACETAMINOPHEN 500 MG
1000 TABLET ORAL EVERY 6 HOURS
Refills: 0 | Status: DISCONTINUED | OUTPATIENT
Start: 2023-04-14 | End: 2023-04-15

## 2023-04-14 RX ORDER — ROSUVASTATIN CALCIUM 5 MG/1
1 TABLET ORAL
Refills: 0 | DISCHARGE

## 2023-04-14 RX ORDER — ATORVASTATIN CALCIUM 80 MG/1
1 TABLET, FILM COATED ORAL
Qty: 30 | Refills: 0
Start: 2023-04-14 | End: 2023-05-13

## 2023-04-14 RX ORDER — ACETAMINOPHEN 500 MG
2 TABLET ORAL
Qty: 0 | Refills: 0 | DISCHARGE
Start: 2023-04-14

## 2023-04-14 RX ORDER — ASPIRIN/CALCIUM CARB/MAGNESIUM 324 MG
1 TABLET ORAL
Refills: 0 | DISCHARGE

## 2023-04-14 RX ORDER — PANTOPRAZOLE SODIUM 20 MG/1
40 TABLET, DELAYED RELEASE ORAL
Refills: 0 | Status: DISCONTINUED | OUTPATIENT
Start: 2023-04-14 | End: 2023-04-15

## 2023-04-14 RX ORDER — ONDANSETRON 8 MG/1
4 TABLET, FILM COATED ORAL ONCE
Refills: 0 | Status: COMPLETED | OUTPATIENT
Start: 2023-04-14 | End: 2023-04-14

## 2023-04-14 RX ORDER — ISOSORBIDE DINITRATE 5 MG/1
1 TABLET ORAL
Refills: 0 | DISCHARGE

## 2023-04-14 RX ORDER — MAGNESIUM SULFATE 500 MG/ML
2 VIAL (ML) INJECTION ONCE
Refills: 0 | Status: COMPLETED | OUTPATIENT
Start: 2023-04-14 | End: 2023-04-14

## 2023-04-14 RX ORDER — ISOSORBIDE DINITRATE 5 MG/1
1 TABLET ORAL
Qty: 90 | Refills: 0
Start: 2023-04-14 | End: 2023-05-13

## 2023-04-14 RX ORDER — LOSARTAN POTASSIUM 100 MG/1
1 TABLET, FILM COATED ORAL
Refills: 0 | DISCHARGE

## 2023-04-14 RX ORDER — METOPROLOL TARTRATE 50 MG
0.5 TABLET ORAL
Refills: 0 | DISCHARGE

## 2023-04-14 RX ORDER — METOPROLOL TARTRATE 50 MG
12.5 TABLET ORAL EVERY 12 HOURS
Refills: 0 | Status: DISCONTINUED | OUTPATIENT
Start: 2023-04-14 | End: 2023-04-15

## 2023-04-14 RX ORDER — DILTIAZEM HCL 120 MG
1 CAPSULE, EXT RELEASE 24 HR ORAL
Refills: 0 | DISCHARGE

## 2023-04-14 RX ADMIN — Medication 25 GRAM(S): at 09:48

## 2023-04-14 RX ADMIN — ISOSORBIDE DINITRATE 10 MILLIGRAM(S): 5 TABLET ORAL at 05:08

## 2023-04-14 RX ADMIN — HEPARIN SODIUM 5000 UNIT(S): 5000 INJECTION INTRAVENOUS; SUBCUTANEOUS at 16:12

## 2023-04-14 RX ADMIN — PANTOPRAZOLE SODIUM 40 MILLIGRAM(S): 20 TABLET, DELAYED RELEASE ORAL at 06:03

## 2023-04-14 RX ADMIN — HYDROMORPHONE HYDROCHLORIDE 1 MILLIGRAM(S): 2 INJECTION INTRAMUSCULAR; INTRAVENOUS; SUBCUTANEOUS at 05:46

## 2023-04-14 RX ADMIN — Medication 1000 MILLIGRAM(S): at 14:31

## 2023-04-14 RX ADMIN — BUDESONIDE AND FORMOTEROL FUMARATE DIHYDRATE 2 PUFF(S): 160; 4.5 AEROSOL RESPIRATORY (INHALATION) at 21:04

## 2023-04-14 RX ADMIN — HEPARIN SODIUM 5000 UNIT(S): 5000 INJECTION INTRAVENOUS; SUBCUTANEOUS at 21:08

## 2023-04-14 RX ADMIN — Medication 81 MILLIGRAM(S): at 12:51

## 2023-04-14 RX ADMIN — ONDANSETRON 4 MILLIGRAM(S): 8 TABLET, FILM COATED ORAL at 18:45

## 2023-04-14 RX ADMIN — ISOSORBIDE DINITRATE 10 MILLIGRAM(S): 5 TABLET ORAL at 12:51

## 2023-04-14 RX ADMIN — OXYCODONE HYDROCHLORIDE 5 MILLIGRAM(S): 5 TABLET ORAL at 19:15

## 2023-04-14 RX ADMIN — Medication 1000 MILLIGRAM(S): at 15:01

## 2023-04-14 RX ADMIN — Medication 1000 MILLIGRAM(S): at 10:17

## 2023-04-14 RX ADMIN — Medication 1000 MILLIGRAM(S): at 09:47

## 2023-04-14 RX ADMIN — Medication 2.5 MILLIGRAM(S): at 05:18

## 2023-04-14 RX ADMIN — OXYCODONE HYDROCHLORIDE 5 MILLIGRAM(S): 5 TABLET ORAL at 18:45

## 2023-04-14 RX ADMIN — HEPARIN SODIUM 5000 UNIT(S): 5000 INJECTION INTRAVENOUS; SUBCUTANEOUS at 05:08

## 2023-04-14 RX ADMIN — Medication 12.5 MILLIGRAM(S): at 16:16

## 2023-04-14 RX ADMIN — ISOSORBIDE DINITRATE 10 MILLIGRAM(S): 5 TABLET ORAL at 16:11

## 2023-04-14 RX ADMIN — ATORVASTATIN CALCIUM 40 MILLIGRAM(S): 80 TABLET, FILM COATED ORAL at 21:08

## 2023-04-14 RX ADMIN — Medication 1000 MILLIGRAM(S): at 21:38

## 2023-04-14 RX ADMIN — HYDROMORPHONE HYDROCHLORIDE 1 MILLIGRAM(S): 2 INJECTION INTRAMUSCULAR; INTRAVENOUS; SUBCUTANEOUS at 06:16

## 2023-04-14 RX ADMIN — Medication 1000 MILLIGRAM(S): at 21:08

## 2023-04-14 RX ADMIN — ONDANSETRON 4 MILLIGRAM(S): 8 TABLET, FILM COATED ORAL at 05:46

## 2023-04-14 RX ADMIN — ONDANSETRON 4 MILLIGRAM(S): 8 TABLET, FILM COATED ORAL at 02:16

## 2023-04-14 NOTE — DISCHARGE NOTE PROVIDER - DID THE PATIENT PRESENT WITH OR WAS TREATED FOR MALNUTRITION DURING THIS ADMISSION
Yes...

## 2023-04-14 NOTE — DISCHARGE NOTE PROVIDER - DETAILS OF MALNUTRITION DIAGNOSIS/DIAGNOSES
This patient has been assessed with a concern for Malnutrition and was treated during this hospitalization for the following Nutrition diagnosis/diagnoses:     -  04/11/2023: Severe protein-calorie malnutrition   -  04/06/2023: Moderate protein-calorie malnutrition

## 2023-04-14 NOTE — DISCHARGE NOTE PROVIDER - NSDCFUADDINST_GEN_ALL_CORE_FT
WOUND CARE:  Please keep incisions clean and dry. Please do not Scrub or rub incisions. Do not use lotion or powder on incisions.   BATHING: You may shower and/or sponge bathe. You may use warm soapy water in the shower and rinse, pat dry.  ACTIVITY: No heavy lifting or straining. Otherwise, you may return to your usual level of physical activity. If you are taking narcotic pain medication DO NOT drive a car, operate machinery or make important decisions.  DIET: Return to your usual diet.  NOTIFY YOUR SURGEON IF YOU HAVE: any bleeding that does not stop, any pus draining from your wound(s), any fever (over 100.4 F) persistent nausea/vomiting, or if your pain is not controlled on your discharge pain medications, unable to urinate.  Please follow up with your primary care physician in one week regarding your hospitalization, bring copies of your discharge paperwork.  Please follow up with your surgeon, Dr. Salguero.

## 2023-04-14 NOTE — PROGRESS NOTE ADULT - ASSESSMENT
72F pmh HTN, COPD (worked at [employer], no hx tobacco use), open ccx, incisional hernia repair with mesh, presenting with abdominal discomfort of since 3/12. 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 4/12 S/p lap converted to open R colectomy for cecal mass    Plan:  - Diet: LRD  - IVF lock  - Pain control  - Activity as tolerated  - DVT PPx  - PT recommended no skilled needs  - Possible discharge in PM    A-Team  [id number]     72F pmh HTN, COPD (worked at [employer], no hx tobacco use), open ccx, incisional hernia repair with mesh, presenting with abdominal discomfort of since 3/12. 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 4/12 S/p lap converted to open R colectomy for cecal mass    Plan:  - Diet: LRD  - IVF lock  - Pain control  - Activity as tolerated  - DVT PPx  - PT recommended no skilled needs  - If H/H stable, discharge home tomorrow (dc penrose tmrw vs outpatient f/u?)    A-Team  [id number]     72F pmh HTN, COPD (worked at [employer], no hx tobacco use), open ccx, incisional hernia repair with mesh, presenting with abdominal discomfort of since 3/12. 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 4/12 S/p lap converted to open R colectomy for cecal mass    Plan:  - Diet: LRD  - IVF lock  - Pain control  - Continue with metoprolol, lipitor, isosorbide, aspirin  - Activity as tolerated  - DVT PPx  - PT recommended no skilled needs  - Appreciate cardiology recs  - If H/H stable, discharge home tomorrow (dc penrose tmrw vs outpatient f/u?)    A-Team  [id number]     72F pmh HTN, COPD (worked at [employer], no hx tobacco use), open ccx, incisional hernia repair with mesh, presenting with abdominal discomfort of since 3/12. 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 4/12 S/p lap converted to open R colectomy for cecal mass    Plan:  - Diet: LRD  - IVF lock  - Pain control  - Continue with metoprolol, lipitor, isosorbide, aspirin  - Activity as tolerated  - DVT PPx  - PT recommended no skilled needs  - Appreciate cardiology recs  - If H/H stable, discharge home tomorrow   - dc penrose tmrw vs outpatient f/u    A-Team  [id number]

## 2023-04-14 NOTE — DISCHARGE NOTE PROVIDER - NSDCFUADDAPPT_GEN_ALL_CORE_FT
New medications:  - Please take Eliquis 2.5 mg twice daily for DVT prophylaxis  - Please take Metoprolol, Isosorbide, Atorvastatin, and Aspirin as instructed.    Please follow up with cardiology 2 weeks upon discharge from hospital.  New medications:  - Please take Eliquis 2.5 mg twice daily for DVT prophylaxis  - Please take Metoprolol, Isosorbide, Atorvastatin, and Aspirin as instructed.    Please follow up with cardiology 2 weeks upon discharge from hospital for possible cardiac catheterization.

## 2023-04-14 NOTE — DISCHARGE NOTE PROVIDER - PROVIDER TOKENS
PROVIDER:[TOKEN:[[id number]],FOLLOWUP:[1 week]],PROVIDER:[TOKEN:[[id number]],FOLLOWUP:[2 weeks]]

## 2023-04-14 NOTE — DISCHARGE NOTE PROVIDER - CARE PROVIDER_API CALL
Wendy Salguero)  ColonRectal Surgery; Surgery  [address] [facility], [address]  [place] [address]  Phone: [phone]  Fax: [phone]  Follow Up Time: 1 week    Huy Epstein)  Cardiovascular Disease; Internal Medicine  [address] [place] [address], [place] [address]  Phone: [phone]  Fax: [phone]  Follow Up Time: 2 weeks

## 2023-04-14 NOTE — DISCHARGE NOTE PROVIDER - NSDCMRMEDTOKEN_GEN_ALL_CORE_FT
acetaminophen 500 mg oral tablet: 2 tab(s) orally every 6 hours  Albuterol (Eqv-ProAir HFA) 90 mcg/inh inhalation aerosol: 2 inhaled every 6 hours  aspirin 81 mg oral tablet: 1 orally once a day  aspirin 81 mg oral tablet, chewable: 1 tab(s) orally once a day  atorvastatin 40 mg oral tablet: 1 tab(s) orally once a day (at bedtime)  budesonide-formoterol 160 mcg-4.5 mcg/inh inhalation aerosol: 2 inhaled 2 times a day  Eliquis 2.5 mg oral tablet: 1 tab(s) orally 2 times a day ** DVT prophylaxis postoperatively **  isosorbide dinitrate 10 mg oral tablet: 1 orally 3 times a day  isosorbide dinitrate 10 mg oral tablet: 1 tab(s) orally 3 times a day  metoprolol tartrate 25 mg oral tablet: 0.5 tab(s) orally 2 times a day Please take HALF OF A TABLET (12.5 mg) twice daily  metoprolol tartrate 25 mg oral tablet: 0.5 orally 2 times a day  montelukast 10 mg oral tablet: 1 orally once a day  omeprazole 40 mg oral delayed release capsule: 1 orally once a day  Spiriva Respimat 1.25 mcg/inh inhalation aerosol: 2 inhaled once a day   acetaminophen 500 mg oral tablet: 2 tab(s) orally every 6 hours  Albuterol (Eqv-ProAir HFA) 90 mcg/inh inhalation aerosol: 2 inhaled every 6 hours  aspirin 81 mg oral tablet, chewable: 1 tab(s) orally once a day  atorvastatin 40 mg oral tablet: 1 tab(s) orally once a day (at bedtime)  budesonide-formoterol 160 mcg-4.5 mcg/inh inhalation aerosol: 2 inhaled 2 times a day  Eliquis 2.5 mg oral tablet: 1 tab(s) orally 2 times a day ** DVT prophylaxis postoperatively **  isosorbide dinitrate 10 mg oral tablet: 1 tab(s) orally 3 times a day  metoprolol tartrate 25 mg oral tablet: 0.5 tab(s) orally 2 times a day Please take HALF OF A TABLET (12.5 mg) twice daily  montelukast 10 mg oral tablet: 1 orally once a day  omeprazole 40 mg oral delayed release capsule: 1 orally once a day  Spiriva Respimat 1.25 mcg/inh inhalation aerosol: 2 inhaled once a day

## 2023-04-14 NOTE — DISCHARGE NOTE PROVIDER - NSDCCPTREATMENT_GEN_ALL_CORE_FT
PRINCIPAL PROCEDURE  Procedure: Laparoscopic right colectomy  Findings and Treatment: converted to open

## 2023-04-14 NOTE — DISCHARGE NOTE PROVIDER - NSDCCPCAREPLAN_GEN_ALL_CORE_FT
PRINCIPAL DISCHARGE DIAGNOSIS  Diagnosis: SBO (small bowel obstruction)  Assessment and Plan of Treatment:

## 2023-04-14 NOTE — DISCHARGE NOTE PROVIDER - HOSPITAL COURSE
72F pmh HTN, COPD (worked at [employer], no hx tobacco use), open ccx, incisional hernia repair with mesh, presenting with abdominal discomfort of since 3/12.    Patient reports diarrhea progressing to blood in bm on 3/12. Also had difficulty eating with n/v. She went to [facility] where a CT showed a mass in the cecum. she underwent colonoscopy (dr. Danny Parisi) with bx which came back as malignant per patient. She also had an abdominal wall implant that was bx but does not know the results. She was discharged from that hospital two days prior to presentation at [facility] and wanted to pursue care at [facility] to be closer to her daughter. She reports no flatus for past two days with only liquid stool. Denies weight loss. No family history of colon cancer.    Patient was admitted to colorectal surgery under the care of Dr Salguero    4/7 Cardiology was consulted for preop optimization. OSH NM Stress notable for moderate inferior ischemia and mild inferolateral ischemia, started on new medications  4/12 Patient went to the OR for laparoscopic converted to open R colectomy for cecal mass. Patient tolerated operation well and there were no post-operative complications identified. Patient remained hemodynamically stable in the PACU and transferred to the surgical floor.     Physical therapy recommended no skilled needs upon discharge. Diet was restarted and advanced as tolerated. Pain control was transitioned from IV to PO pain meds. At this time, patient is currently ambulating, voiding, tolerating a regular diet. Pain well controlled on PO pain meds. Patient has been deemed stable for discharge home with follow up as an outpatient.     Patient will be discharged with DVT prophylaxis given elevated Caprini score.

## 2023-04-14 NOTE — PROGRESS NOTE ADULT - SUBJECTIVE AND OBJECTIVE BOX
TEAM [ A ] Surgery Daily Progress Note  =====================================================    SUBJECTIVE: Patient seen and examined at bedside on AM rounds. Overnight, she passed TOV. Patient reports that they're feeling well. She is tolerating clear liquid diet with intermittent nausea, improved this morning. She is passing gas and having BM. Denies fever, chills, chest pain, SOB    ALLERGIES:  No Known Allergies      --------------------------------------------------------------------------------------    MEDICATIONS:    Neurologic Medications  acetaminophen     Tablet .. 1000 milliGRAM(s) Oral every 6 hours  ondansetron Injectable 4 milliGRAM(s) IV Push every 6 hours PRN Nausea  oxyCODONE    IR 2.5 milliGRAM(s) Oral every 4 hours PRN Moderate Pain (4 - 6)  oxyCODONE    IR 5 milliGRAM(s) Oral every 4 hours PRN Severe Pain (7 - 10)    Respiratory Medications  albuterol    90 MICROgram(s) HFA Inhaler 2 Puff(s) Inhalation every 6 hours PRN Shortness of Breath and/or Wheezing  budesonide 160 MICROgram(s)/formoterol 4.5 MICROgram(s) Inhaler 2 Puff(s) Inhalation two times a day  tiotropium 2.5 MICROgram(s) Inhaler 1 Puff(s) Inhalation daily    Cardiovascular Medications  isosorbide   dinitrate Tablet (ISORDIL) 10 milliGRAM(s) Oral three times a day  metoprolol tartrate 12.5 milliGRAM(s) Oral every 12 hours    Gastrointestinal Medications  magnesium sulfate  IVPB 2 Gram(s) IV Intermittent once  pantoprazole    Tablet 40 milliGRAM(s) Oral before breakfast    Genitourinary Medications    Hematologic/Oncologic Medications  aspirin  chewable 81 milliGRAM(s) Oral daily  heparin   Injectable 5000 Unit(s) SubCutaneous every 8 hours    Antimicrobial/Immunologic Medications    Endocrine/Metabolic Medications  atorvastatin 40 milliGRAM(s) Oral at bedtime    Topical/Other Medications  naloxone Injectable 0.1 milliGRAM(s) IV Push every 3 minutes PRN For ANY of the following changes in patient status:  A. RR LESS THAN 10 breaths per minute, B. Oxygen saturation LESS THAN 90%, C. Sedation score of 6    --------------------------------------------------------------------------------------    VITAL SIGNS:  ICU Vital Signs Last 24 Hrs  T(C): 37.1 (14 Apr 2023 06:00), Max: 37.3 (13 Apr 2023 22:15)  T(F): 98.7 (14 Apr 2023 06:00), Max: 99.2 (13 Apr 2023 22:15)  HR: 98 (14 Apr 2023 06:00) (82 - 98)  BP: 125/77 (14 Apr 2023 06:00) (116/70 - 138/86)  BP(mean): --  ABP: --  ABP(mean): --  RR: 18 (14 Apr 2023 06:00) (18 - 19)  SpO2: 99% (14 Apr 2023 06:00) (97% - 100%)    O2 Parameters below as of 14 Apr 2023 06:00  Patient On (Oxygen Delivery Method): room air  --------------------------------------------------------------------------------------    EXAM    General: NAD, resting in bed comfortably.  Cardiac: regular rate, warm and well perfused  Respiratory: Nonlabored respirations, normal cw expansion.  Abdomen: soft, nontender, nondistended, midline penrose, port sites and midline incision c/d/i  Extremities: normal strength, FROM, no deformities    --------------------------------------------------------------------------------------    LABS                        8.1    12.35 )-----------( 232      ( 14 Apr 2023 06:40 )             24.8   04-14    134<L>  |  103  |  13  ----------------------------<  94  3.7   |  24  |  0.72    Ca    8.1<L>      14 Apr 2023 06:40  Phos  2.9     04-14  Mg     1.80     04-14    --------------------------------------------------------------------------------------    INS AND OUTS:  I&O's Detail    13 Apr 2023 07:01  -  14 Apr 2023 07:00  --------------------------------------------------------  IN:    Lactated Ringers: 1200 mL    Oral Fluid: 680 mL  Total IN: 1880 mL    OUT:    Voided (mL): 1300 mL  Total OUT: 1300 mL    Total NET: 580 mL  --------------------------------------------------------------------------------------

## 2023-04-15 ENCOUNTER — TRANSCRIPTION ENCOUNTER (OUTPATIENT)
Age: 73
End: 2023-04-15

## 2023-04-15 VITALS
OXYGEN SATURATION: 94 % | HEART RATE: 76 BPM | SYSTOLIC BLOOD PRESSURE: 143 MMHG | DIASTOLIC BLOOD PRESSURE: 81 MMHG | RESPIRATION RATE: 17 BRPM | TEMPERATURE: 99 F

## 2023-04-15 LAB
ANION GAP SERPL CALC-SCNC: 9 MMOL/L — SIGNIFICANT CHANGE UP (ref 7–14)
BUN SERPL-MCNC: 9 MG/DL — SIGNIFICANT CHANGE UP (ref 7–23)
CALCIUM SERPL-MCNC: 7.8 MG/DL — LOW (ref 8.4–10.5)
CHLORIDE SERPL-SCNC: 105 MMOL/L — SIGNIFICANT CHANGE UP (ref 98–107)
CO2 SERPL-SCNC: 22 MMOL/L — SIGNIFICANT CHANGE UP (ref 22–31)
CREAT SERPL-MCNC: 0.65 MG/DL — SIGNIFICANT CHANGE UP (ref 0.5–1.3)
EGFR: 93 ML/MIN/1.73M2 — SIGNIFICANT CHANGE UP
GLUCOSE SERPL-MCNC: 92 MG/DL — SIGNIFICANT CHANGE UP (ref 70–99)
HCT VFR BLD CALC: 23.1 % — LOW (ref 34.5–45)
HCT VFR BLD CALC: 23.7 % — LOW (ref 34.5–45)
HGB BLD-MCNC: 7.5 G/DL — LOW (ref 11.5–15.5)
HGB BLD-MCNC: 7.8 G/DL — LOW (ref 11.5–15.5)
MAGNESIUM SERPL-MCNC: 1.9 MG/DL — SIGNIFICANT CHANGE UP (ref 1.6–2.6)
MCHC RBC-ENTMCNC: 27.8 PG — SIGNIFICANT CHANGE UP (ref 27–34)
MCHC RBC-ENTMCNC: 28.5 PG — SIGNIFICANT CHANGE UP (ref 27–34)
MCHC RBC-ENTMCNC: 32.5 GM/DL — SIGNIFICANT CHANGE UP (ref 32–36)
MCHC RBC-ENTMCNC: 32.9 GM/DL — SIGNIFICANT CHANGE UP (ref 32–36)
MCV RBC AUTO: 85.6 FL — SIGNIFICANT CHANGE UP (ref 80–100)
MCV RBC AUTO: 86.5 FL — SIGNIFICANT CHANGE UP (ref 80–100)
NRBC # BLD: 0 /100 WBCS — SIGNIFICANT CHANGE UP (ref 0–0)
NRBC # BLD: 0 /100 WBCS — SIGNIFICANT CHANGE UP (ref 0–0)
NRBC # FLD: 0 K/UL — SIGNIFICANT CHANGE UP (ref 0–0)
NRBC # FLD: 0 K/UL — SIGNIFICANT CHANGE UP (ref 0–0)
PHOSPHATE SERPL-MCNC: 3.4 MG/DL — SIGNIFICANT CHANGE UP (ref 2.5–4.5)
PLATELET # BLD AUTO: 225 K/UL — SIGNIFICANT CHANGE UP (ref 150–400)
PLATELET # BLD AUTO: 236 K/UL — SIGNIFICANT CHANGE UP (ref 150–400)
POTASSIUM SERPL-MCNC: 3.7 MMOL/L — SIGNIFICANT CHANGE UP (ref 3.5–5.3)
POTASSIUM SERPL-SCNC: 3.7 MMOL/L — SIGNIFICANT CHANGE UP (ref 3.5–5.3)
RBC # BLD: 2.7 M/UL — LOW (ref 3.8–5.2)
RBC # BLD: 2.74 M/UL — LOW (ref 3.8–5.2)
RBC # FLD: 13.7 % — SIGNIFICANT CHANGE UP (ref 10.3–14.5)
RBC # FLD: 13.9 % — SIGNIFICANT CHANGE UP (ref 10.3–14.5)
SODIUM SERPL-SCNC: 136 MMOL/L — SIGNIFICANT CHANGE UP (ref 135–145)
WBC # BLD: 8.1 K/UL — SIGNIFICANT CHANGE UP (ref 3.8–10.5)
WBC # BLD: 8.21 K/UL — SIGNIFICANT CHANGE UP (ref 3.8–10.5)
WBC # FLD AUTO: 8.1 K/UL — SIGNIFICANT CHANGE UP (ref 3.8–10.5)
WBC # FLD AUTO: 8.21 K/UL — SIGNIFICANT CHANGE UP (ref 3.8–10.5)

## 2023-04-15 RX ORDER — POTASSIUM CHLORIDE 20 MEQ
20 PACKET (EA) ORAL
Refills: 0 | Status: DISCONTINUED | OUTPATIENT
Start: 2023-04-15 | End: 2023-04-15

## 2023-04-15 RX ORDER — MAGNESIUM SULFATE 500 MG/ML
2 VIAL (ML) INJECTION ONCE
Refills: 0 | Status: DISCONTINUED | OUTPATIENT
Start: 2023-04-15 | End: 2023-04-15

## 2023-04-15 RX ADMIN — Medication 1000 MILLIGRAM(S): at 01:01

## 2023-04-15 RX ADMIN — Medication 12.5 MILLIGRAM(S): at 05:29

## 2023-04-15 RX ADMIN — Medication 1000 MILLIGRAM(S): at 01:31

## 2023-04-15 RX ADMIN — PANTOPRAZOLE SODIUM 40 MILLIGRAM(S): 20 TABLET, DELAYED RELEASE ORAL at 05:29

## 2023-04-15 RX ADMIN — Medication 1000 MILLIGRAM(S): at 05:59

## 2023-04-15 RX ADMIN — ISOSORBIDE DINITRATE 10 MILLIGRAM(S): 5 TABLET ORAL at 05:29

## 2023-04-15 RX ADMIN — HEPARIN SODIUM 5000 UNIT(S): 5000 INJECTION INTRAVENOUS; SUBCUTANEOUS at 05:30

## 2023-04-15 RX ADMIN — Medication 1000 MILLIGRAM(S): at 05:29

## 2023-04-15 NOTE — PROGRESS NOTE ADULT - SUBJECTIVE AND OBJECTIVE BOX
Surgery Daily Progress Note  =====================================================  SUBJECTIVE: A 72y Female Patient seen and examined at bedside on AM rounds.     ALLERGIES:  No Known Allergies      --------------------------------------------------------------------------------------    MEDICATIONS:    Neurologic Medications  acetaminophen     Tablet .. 1000 milliGRAM(s) Oral every 6 hours  ondansetron Injectable 4 milliGRAM(s) IV Push every 6 hours PRN Nausea  oxyCODONE    IR 2.5 milliGRAM(s) Oral every 4 hours PRN Moderate Pain (4 - 6)  oxyCODONE    IR 5 milliGRAM(s) Oral every 4 hours PRN Severe Pain (7 - 10)    Respiratory Medications  albuterol    90 MICROgram(s) HFA Inhaler 2 Puff(s) Inhalation every 6 hours PRN Shortness of Breath and/or Wheezing  budesonide 160 MICROgram(s)/formoterol 4.5 MICROgram(s) Inhaler 2 Puff(s) Inhalation two times a day  tiotropium 2.5 MICROgram(s) Inhaler 1 Puff(s) Inhalation daily    Cardiovascular Medications  isosorbide   dinitrate Tablet (ISORDIL) 10 milliGRAM(s) Oral three times a day  metoprolol tartrate 12.5 milliGRAM(s) Oral every 12 hours    Gastrointestinal Medications  pantoprazole    Tablet 40 milliGRAM(s) Oral before breakfast    Genitourinary Medications    Hematologic/Oncologic Medications  aspirin  chewable 81 milliGRAM(s) Oral daily  heparin   Injectable 5000 Unit(s) SubCutaneous every 8 hours    Antimicrobial/Immunologic Medications    Endocrine/Metabolic Medications  atorvastatin 40 milliGRAM(s) Oral at bedtime    Topical/Other Medications  naloxone Injectable 0.1 milliGRAM(s) IV Push every 3 minutes PRN For ANY of the following changes in patient status:  A. RR LESS THAN 10 breaths per minute, B. Oxygen saturation LESS THAN 90%, C. Sedation score of 6    --------------------------------------------------------------------------------------    VITAL SIGNS:  No Known Allergies      T(C): 36.8 (04-15-23 @ 02:00), Max: 37.1 (04-14-23 @ 10:00)  HR: 72 (04-15-23 @ 02:00) (72 - 99)  BP: 135/868 (04-15-23 @ 02:00) (122/78 - 135/868)  RR: 18 (04-15-23 @ 02:00) (17 - 18)  SpO2: 94% (04-15-23 @ 02:00) (94% - 97%)  --------------------------------------------------------------------------------------    EXAM    General: NAD, resting in bed comfortably.  Cardiac: regular rate, warm and well perfused  Respiratory: Nonlabored respirations, normal cw expansion.  Abdomen: soft, nontender, nondistended.  incision is c/d/i, penrose is in place.  Extremities: normal strength, FROM, no deformities    --------------------------------------------------------------------------------------    I&Os  T(C): 36.8 (04-15-23 @ 02:00), Max: 37.1 (04-14-23 @ 10:00)  HR: 72 (04-15-23 @ 02:00) (72 - 99)  BP: 135/868 (04-15-23 @ 02:00) (122/78 - 135/868)  RR: 18 (04-15-23 @ 02:00) (17 - 18)  SpO2: 94% (04-15-23 @ 02:00) (94% - 97%)  --------------------------------------------------------------------------------------    LABS                          8.1    12.35 )-----------( 232      ( 14 Apr 2023 06:40 )             24.8     04-14    134<L>  |  103  |  13  ----------------------------<  94  3.7   |  24  |  0.72    Ca    8.1<L>      14 Apr 2023 06:40  Phos  2.9     04-14  Mg     1.80     04-14 04-13-23 @ 07:01  -  04-14-23 @ 07:00  --------------------------------------------------------  IN: 1880 mL / OUT: 1300 mL / NET: 580 mL    04-14-23 @ 07:01  -  04-15-23 @ 06:17  --------------------------------------------------------  IN: 920 mL / OUT: 300 mL / NET: 620 mL      acetaminophen     Tablet .. 1000 milliGRAM(s) Oral every 6 hours  albuterol    90 MICROgram(s) HFA Inhaler 2 Puff(s) Inhalation every 6 hours PRN  aspirin  chewable 81 milliGRAM(s) Oral daily  atorvastatin 40 milliGRAM(s) Oral at bedtime  budesonide 160 MICROgram(s)/formoterol 4.5 MICROgram(s) Inhaler 2 Puff(s) Inhalation two times a day  heparin   Injectable 5000 Unit(s) SubCutaneous every 8 hours  isosorbide   dinitrate Tablet (ISORDIL) 10 milliGRAM(s) Oral three times a day  metoprolol tartrate 12.5 milliGRAM(s) Oral every 12 hours  naloxone Injectable 0.1 milliGRAM(s) IV Push every 3 minutes PRN  ondansetron Injectable 4 milliGRAM(s) IV Push every 6 hours PRN  oxyCODONE    IR 2.5 milliGRAM(s) Oral every 4 hours PRN  oxyCODONE    IR 5 milliGRAM(s) Oral every 4 hours PRN  pantoprazole    Tablet 40 milliGRAM(s) Oral before breakfast  tiotropium 2.5 MICROgram(s) Inhaler 1 Puff(s) Inhalation daily      RADIOLOGY, EKG & ADDITIONAL TESTS: Reviewed.

## 2023-04-15 NOTE — PROGRESS NOTE ADULT - REASON FOR ADMISSION
obstruction

## 2023-04-15 NOTE — PROGRESS NOTE ADULT - ASSESSMENT
72F pmh HTN, COPD (worked at [employer], no hx tobacco use), open ccx, incisional hernia repair with mesh, presenting with abdominal discomfort of since 3/12. 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 4/12 S/p lap converted to open R colectomy for cecal mass    Plan:  - Diet: LRD  - IVF lock  - Pain control  - Continue with metoprolol, lipitor, isosorbide, aspirin  - Activity as tolerated  - DVT PPx  - PT recommended no skilled needs  - Appreciate cardiology recs  - If H/H stable, discharge home today  - dc penrose today vs outpatient f/u    A-Team  [id number]

## 2023-04-15 NOTE — DISCHARGE NOTE NURSING/CASE MANAGEMENT/SOCIAL WORK - NSDCPNINST_GEN_ALL_CORE
Follow up with primary care physician. Monitor for signs of infection such as pain, swelling temp greater than 100.4, notify MD.

## 2023-04-15 NOTE — PROGRESS NOTE ADULT - PROVIDER SPECIALTY LIST ADULT
Colorectal Surgery
Pain Medicine
Colorectal Surgery
Surgery
Surgery
Colorectal Surgery
Surgery
Surgery

## 2023-04-15 NOTE — DISCHARGE NOTE NURSING/CASE MANAGEMENT/SOCIAL WORK - PATIENT PORTAL LINK FT
You can access the FollowMyHealth Patient Portal offered by [facility] by registering at the following website: [email or web address]. By joining [employer]’s FollowMyHealth portal, you will also be able to view your health information using other applications (apps) compatible with our system.

## 2023-04-15 NOTE — DISCHARGE NOTE NURSING/CASE MANAGEMENT/SOCIAL WORK - NSDCFUADDAPPT_GEN_ALL_CORE_FT
New medications:  - Please take Eliquis 2.5 mg twice daily for DVT prophylaxis  - Please take Metoprolol, Isosorbide, Atorvastatin, and Aspirin as instructed.    Please follow up with cardiology 2 weeks upon discharge from hospital for possible cardiac catheterization.

## 2023-04-15 NOTE — PROGRESS NOTE ADULT - NUTRITIONAL ASSESSMENT
This patient has been assessed with a concern for Malnutrition and has been determined to have a diagnosis/diagnoses of Severe protein-calorie malnutrition.    This patient is being managed with:   Diet Low Fiber-  Entered: Apr 14 2023  8:24AM  
This patient has been assessed with a concern for Malnutrition and has been determined to have a diagnosis/diagnoses of Moderate protein-calorie malnutrition.    This patient is being managed with:   Diet NPO-  Entered: Apr 5 2023  1:21AM  
This patient has been assessed with a concern for Malnutrition and has been determined to have a diagnosis/diagnoses of Moderate protein-calorie malnutrition.    This patient is being managed with:   Diet NPO-  Entered: Apr 5 2023  1:21AM  
This patient has been assessed with a concern for Malnutrition and has been determined to have a diagnosis/diagnoses of Severe protein-calorie malnutrition.    This patient is being managed with:   Diet Low Fiber-  Entered: Apr 14 2023  8:24AM  
This patient has been assessed with a concern for Malnutrition and has been determined to have a diagnosis/diagnoses of Moderate protein-calorie malnutrition.    This patient is being managed with:   Diet NPO-  Entered: Apr 5 2023  1:21AM  
This patient has been assessed with a concern for Malnutrition and has been determined to have a diagnosis/diagnoses of Severe protein-calorie malnutrition.    This patient is being managed with:   Diet NPO-  Entered: Apr 10 2023 10:38AM  
This patient has been assessed with a concern for Malnutrition and has been determined to have a diagnosis/diagnoses of Severe protein-calorie malnutrition.    This patient is being managed with:   Diet NPO-  Except Medications  Entered: Apr 11 2023 12:04PM  
This patient has been assessed with a concern for Malnutrition and has been determined to have a diagnosis/diagnoses of Moderate protein-calorie malnutrition.    This patient is being managed with:   Diet NPO-  Entered: Apr 5 2023  1:21AM  
This patient has been assessed with a concern for Malnutrition and has been determined to have a diagnosis/diagnoses of Severe protein-calorie malnutrition.    This patient is being managed with:   Diet Clear Liquid-  Entered: Apr 13 2023  7:22AM

## 2023-04-17 ENCOUNTER — NON-APPOINTMENT (OUTPATIENT)
Age: 73
End: 2023-04-17

## 2023-04-18 NOTE — CHART NOTE - NSCHARTNOTEFT_GEN_A_CORE
Post-Discharge Medication Review    Patient contacted to offer medication counseling post-discharge. Medication reconciliation completed and OMR updated. Per patient, current medications include:    1. acetaminophen 500 mg oral tablet 2 tab(s) orally every 6 hours    2. aspirin 81 mg oral tablet, chewable 1 tab(s) orally once a day     3. atorvastatin 40 mg oral tablet 1 tab(s) orally once a day (at bedtime)    4. Eliquis 2.5 mg oral tablet 1 tab(s) orally 2 times a day ** DVT prophylaxis postoperatively **     5. isosorbide dinitrate 10 mg oral tablet 1 tab(s) orally 3 times a day     6. metoprolol tartrate 25 mg oral tablet 0.5 tab(s) orally 2 times a day Please take HALF OF A TABLET (12.5 mg) twice daily   7. montelukast 10 mg oral tablet 1 orally once a day  (patient unsure if she takes this)  8. omeprazole 40 mg oral delayed release capsule 1 orally once a day       Medication name, indication, dose, administration, side effects, and monitoring reviewed for new medications post-discharge with patient. Patient demonstrated understanding. Counseling offered for all medications. Patient’s preferred pharmacy ([facility], [address], [place]) updated in OMR.    Per patient, she is not on any inhalers and her pulmonologist is aware.     As per discussion/review with surgery ACP team, she will not continue home losartan and diltiazem and to follow up with PCP/cardiologist for futher instructions. Patient made aware and understands.    Patient has follow-up appointment scheduled with provider post-discharge on 4/25/23. Encouraged patient to follow up with cardiologist and keep appointment once made.

## 2023-04-25 LAB — SURGICAL PATHOLOGY STUDY: SIGNIFICANT CHANGE UP

## 2023-04-26 RX ORDER — TIOTROPIUM BROMIDE 18 UG/1
2 CAPSULE ORAL; RESPIRATORY (INHALATION)
Refills: 0 | DISCHARGE

## 2023-04-26 RX ORDER — BUDESONIDE AND FORMOTEROL FUMARATE DIHYDRATE 160; 4.5 UG/1; UG/1
2 AEROSOL RESPIRATORY (INHALATION)
Refills: 0 | DISCHARGE

## 2023-04-26 RX ORDER — MONTELUKAST 4 MG/1
1 TABLET, CHEWABLE ORAL
Refills: 0 | DISCHARGE

## 2023-04-26 RX ORDER — ALBUTEROL 90 UG/1
2 AEROSOL, METERED ORAL
Refills: 0 | DISCHARGE

## 2023-05-02 ENCOUNTER — APPOINTMENT (OUTPATIENT)
Dept: SURGERY | Facility: CLINIC | Age: 73
End: 2023-05-02

## 2023-05-11 ENCOUNTER — APPOINTMENT (OUTPATIENT)
Dept: SURGERY | Facility: CLINIC | Age: 73
End: 2023-05-11

## 2023-05-18 ENCOUNTER — APPOINTMENT (OUTPATIENT)
Dept: SURGERY | Facility: CLINIC | Age: 73
End: 2023-05-18

## 2023-05-23 ENCOUNTER — APPOINTMENT (OUTPATIENT)
Dept: SURGERY | Facility: CLINIC | Age: 73
End: 2023-05-23

## 2023-05-25 ENCOUNTER — APPOINTMENT (OUTPATIENT)
Dept: SURGERY | Facility: CLINIC | Age: 73
End: 2023-05-25

## 2023-12-12 ENCOUNTER — EMERGENCY (EMERGENCY)
Facility: HOSPITAL | Age: 73
LOS: 1 days | Discharge: HOME | End: 2023-12-12
Attending: EMERGENCY MEDICINE | Admitting: EMERGENCY MEDICINE
Payer: MEDICARE

## 2023-12-12 VITALS
TEMPERATURE: 98 F | DIASTOLIC BLOOD PRESSURE: 68 MMHG | HEART RATE: 89 BPM | OXYGEN SATURATION: 96 % | RESPIRATION RATE: 19 BRPM | SYSTOLIC BLOOD PRESSURE: 104 MMHG

## 2023-12-12 VITALS
SYSTOLIC BLOOD PRESSURE: 124 MMHG | DIASTOLIC BLOOD PRESSURE: 78 MMHG | TEMPERATURE: 97 F | OXYGEN SATURATION: 98 % | RESPIRATION RATE: 22 BRPM | HEART RATE: 117 BPM

## 2023-12-12 LAB
ALBUMIN SERPL ELPH-MCNC: 4 G/DL — SIGNIFICANT CHANGE UP (ref 3.3–5)
ALBUMIN SERPL ELPH-MCNC: 4 G/DL — SIGNIFICANT CHANGE UP (ref 3.3–5)
ALP SERPL-CCNC: 106 U/L — SIGNIFICANT CHANGE UP (ref 40–120)
ALP SERPL-CCNC: 106 U/L — SIGNIFICANT CHANGE UP (ref 40–120)
ALT FLD-CCNC: 14 U/L — SIGNIFICANT CHANGE UP (ref 4–33)
ALT FLD-CCNC: 14 U/L — SIGNIFICANT CHANGE UP (ref 4–33)
ANION GAP SERPL CALC-SCNC: 15 MMOL/L — HIGH (ref 7–14)
ANION GAP SERPL CALC-SCNC: 15 MMOL/L — HIGH (ref 7–14)
AST SERPL-CCNC: 20 U/L — SIGNIFICANT CHANGE UP (ref 4–32)
AST SERPL-CCNC: 20 U/L — SIGNIFICANT CHANGE UP (ref 4–32)
BASOPHILS # BLD AUTO: 0.02 K/UL — SIGNIFICANT CHANGE UP (ref 0–0.2)
BASOPHILS # BLD AUTO: 0.02 K/UL — SIGNIFICANT CHANGE UP (ref 0–0.2)
BASOPHILS NFR BLD AUTO: 0.3 % — SIGNIFICANT CHANGE UP (ref 0–2)
BASOPHILS NFR BLD AUTO: 0.3 % — SIGNIFICANT CHANGE UP (ref 0–2)
BILIRUB SERPL-MCNC: 0.4 MG/DL — SIGNIFICANT CHANGE UP (ref 0.2–1.2)
BILIRUB SERPL-MCNC: 0.4 MG/DL — SIGNIFICANT CHANGE UP (ref 0.2–1.2)
BLD GP AB SCN SERPL QL: NEGATIVE — SIGNIFICANT CHANGE UP
BLD GP AB SCN SERPL QL: NEGATIVE — SIGNIFICANT CHANGE UP
BUN SERPL-MCNC: 21 MG/DL — SIGNIFICANT CHANGE UP (ref 7–23)
BUN SERPL-MCNC: 21 MG/DL — SIGNIFICANT CHANGE UP (ref 7–23)
CALCIUM SERPL-MCNC: 9.3 MG/DL — SIGNIFICANT CHANGE UP (ref 8.4–10.5)
CALCIUM SERPL-MCNC: 9.3 MG/DL — SIGNIFICANT CHANGE UP (ref 8.4–10.5)
CHLORIDE SERPL-SCNC: 103 MMOL/L — SIGNIFICANT CHANGE UP (ref 98–107)
CHLORIDE SERPL-SCNC: 103 MMOL/L — SIGNIFICANT CHANGE UP (ref 98–107)
CO2 SERPL-SCNC: 20 MMOL/L — LOW (ref 22–31)
CO2 SERPL-SCNC: 20 MMOL/L — LOW (ref 22–31)
CREAT SERPL-MCNC: 0.95 MG/DL — SIGNIFICANT CHANGE UP (ref 0.5–1.3)
CREAT SERPL-MCNC: 0.95 MG/DL — SIGNIFICANT CHANGE UP (ref 0.5–1.3)
EGFR: 63 ML/MIN/1.73M2 — SIGNIFICANT CHANGE UP
EGFR: 63 ML/MIN/1.73M2 — SIGNIFICANT CHANGE UP
EOSINOPHIL # BLD AUTO: 0.07 K/UL — SIGNIFICANT CHANGE UP (ref 0–0.5)
EOSINOPHIL # BLD AUTO: 0.07 K/UL — SIGNIFICANT CHANGE UP (ref 0–0.5)
EOSINOPHIL NFR BLD AUTO: 1 % — SIGNIFICANT CHANGE UP (ref 0–6)
EOSINOPHIL NFR BLD AUTO: 1 % — SIGNIFICANT CHANGE UP (ref 0–6)
GLUCOSE SERPL-MCNC: 89 MG/DL — SIGNIFICANT CHANGE UP (ref 70–99)
GLUCOSE SERPL-MCNC: 89 MG/DL — SIGNIFICANT CHANGE UP (ref 70–99)
HCT VFR BLD CALC: 37.2 % — SIGNIFICANT CHANGE UP (ref 34.5–45)
HCT VFR BLD CALC: 37.2 % — SIGNIFICANT CHANGE UP (ref 34.5–45)
HGB BLD-MCNC: 11.4 G/DL — LOW (ref 11.5–15.5)
HGB BLD-MCNC: 11.4 G/DL — LOW (ref 11.5–15.5)
IANC: 4.93 K/UL — SIGNIFICANT CHANGE UP (ref 1.8–7.4)
IANC: 4.93 K/UL — SIGNIFICANT CHANGE UP (ref 1.8–7.4)
IMM GRANULOCYTES NFR BLD AUTO: 0.3 % — SIGNIFICANT CHANGE UP (ref 0–0.9)
IMM GRANULOCYTES NFR BLD AUTO: 0.3 % — SIGNIFICANT CHANGE UP (ref 0–0.9)
LYMPHOCYTES # BLD AUTO: 1.41 K/UL — SIGNIFICANT CHANGE UP (ref 1–3.3)
LYMPHOCYTES # BLD AUTO: 1.41 K/UL — SIGNIFICANT CHANGE UP (ref 1–3.3)
LYMPHOCYTES # BLD AUTO: 20.3 % — SIGNIFICANT CHANGE UP (ref 13–44)
LYMPHOCYTES # BLD AUTO: 20.3 % — SIGNIFICANT CHANGE UP (ref 13–44)
MCHC RBC-ENTMCNC: 24.9 PG — LOW (ref 27–34)
MCHC RBC-ENTMCNC: 24.9 PG — LOW (ref 27–34)
MCHC RBC-ENTMCNC: 30.6 GM/DL — LOW (ref 32–36)
MCHC RBC-ENTMCNC: 30.6 GM/DL — LOW (ref 32–36)
MCV RBC AUTO: 81.2 FL — SIGNIFICANT CHANGE UP (ref 80–100)
MCV RBC AUTO: 81.2 FL — SIGNIFICANT CHANGE UP (ref 80–100)
MONOCYTES # BLD AUTO: 0.49 K/UL — SIGNIFICANT CHANGE UP (ref 0–0.9)
MONOCYTES # BLD AUTO: 0.49 K/UL — SIGNIFICANT CHANGE UP (ref 0–0.9)
MONOCYTES NFR BLD AUTO: 7.1 % — SIGNIFICANT CHANGE UP (ref 2–14)
MONOCYTES NFR BLD AUTO: 7.1 % — SIGNIFICANT CHANGE UP (ref 2–14)
NEUTROPHILS # BLD AUTO: 4.93 K/UL — SIGNIFICANT CHANGE UP (ref 1.8–7.4)
NEUTROPHILS # BLD AUTO: 4.93 K/UL — SIGNIFICANT CHANGE UP (ref 1.8–7.4)
NEUTROPHILS NFR BLD AUTO: 71 % — SIGNIFICANT CHANGE UP (ref 43–77)
NEUTROPHILS NFR BLD AUTO: 71 % — SIGNIFICANT CHANGE UP (ref 43–77)
NRBC # BLD: 0 /100 WBCS — SIGNIFICANT CHANGE UP (ref 0–0)
NRBC # BLD: 0 /100 WBCS — SIGNIFICANT CHANGE UP (ref 0–0)
NRBC # FLD: 0 K/UL — SIGNIFICANT CHANGE UP (ref 0–0)
NRBC # FLD: 0 K/UL — SIGNIFICANT CHANGE UP (ref 0–0)
PLATELET # BLD AUTO: 353 K/UL — SIGNIFICANT CHANGE UP (ref 150–400)
PLATELET # BLD AUTO: 353 K/UL — SIGNIFICANT CHANGE UP (ref 150–400)
POTASSIUM SERPL-MCNC: 4.3 MMOL/L — SIGNIFICANT CHANGE UP (ref 3.5–5.3)
POTASSIUM SERPL-MCNC: 4.3 MMOL/L — SIGNIFICANT CHANGE UP (ref 3.5–5.3)
POTASSIUM SERPL-SCNC: 4.3 MMOL/L — SIGNIFICANT CHANGE UP (ref 3.5–5.3)
POTASSIUM SERPL-SCNC: 4.3 MMOL/L — SIGNIFICANT CHANGE UP (ref 3.5–5.3)
PROT SERPL-MCNC: 7.5 G/DL — SIGNIFICANT CHANGE UP (ref 6–8.3)
PROT SERPL-MCNC: 7.5 G/DL — SIGNIFICANT CHANGE UP (ref 6–8.3)
RBC # BLD: 4.58 M/UL — SIGNIFICANT CHANGE UP (ref 3.8–5.2)
RBC # BLD: 4.58 M/UL — SIGNIFICANT CHANGE UP (ref 3.8–5.2)
RBC # FLD: 16.2 % — HIGH (ref 10.3–14.5)
RBC # FLD: 16.2 % — HIGH (ref 10.3–14.5)
RH IG SCN BLD-IMP: POSITIVE — SIGNIFICANT CHANGE UP
RH IG SCN BLD-IMP: POSITIVE — SIGNIFICANT CHANGE UP
SODIUM SERPL-SCNC: 138 MMOL/L — SIGNIFICANT CHANGE UP (ref 135–145)
SODIUM SERPL-SCNC: 138 MMOL/L — SIGNIFICANT CHANGE UP (ref 135–145)
WBC # BLD: 6.94 K/UL — SIGNIFICANT CHANGE UP (ref 3.8–10.5)
WBC # BLD: 6.94 K/UL — SIGNIFICANT CHANGE UP (ref 3.8–10.5)
WBC # FLD AUTO: 6.94 K/UL — SIGNIFICANT CHANGE UP (ref 3.8–10.5)
WBC # FLD AUTO: 6.94 K/UL — SIGNIFICANT CHANGE UP (ref 3.8–10.5)

## 2023-12-12 PROCEDURE — 76830 TRANSVAGINAL US NON-OB: CPT | Mod: 26

## 2023-12-12 PROCEDURE — 99284 EMERGENCY DEPT VISIT MOD MDM: CPT

## 2023-12-12 NOTE — ED ADULT TRIAGE NOTE - CHIEF COMPLAINT QUOTE
pt with Hx. ABD Sx for Hernia with mesh done 4/2023, on Friday vag. with ABD pain, denies CP, no dizziness, pt Hr. 118 in triage.

## 2023-12-12 NOTE — ED PROVIDER NOTE - CLINICAL SUMMARY MEDICAL DECISION MAKING FREE TEXT BOX
73-year-old female history of hypertension, COPD, hernia repair with mesh, right hemicolectomy, uterine fibroids presenting with vaginal bleeding for 3 days.  Patient endorsing dark vaginal bleeding with some clots.  Endorsing up to 5 pads per day.  Endorsing abdominal cramping with episodes worsened on day 1 which have now subsided.    Vital signs remarkable for slight tachycardia, afebrile, normotensive. plan for basic labs, type and screen, TVUS  Differential diagnosis includes but not limited to anemia vs. uterine fibroids vs. infectious etiology.

## 2023-12-12 NOTE — ED PROVIDER NOTE - PHYSICAL EXAMINATION
Gus Aviles DO (PGY2)   Physical Exam:    Gen: NAD, AOx3  Head: NCAT  HEENT: EOMI, PEERLA  Lung: CTAB, no respiratory distress, no wheezes/rhonchi/rales B/L  CV: RRR, no murmurs, rubs or gallops  Abd: soft, NT, ND, no guarding, no rigidity, no rebound tenderness, no CVA tenderness   MSK: no visible deformities, ROM normal in UE/LE, no back pain  Neuro: No focal sensory or motor deficits. Sensation intact to light touch all extremities.  Skin: Warm, well perfused, no rash, no leg swelling  Psych: normal affect, calm

## 2023-12-12 NOTE — ED PROVIDER NOTE - ATTENDING CONTRIBUTION TO CARE
Pt was seen and evaluated by me. Pt is a 72 y/o female with PMHx of HTN, COPD, hernia s/p repair, right hemicolectomy, and uterine fibroids who presented to the ED for vaginal bleeding X 3 days. Pt states over the past 3 days having some vaginal bleeding with pelvic discomfort. Pt denies any fever, chills, nausea, vomiting, SOB, chest pain, or dysuria.   VITALS: Vitals have been reviewed.  GEN APPEARANCE: Alert and cooperative, non-toxic appearing and in NAD  HEAD: Atraumatic, normocephalic.   EYES: PERRL, EOMI.   EARS: Gross hearing intact.   NOSE: No nasal discharge.   THROAT: MMM. Oral cavity and pharynx normal.   CV: RRR, S1S2, no c/r/m/g. No cyanosis or pallor. Extremities warm, well perfused. Cap refill <2 seconds. No bruits.   LUNGS: CTAB. No wheezing. No rales.   ABDOMEN: Soft, NTND. No guarding or rebound.   MSK/EXT: Spine appears normal, no spine point tenderness.   NEURO: Alert, follows commands. Speech normal. Sensation and motor normal x4 extremities.   SKIN: Normal color for race, warm, dry and intact. No evidence of rash.  72 y/o female with PMHx of HTN, COPD, hernia s/p repair, right hemicolectomy, and uterine fibroids who presented to the ED for vaginal bleeding X 3 days.  Concern for Fibroids/UTI/Vaginal bleeding  Labs, US, UA Pt was seen and evaluated by me. Pt is a 74 y/o female with PMHx of HTN, COPD, hernia s/p repair, right hemicolectomy, and uterine fibroids who presented to the ED for vaginal bleeding X 3 days. Pt states over the past 3 days having some vaginal bleeding with pelvic discomfort. Pt denies any fever, chills, nausea, vomiting, SOB, chest pain, or dysuria.   VITALS: Vitals have been reviewed.  GEN APPEARANCE: Alert and cooperative, non-toxic appearing and in NAD  HEAD: Atraumatic, normocephalic.   EYES: PERRL, EOMI.   EARS: Gross hearing intact.   NOSE: No nasal discharge.   THROAT: MMM. Oral cavity and pharynx normal.   CV: RRR, S1S2, no c/r/m/g. No cyanosis or pallor. Extremities warm, well perfused. Cap refill <2 seconds. No bruits.   LUNGS: CTAB. No wheezing. No rales.   ABDOMEN: Soft, NTND. No guarding or rebound.   MSK/EXT: Spine appears normal, no spine point tenderness.   NEURO: Alert, follows commands. Speech normal. Sensation and motor normal x4 extremities.   SKIN: Normal color for race, warm, dry and intact. No evidence of rash.  74 y/o female with PMHx of HTN, COPD, hernia s/p repair, right hemicolectomy, and uterine fibroids who presented to the ED for vaginal bleeding X 3 days.  Concern for Fibroids/UTI/Vaginal bleeding  Labs, US, UA

## 2023-12-12 NOTE — ED PROVIDER NOTE - OBJECTIVE STATEMENT
73-year-old female history of hypertension, COPD, hernia repair with mesh, right hemicolectomy, uterine fibroids presenting with vaginal bleeding for 3 days.  Patient endorsing dark vaginal bleeding with some clots.  Endorsing up to 5 pads per day.  Endorsing abdominal cramping with episodes worsened on day 1 which have now subsided.  Denies any vomiting, diarrhea, constipation, rectal bleeding, fever, chills, chest pain, shortness of breath.  States she has not followed up with GYN in a while. 73-year-old female history of hypertension, COPD, hernia repair with mesh, right hemicolectomy, uterine fibroids presenting with vaginal bleeding for 3 days.  Patient endorsing dark vaginal bleeding with some clots.  Endorsing up to 5 pads per day.  Endorsing abdominal cramping with episodes worsened on day 1 which have now subsided.  Denies any vomiting, diarrhea, constipation, rectal bleeding, fever, chills, chest pain, shortness of breath, dysuria.  States she has not followed up with GYN in a while.

## 2023-12-12 NOTE — ED PROVIDER NOTE - PROGRESS NOTE DETAILS
Resident: Gus Aviles, PGY2 – Pt was re-evaluated at bedside, VSS, feeling better overall. Results were discussed with patient as well as return precautions and follow up plan with PCP and/or specialist. Time was taken to answer any questions that the patient had before providing them with discharge paperwork. Will given GYN f/u

## 2023-12-12 NOTE — ED ADULT NURSE NOTE - OBJECTIVE STATEMENT
Patient came in with the complaints of vaginal bleeding. As per pat Patient came in with the complaints of vaginal bleeding. As per patient she have h/o uterine fibroids, hernia repair and colon surgery. Patient stated "she has been bleeding all her life but this weekend it becomes more heavy and darker. No other complaints. No distress noted. specimens collected and sent. Nursing care continues

## 2023-12-12 NOTE — ED PROVIDER NOTE - NSFOLLOWUPINSTRUCTIONS_ED_ALL_ED_FT
- You were seen in the emergency department today for vaginal bleeding    - Your bloodwork should a stable hemoglobin. Your ultrasound showed a uterus with many fibroids    -You will be given a GYN referral - please follow up with them.     - Lab and imaging results, if performed, were discussed with you along with your discharge diagnosis    - Follow up with your doctor in 1 week - bring copies of your results if you were given. If you are supposed  to follow up with a specialist, please bring your results with you as well.     - Return to the ED for any new, worsening, or concerning symptoms to you    - Continue all prescribed medications.    - Take ibuprofen/tylenol as directed as needed for pain.     - Rest and keep yourself hydrated with fluids.

## 2023-12-12 NOTE — ED ADULT NURSE NOTE - NSFALLUNIVINTERV_ED_ALL_ED
Bed/Stretcher in lowest position, wheels locked, appropriate side rails in place/Call bell, personal items and telephone in reach/Instruct patient to call for assistance before getting out of bed/chair/stretcher/Non-slip footwear applied when patient is off stretcher/[place] to call system/Physically safe environment - no spills, clutter or unnecessary equipment/Purposeful proactive rounding/Room/bathroom lighting operational, light cord in reach Bed/Stretcher in lowest position, wheels locked, appropriate side rails in place/Call bell, personal items and telephone in reach/Instruct patient to call for assistance before getting out of bed/chair/stretcher/Non-slip footwear applied when patient is off stretcher/[place] to call system/Physically safe environment - no spills, clutter or unnecessary equipment/Purposeful proactive rounding/Room/bathroom lighting operational, light cord in reach

## 2023-12-12 NOTE — ED PROVIDER NOTE - PATIENT PORTAL LINK FT
You can access the FollowMyHealth Patient Portal offered by [facility] by registering at the following website: [email or web address]. By joining [facility]’s FollowMyHealth portal, you will also be able to view your health information using other applications (apps) compatible with our system. You can access the FollowMyHealth Patient Portal offered by [facility] by registering at the following website: [email or web address]. By joining [employer]’s FollowMyHealth portal, you will also be able to view your health information using other applications (apps) compatible with our system.

## 2023-12-12 NOTE — ED PROVIDER NOTE - RESPIRATORY, MLM
[Formula ___ oz/feed] : [unfilled] oz of formula per feed [Hours between feeds ___] : Child is fed every [unfilled] hours [Normal] : Normal [___ voids per day] : [unfilled] voids per day [Frequency of stools: ___] : Frequency of stools: [unfilled]  stools [per day] : per day. [Dark green] : dark green [Firm] : firm consistency [In Bassinet/Crib] : sleeps in bassinet/crib [On back] : sleeps on back [Pacifier] : Uses pacifier [No] : Household members not COVID-19 positive or suspected COVID-19 [Water heater temperature set at <120 degrees F] : Water heater temperature set at <120 degrees F [Rear facing car seat in back seat] : Rear facing car seat in back seat [Carbon Monoxide Detectors] : Carbon monoxide detectors at home [Smoke Detectors] : Smoke detectors at home. [Hepatitis B Vaccine Given] : Hepatitis B vaccine given [Vitamins ___] : Patient takes no vitamins [Co-sleeping] : no co-sleeping [Loose bedding, pillow, toys, and/or bumpers in crib] : no loose bedding, pillow, toys, and/or bumpers in crib [Exposure to electronic nicotine delivery system] : No exposure to electronic nicotine delivery system [Gun in Home] : No gun in home Breath sounds clear and equal bilaterally.

## 2023-12-21 ENCOUNTER — APPOINTMENT (OUTPATIENT)
Dept: OBGYN | Facility: CLINIC | Age: 73
End: 2023-12-21

## 2024-01-05 ENCOUNTER — INPATIENT (INPATIENT)
Facility: HOSPITAL | Age: 74
LOS: 12 days | Discharge: LEFT BEFORE DISCHARGE | End: 2024-01-18
Attending: STUDENT IN AN ORGANIZED HEALTH CARE EDUCATION/TRAINING PROGRAM | Admitting: STUDENT IN AN ORGANIZED HEALTH CARE EDUCATION/TRAINING PROGRAM
Payer: MEDICARE

## 2024-01-05 VITALS
RESPIRATION RATE: 18 BRPM | TEMPERATURE: 98 F | SYSTOLIC BLOOD PRESSURE: 96 MMHG | OXYGEN SATURATION: 99 % | DIASTOLIC BLOOD PRESSURE: 76 MMHG | HEART RATE: 95 BPM

## 2024-01-05 DIAGNOSIS — K56.609 UNSPECIFIED INTESTINAL OBSTRUCTION, UNSPECIFIED AS TO PARTIAL VERSUS COMPLETE OBSTRUCTION: ICD-10-CM

## 2024-01-05 LAB
ALBUMIN SERPL ELPH-MCNC: 4 G/DL — SIGNIFICANT CHANGE UP (ref 3.3–5)
ALBUMIN SERPL ELPH-MCNC: 4 G/DL — SIGNIFICANT CHANGE UP (ref 3.3–5)
ALP SERPL-CCNC: 96 U/L — SIGNIFICANT CHANGE UP (ref 40–120)
ALP SERPL-CCNC: 96 U/L — SIGNIFICANT CHANGE UP (ref 40–120)
ALT FLD-CCNC: 14 U/L — SIGNIFICANT CHANGE UP (ref 4–33)
ALT FLD-CCNC: 14 U/L — SIGNIFICANT CHANGE UP (ref 4–33)
ANION GAP SERPL CALC-SCNC: 15 MMOL/L — HIGH (ref 7–14)
ANION GAP SERPL CALC-SCNC: 15 MMOL/L — HIGH (ref 7–14)
ANION GAP SERPL CALC-SCNC: 20 MMOL/L — HIGH (ref 7–14)
ANION GAP SERPL CALC-SCNC: 20 MMOL/L — HIGH (ref 7–14)
ANISOCYTOSIS BLD QL: SLIGHT — SIGNIFICANT CHANGE UP
ANISOCYTOSIS BLD QL: SLIGHT — SIGNIFICANT CHANGE UP
APPEARANCE UR: ABNORMAL
APPEARANCE UR: ABNORMAL
APTT BLD: 28.1 SEC — SIGNIFICANT CHANGE UP (ref 24.5–35.6)
APTT BLD: 28.1 SEC — SIGNIFICANT CHANGE UP (ref 24.5–35.6)
AST SERPL-CCNC: 14 U/L — SIGNIFICANT CHANGE UP (ref 4–32)
AST SERPL-CCNC: 14 U/L — SIGNIFICANT CHANGE UP (ref 4–32)
B PERT DNA SPEC QL NAA+PROBE: SIGNIFICANT CHANGE UP
B PERT DNA SPEC QL NAA+PROBE: SIGNIFICANT CHANGE UP
B PERT+PARAPERT DNA PNL SPEC NAA+PROBE: SIGNIFICANT CHANGE UP
B PERT+PARAPERT DNA PNL SPEC NAA+PROBE: SIGNIFICANT CHANGE UP
BACTERIA # UR AUTO: NEGATIVE /HPF — SIGNIFICANT CHANGE UP
BACTERIA # UR AUTO: NEGATIVE /HPF — SIGNIFICANT CHANGE UP
BASE EXCESS BLDV CALC-SCNC: -1 MMOL/L — SIGNIFICANT CHANGE UP (ref -2–3)
BASE EXCESS BLDV CALC-SCNC: -1 MMOL/L — SIGNIFICANT CHANGE UP (ref -2–3)
BASOPHILS # BLD AUTO: 0.09 K/UL — SIGNIFICANT CHANGE UP (ref 0–0.2)
BASOPHILS # BLD AUTO: 0.09 K/UL — SIGNIFICANT CHANGE UP (ref 0–0.2)
BASOPHILS NFR BLD AUTO: 0.9 % — SIGNIFICANT CHANGE UP (ref 0–2)
BASOPHILS NFR BLD AUTO: 0.9 % — SIGNIFICANT CHANGE UP (ref 0–2)
BILIRUB SERPL-MCNC: 0.8 MG/DL — SIGNIFICANT CHANGE UP (ref 0.2–1.2)
BILIRUB SERPL-MCNC: 0.8 MG/DL — SIGNIFICANT CHANGE UP (ref 0.2–1.2)
BILIRUB UR-MCNC: NEGATIVE — SIGNIFICANT CHANGE UP
BILIRUB UR-MCNC: NEGATIVE — SIGNIFICANT CHANGE UP
BLD GP AB SCN SERPL QL: NEGATIVE — SIGNIFICANT CHANGE UP
BLD GP AB SCN SERPL QL: NEGATIVE — SIGNIFICANT CHANGE UP
BLOOD GAS VENOUS COMPREHENSIVE RESULT: SIGNIFICANT CHANGE UP
BLOOD GAS VENOUS COMPREHENSIVE RESULT: SIGNIFICANT CHANGE UP
BORDETELLA PARAPERTUSSIS (RAPRVP): SIGNIFICANT CHANGE UP
BORDETELLA PARAPERTUSSIS (RAPRVP): SIGNIFICANT CHANGE UP
BUN SERPL-MCNC: 53 MG/DL — HIGH (ref 7–23)
BUN SERPL-MCNC: 53 MG/DL — HIGH (ref 7–23)
BUN SERPL-MCNC: 68 MG/DL — HIGH (ref 7–23)
BUN SERPL-MCNC: 68 MG/DL — HIGH (ref 7–23)
C PNEUM DNA SPEC QL NAA+PROBE: SIGNIFICANT CHANGE UP
C PNEUM DNA SPEC QL NAA+PROBE: SIGNIFICANT CHANGE UP
CALCIUM SERPL-MCNC: 8.3 MG/DL — LOW (ref 8.4–10.5)
CALCIUM SERPL-MCNC: 8.3 MG/DL — LOW (ref 8.4–10.5)
CALCIUM SERPL-MCNC: 9.9 MG/DL — SIGNIFICANT CHANGE UP (ref 8.4–10.5)
CALCIUM SERPL-MCNC: 9.9 MG/DL — SIGNIFICANT CHANGE UP (ref 8.4–10.5)
CAST: 22 /LPF — HIGH (ref 0–4)
CAST: 22 /LPF — HIGH (ref 0–4)
CHLORIDE BLDV-SCNC: 94 MMOL/L — LOW (ref 96–108)
CHLORIDE BLDV-SCNC: 94 MMOL/L — LOW (ref 96–108)
CHLORIDE SERPL-SCNC: 89 MMOL/L — LOW (ref 98–107)
CHLORIDE SERPL-SCNC: 89 MMOL/L — LOW (ref 98–107)
CHLORIDE SERPL-SCNC: 96 MMOL/L — LOW (ref 98–107)
CHLORIDE SERPL-SCNC: 96 MMOL/L — LOW (ref 98–107)
CO2 BLDV-SCNC: 23.2 MMOL/L — SIGNIFICANT CHANGE UP (ref 22–26)
CO2 BLDV-SCNC: 23.2 MMOL/L — SIGNIFICANT CHANGE UP (ref 22–26)
CO2 SERPL-SCNC: 19 MMOL/L — LOW (ref 22–31)
CO2 SERPL-SCNC: 19 MMOL/L — LOW (ref 22–31)
CO2 SERPL-SCNC: 21 MMOL/L — LOW (ref 22–31)
CO2 SERPL-SCNC: 21 MMOL/L — LOW (ref 22–31)
COLOR SPEC: YELLOW — SIGNIFICANT CHANGE UP
COLOR SPEC: YELLOW — SIGNIFICANT CHANGE UP
CREAT SERPL-MCNC: 1.63 MG/DL — HIGH (ref 0.5–1.3)
CREAT SERPL-MCNC: 1.63 MG/DL — HIGH (ref 0.5–1.3)
CREAT SERPL-MCNC: 2.31 MG/DL — HIGH (ref 0.5–1.3)
CREAT SERPL-MCNC: 2.31 MG/DL — HIGH (ref 0.5–1.3)
DACRYOCYTES BLD QL SMEAR: SLIGHT — SIGNIFICANT CHANGE UP
DACRYOCYTES BLD QL SMEAR: SLIGHT — SIGNIFICANT CHANGE UP
DIFF PNL FLD: ABNORMAL
DIFF PNL FLD: ABNORMAL
EGFR: 22 ML/MIN/1.73M2 — LOW
EGFR: 22 ML/MIN/1.73M2 — LOW
EGFR: 33 ML/MIN/1.73M2 — LOW
EGFR: 33 ML/MIN/1.73M2 — LOW
EOSINOPHIL # BLD AUTO: 0.28 K/UL — SIGNIFICANT CHANGE UP (ref 0–0.5)
EOSINOPHIL # BLD AUTO: 0.28 K/UL — SIGNIFICANT CHANGE UP (ref 0–0.5)
EOSINOPHIL NFR BLD AUTO: 2.7 % — SIGNIFICANT CHANGE UP (ref 0–6)
EOSINOPHIL NFR BLD AUTO: 2.7 % — SIGNIFICANT CHANGE UP (ref 0–6)
FLUAV SUBTYP SPEC NAA+PROBE: SIGNIFICANT CHANGE UP
FLUAV SUBTYP SPEC NAA+PROBE: SIGNIFICANT CHANGE UP
FLUBV RNA SPEC QL NAA+PROBE: SIGNIFICANT CHANGE UP
FLUBV RNA SPEC QL NAA+PROBE: SIGNIFICANT CHANGE UP
GAS PNL BLDV: 128 MMOL/L — LOW (ref 136–145)
GAS PNL BLDV: 128 MMOL/L — LOW (ref 136–145)
GAS PNL BLDV: SIGNIFICANT CHANGE UP
GAS PNL BLDV: SIGNIFICANT CHANGE UP
GLUCOSE BLDC GLUCOMTR-MCNC: 99 MG/DL — SIGNIFICANT CHANGE UP (ref 70–99)
GLUCOSE BLDC GLUCOMTR-MCNC: 99 MG/DL — SIGNIFICANT CHANGE UP (ref 70–99)
GLUCOSE BLDV-MCNC: 110 MG/DL — HIGH (ref 70–99)
GLUCOSE BLDV-MCNC: 110 MG/DL — HIGH (ref 70–99)
GLUCOSE SERPL-MCNC: 93 MG/DL — SIGNIFICANT CHANGE UP (ref 70–99)
GLUCOSE SERPL-MCNC: 93 MG/DL — SIGNIFICANT CHANGE UP (ref 70–99)
GLUCOSE SERPL-MCNC: 99 MG/DL — SIGNIFICANT CHANGE UP (ref 70–99)
GLUCOSE SERPL-MCNC: 99 MG/DL — SIGNIFICANT CHANGE UP (ref 70–99)
GLUCOSE UR QL: NEGATIVE MG/DL — SIGNIFICANT CHANGE UP
GLUCOSE UR QL: NEGATIVE MG/DL — SIGNIFICANT CHANGE UP
HADV DNA SPEC QL NAA+PROBE: SIGNIFICANT CHANGE UP
HADV DNA SPEC QL NAA+PROBE: SIGNIFICANT CHANGE UP
HCO3 BLDV-SCNC: 22 MMOL/L — SIGNIFICANT CHANGE UP (ref 22–29)
HCO3 BLDV-SCNC: 22 MMOL/L — SIGNIFICANT CHANGE UP (ref 22–29)
HCOV 229E RNA SPEC QL NAA+PROBE: SIGNIFICANT CHANGE UP
HCOV 229E RNA SPEC QL NAA+PROBE: SIGNIFICANT CHANGE UP
HCOV HKU1 RNA SPEC QL NAA+PROBE: SIGNIFICANT CHANGE UP
HCOV HKU1 RNA SPEC QL NAA+PROBE: SIGNIFICANT CHANGE UP
HCOV NL63 RNA SPEC QL NAA+PROBE: SIGNIFICANT CHANGE UP
HCOV NL63 RNA SPEC QL NAA+PROBE: SIGNIFICANT CHANGE UP
HCOV OC43 RNA SPEC QL NAA+PROBE: SIGNIFICANT CHANGE UP
HCOV OC43 RNA SPEC QL NAA+PROBE: SIGNIFICANT CHANGE UP
HCT VFR BLD CALC: 37.8 % — SIGNIFICANT CHANGE UP (ref 34.5–45)
HCT VFR BLD CALC: 37.8 % — SIGNIFICANT CHANGE UP (ref 34.5–45)
HCT VFR BLDA CALC: 40 % — SIGNIFICANT CHANGE UP (ref 34.5–46.5)
HCT VFR BLDA CALC: 40 % — SIGNIFICANT CHANGE UP (ref 34.5–46.5)
HGB BLD CALC-MCNC: 13.2 G/DL — SIGNIFICANT CHANGE UP (ref 11.7–16.1)
HGB BLD CALC-MCNC: 13.2 G/DL — SIGNIFICANT CHANGE UP (ref 11.7–16.1)
HGB BLD-MCNC: 12.6 G/DL — SIGNIFICANT CHANGE UP (ref 11.5–15.5)
HGB BLD-MCNC: 12.6 G/DL — SIGNIFICANT CHANGE UP (ref 11.5–15.5)
HMPV RNA SPEC QL NAA+PROBE: SIGNIFICANT CHANGE UP
HMPV RNA SPEC QL NAA+PROBE: SIGNIFICANT CHANGE UP
HPIV1 RNA SPEC QL NAA+PROBE: SIGNIFICANT CHANGE UP
HPIV1 RNA SPEC QL NAA+PROBE: SIGNIFICANT CHANGE UP
HPIV2 RNA SPEC QL NAA+PROBE: SIGNIFICANT CHANGE UP
HPIV2 RNA SPEC QL NAA+PROBE: SIGNIFICANT CHANGE UP
HPIV3 RNA SPEC QL NAA+PROBE: SIGNIFICANT CHANGE UP
HPIV3 RNA SPEC QL NAA+PROBE: SIGNIFICANT CHANGE UP
HPIV4 RNA SPEC QL NAA+PROBE: SIGNIFICANT CHANGE UP
HPIV4 RNA SPEC QL NAA+PROBE: SIGNIFICANT CHANGE UP
HYALINE CASTS # UR AUTO: PRESENT
HYALINE CASTS # UR AUTO: PRESENT
IANC: 7.68 K/UL — HIGH (ref 1.8–7.4)
IANC: 7.68 K/UL — HIGH (ref 1.8–7.4)
INR BLD: 1.05 RATIO — SIGNIFICANT CHANGE UP (ref 0.85–1.18)
INR BLD: 1.05 RATIO — SIGNIFICANT CHANGE UP (ref 0.85–1.18)
KETONES UR-MCNC: 15 MG/DL
KETONES UR-MCNC: 15 MG/DL
LACTATE BLDV-MCNC: 1.8 MMOL/L — SIGNIFICANT CHANGE UP (ref 0.5–2)
LACTATE BLDV-MCNC: 1.8 MMOL/L — SIGNIFICANT CHANGE UP (ref 0.5–2)
LEUKOCYTE ESTERASE UR-ACNC: ABNORMAL
LEUKOCYTE ESTERASE UR-ACNC: ABNORMAL
LYMPHOCYTES # BLD AUTO: 19.6 % — SIGNIFICANT CHANGE UP (ref 13–44)
LYMPHOCYTES # BLD AUTO: 19.6 % — SIGNIFICANT CHANGE UP (ref 13–44)
LYMPHOCYTES # BLD AUTO: 2.03 K/UL — SIGNIFICANT CHANGE UP (ref 1–3.3)
LYMPHOCYTES # BLD AUTO: 2.03 K/UL — SIGNIFICANT CHANGE UP (ref 1–3.3)
M PNEUMO DNA SPEC QL NAA+PROBE: SIGNIFICANT CHANGE UP
M PNEUMO DNA SPEC QL NAA+PROBE: SIGNIFICANT CHANGE UP
MAGNESIUM SERPL-MCNC: 2.1 MG/DL — SIGNIFICANT CHANGE UP (ref 1.6–2.6)
MAGNESIUM SERPL-MCNC: 2.1 MG/DL — SIGNIFICANT CHANGE UP (ref 1.6–2.6)
MANUAL SMEAR VERIFICATION: SIGNIFICANT CHANGE UP
MANUAL SMEAR VERIFICATION: SIGNIFICANT CHANGE UP
MCHC RBC-ENTMCNC: 24.9 PG — LOW (ref 27–34)
MCHC RBC-ENTMCNC: 24.9 PG — LOW (ref 27–34)
MCHC RBC-ENTMCNC: 33.3 GM/DL — SIGNIFICANT CHANGE UP (ref 32–36)
MCHC RBC-ENTMCNC: 33.3 GM/DL — SIGNIFICANT CHANGE UP (ref 32–36)
MCV RBC AUTO: 74.7 FL — LOW (ref 80–100)
MCV RBC AUTO: 74.7 FL — LOW (ref 80–100)
MICROCYTES BLD QL: SLIGHT — SIGNIFICANT CHANGE UP
MICROCYTES BLD QL: SLIGHT — SIGNIFICANT CHANGE UP
MONOCYTES # BLD AUTO: 0.92 K/UL — HIGH (ref 0–0.9)
MONOCYTES # BLD AUTO: 0.92 K/UL — HIGH (ref 0–0.9)
MONOCYTES NFR BLD AUTO: 8.9 % — SIGNIFICANT CHANGE UP (ref 2–14)
MONOCYTES NFR BLD AUTO: 8.9 % — SIGNIFICANT CHANGE UP (ref 2–14)
NEUTROPHILS # BLD AUTO: 6.75 K/UL — SIGNIFICANT CHANGE UP (ref 1.8–7.4)
NEUTROPHILS # BLD AUTO: 6.75 K/UL — SIGNIFICANT CHANGE UP (ref 1.8–7.4)
NEUTROPHILS NFR BLD AUTO: 65.2 % — SIGNIFICANT CHANGE UP (ref 43–77)
NEUTROPHILS NFR BLD AUTO: 65.2 % — SIGNIFICANT CHANGE UP (ref 43–77)
NITRITE UR-MCNC: NEGATIVE — SIGNIFICANT CHANGE UP
NITRITE UR-MCNC: NEGATIVE — SIGNIFICANT CHANGE UP
NT-PROBNP SERPL-SCNC: 61 PG/ML — SIGNIFICANT CHANGE UP
NT-PROBNP SERPL-SCNC: 61 PG/ML — SIGNIFICANT CHANGE UP
OVALOCYTES BLD QL SMEAR: SLIGHT — SIGNIFICANT CHANGE UP
OVALOCYTES BLD QL SMEAR: SLIGHT — SIGNIFICANT CHANGE UP
PCO2 BLDV: 32 MMHG — LOW (ref 39–52)
PCO2 BLDV: 32 MMHG — LOW (ref 39–52)
PH BLDV: 7.45 — HIGH (ref 7.32–7.43)
PH BLDV: 7.45 — HIGH (ref 7.32–7.43)
PH UR: 5 — SIGNIFICANT CHANGE UP (ref 5–8)
PH UR: 5 — SIGNIFICANT CHANGE UP (ref 5–8)
PHOSPHATE SERPL-MCNC: 4.2 MG/DL — SIGNIFICANT CHANGE UP (ref 2.5–4.5)
PHOSPHATE SERPL-MCNC: 4.2 MG/DL — SIGNIFICANT CHANGE UP (ref 2.5–4.5)
PLAT MORPH BLD: NORMAL — SIGNIFICANT CHANGE UP
PLAT MORPH BLD: NORMAL — SIGNIFICANT CHANGE UP
PLATELET # BLD AUTO: 357 K/UL — SIGNIFICANT CHANGE UP (ref 150–400)
PLATELET # BLD AUTO: 357 K/UL — SIGNIFICANT CHANGE UP (ref 150–400)
PLATELET COUNT - ESTIMATE: NORMAL — SIGNIFICANT CHANGE UP
PLATELET COUNT - ESTIMATE: NORMAL — SIGNIFICANT CHANGE UP
PO2 BLDV: 24 MMHG — LOW (ref 25–45)
PO2 BLDV: 24 MMHG — LOW (ref 25–45)
POIKILOCYTOSIS BLD QL AUTO: SLIGHT — SIGNIFICANT CHANGE UP
POIKILOCYTOSIS BLD QL AUTO: SLIGHT — SIGNIFICANT CHANGE UP
POLYCHROMASIA BLD QL SMEAR: SLIGHT — SIGNIFICANT CHANGE UP
POLYCHROMASIA BLD QL SMEAR: SLIGHT — SIGNIFICANT CHANGE UP
POTASSIUM BLDV-SCNC: 3.2 MMOL/L — LOW (ref 3.5–5.1)
POTASSIUM BLDV-SCNC: 3.2 MMOL/L — LOW (ref 3.5–5.1)
POTASSIUM SERPL-MCNC: 3.3 MMOL/L — LOW (ref 3.5–5.3)
POTASSIUM SERPL-MCNC: 3.3 MMOL/L — LOW (ref 3.5–5.3)
POTASSIUM SERPL-MCNC: 3.4 MMOL/L — LOW (ref 3.5–5.3)
POTASSIUM SERPL-MCNC: 3.4 MMOL/L — LOW (ref 3.5–5.3)
POTASSIUM SERPL-SCNC: 3.3 MMOL/L — LOW (ref 3.5–5.3)
POTASSIUM SERPL-SCNC: 3.3 MMOL/L — LOW (ref 3.5–5.3)
POTASSIUM SERPL-SCNC: 3.4 MMOL/L — LOW (ref 3.5–5.3)
POTASSIUM SERPL-SCNC: 3.4 MMOL/L — LOW (ref 3.5–5.3)
PROT SERPL-MCNC: 7.8 G/DL — SIGNIFICANT CHANGE UP (ref 6–8.3)
PROT SERPL-MCNC: 7.8 G/DL — SIGNIFICANT CHANGE UP (ref 6–8.3)
PROT UR-MCNC: 30 MG/DL
PROT UR-MCNC: 30 MG/DL
PROTHROM AB SERPL-ACNC: 11.8 SEC — SIGNIFICANT CHANGE UP (ref 9.5–13)
PROTHROM AB SERPL-ACNC: 11.8 SEC — SIGNIFICANT CHANGE UP (ref 9.5–13)
RAPID RVP RESULT: SIGNIFICANT CHANGE UP
RAPID RVP RESULT: SIGNIFICANT CHANGE UP
RBC # BLD: 5.06 M/UL — SIGNIFICANT CHANGE UP (ref 3.8–5.2)
RBC # BLD: 5.06 M/UL — SIGNIFICANT CHANGE UP (ref 3.8–5.2)
RBC # FLD: 16.5 % — HIGH (ref 10.3–14.5)
RBC # FLD: 16.5 % — HIGH (ref 10.3–14.5)
RBC BLD AUTO: ABNORMAL
RBC BLD AUTO: ABNORMAL
RBC CASTS # UR COMP ASSIST: 17 /HPF — HIGH (ref 0–4)
RBC CASTS # UR COMP ASSIST: 17 /HPF — HIGH (ref 0–4)
REVIEW: SIGNIFICANT CHANGE UP
REVIEW: SIGNIFICANT CHANGE UP
RH IG SCN BLD-IMP: POSITIVE — SIGNIFICANT CHANGE UP
RH IG SCN BLD-IMP: POSITIVE — SIGNIFICANT CHANGE UP
RSV RNA SPEC QL NAA+PROBE: SIGNIFICANT CHANGE UP
RSV RNA SPEC QL NAA+PROBE: SIGNIFICANT CHANGE UP
RV+EV RNA SPEC QL NAA+PROBE: SIGNIFICANT CHANGE UP
RV+EV RNA SPEC QL NAA+PROBE: SIGNIFICANT CHANGE UP
SAO2 % BLDV: 33 % — LOW (ref 67–88)
SAO2 % BLDV: 33 % — LOW (ref 67–88)
SARS-COV-2 RNA SPEC QL NAA+PROBE: SIGNIFICANT CHANGE UP
SARS-COV-2 RNA SPEC QL NAA+PROBE: SIGNIFICANT CHANGE UP
SODIUM SERPL-SCNC: 128 MMOL/L — LOW (ref 135–145)
SODIUM SERPL-SCNC: 128 MMOL/L — LOW (ref 135–145)
SODIUM SERPL-SCNC: 132 MMOL/L — LOW (ref 135–145)
SODIUM SERPL-SCNC: 132 MMOL/L — LOW (ref 135–145)
SP GR SPEC: 1.02 — SIGNIFICANT CHANGE UP (ref 1–1.03)
SP GR SPEC: 1.02 — SIGNIFICANT CHANGE UP (ref 1–1.03)
SQUAMOUS # UR AUTO: 9 /HPF — HIGH (ref 0–5)
SQUAMOUS # UR AUTO: 9 /HPF — HIGH (ref 0–5)
TROPONIN T, HIGH SENSITIVITY RESULT: 19 NG/L — SIGNIFICANT CHANGE UP
TROPONIN T, HIGH SENSITIVITY RESULT: 19 NG/L — SIGNIFICANT CHANGE UP
TROPONIN T, HIGH SENSITIVITY RESULT: 20 NG/L — SIGNIFICANT CHANGE UP
TROPONIN T, HIGH SENSITIVITY RESULT: 20 NG/L — SIGNIFICANT CHANGE UP
UROBILINOGEN FLD QL: 1 MG/DL — SIGNIFICANT CHANGE UP (ref 0.2–1)
UROBILINOGEN FLD QL: 1 MG/DL — SIGNIFICANT CHANGE UP (ref 0.2–1)
VARIANT LYMPHS # BLD: 2.7 % — SIGNIFICANT CHANGE UP (ref 0–6)
VARIANT LYMPHS # BLD: 2.7 % — SIGNIFICANT CHANGE UP (ref 0–6)
WBC # BLD: 10.35 K/UL — SIGNIFICANT CHANGE UP (ref 3.8–10.5)
WBC # BLD: 10.35 K/UL — SIGNIFICANT CHANGE UP (ref 3.8–10.5)
WBC # FLD AUTO: 10.35 K/UL — SIGNIFICANT CHANGE UP (ref 3.8–10.5)
WBC # FLD AUTO: 10.35 K/UL — SIGNIFICANT CHANGE UP (ref 3.8–10.5)
WBC UR QL: 5 /HPF — SIGNIFICANT CHANGE UP (ref 0–5)
WBC UR QL: 5 /HPF — SIGNIFICANT CHANGE UP (ref 0–5)

## 2024-01-05 PROCEDURE — 71046 X-RAY EXAM CHEST 2 VIEWS: CPT | Mod: 26

## 2024-01-05 PROCEDURE — 99223 1ST HOSP IP/OBS HIGH 75: CPT

## 2024-01-05 PROCEDURE — 99285 EMERGENCY DEPT VISIT HI MDM: CPT | Mod: 25

## 2024-01-05 PROCEDURE — 74176 CT ABD & PELVIS W/O CONTRAST: CPT | Mod: 26,MA

## 2024-01-05 RX ORDER — SODIUM CHLORIDE 9 MG/ML
1000 INJECTION, SOLUTION INTRAVENOUS ONCE
Refills: 0 | Status: COMPLETED | OUTPATIENT
Start: 2024-01-05 | End: 2024-01-05

## 2024-01-05 RX ORDER — MORPHINE SULFATE 50 MG/1
4 CAPSULE, EXTENDED RELEASE ORAL ONCE
Refills: 0 | Status: DISCONTINUED | OUTPATIENT
Start: 2024-01-05 | End: 2024-01-05

## 2024-01-05 RX ORDER — POTASSIUM CHLORIDE 20 MEQ
10 PACKET (EA) ORAL
Refills: 0 | Status: DISCONTINUED | OUTPATIENT
Start: 2024-01-05 | End: 2024-01-09

## 2024-01-05 RX ORDER — ONDANSETRON 8 MG/1
4 TABLET, FILM COATED ORAL ONCE
Refills: 0 | Status: COMPLETED | OUTPATIENT
Start: 2024-01-05 | End: 2024-01-05

## 2024-01-05 RX ORDER — POTASSIUM CHLORIDE 20 MEQ
10 PACKET (EA) ORAL
Refills: 0 | Status: COMPLETED | OUTPATIENT
Start: 2024-01-05 | End: 2024-01-05

## 2024-01-05 RX ORDER — METOPROLOL TARTRATE 50 MG
5 TABLET ORAL EVERY 6 HOURS
Refills: 0 | Status: DISCONTINUED | OUTPATIENT
Start: 2024-01-05 | End: 2024-01-15

## 2024-01-05 RX ORDER — IPRATROPIUM/ALBUTEROL SULFATE 18-103MCG
3 AEROSOL WITH ADAPTER (GRAM) INHALATION
Refills: 0 | Status: DISCONTINUED | OUTPATIENT
Start: 2024-01-05 | End: 2024-01-05

## 2024-01-05 RX ORDER — HEPARIN SODIUM 5000 [USP'U]/ML
5000 INJECTION INTRAVENOUS; SUBCUTANEOUS EVERY 8 HOURS
Refills: 0 | Status: DISCONTINUED | OUTPATIENT
Start: 2024-01-05 | End: 2024-01-18

## 2024-01-05 RX ORDER — SODIUM CHLORIDE 9 MG/ML
500 INJECTION INTRAMUSCULAR; INTRAVENOUS; SUBCUTANEOUS ONCE
Refills: 0 | Status: COMPLETED | OUTPATIENT
Start: 2024-01-05 | End: 2024-01-05

## 2024-01-05 RX ORDER — ACETAMINOPHEN 500 MG
1000 TABLET ORAL ONCE
Refills: 0 | Status: COMPLETED | OUTPATIENT
Start: 2024-01-05 | End: 2024-01-05

## 2024-01-05 RX ORDER — SODIUM CHLORIDE 9 MG/ML
500 INJECTION, SOLUTION INTRAVENOUS ONCE
Refills: 0 | Status: COMPLETED | OUTPATIENT
Start: 2024-01-05 | End: 2024-01-05

## 2024-01-05 RX ORDER — POTASSIUM CHLORIDE 20 MEQ
10 PACKET (EA) ORAL ONCE
Refills: 0 | Status: DISCONTINUED | OUTPATIENT
Start: 2024-01-05 | End: 2024-01-05

## 2024-01-05 RX ORDER — PANTOPRAZOLE SODIUM 20 MG/1
40 TABLET, DELAYED RELEASE ORAL DAILY
Refills: 0 | Status: DISCONTINUED | OUTPATIENT
Start: 2024-01-05 | End: 2024-01-15

## 2024-01-05 RX ORDER — SODIUM CHLORIDE 9 MG/ML
1000 INJECTION INTRAMUSCULAR; INTRAVENOUS; SUBCUTANEOUS
Refills: 0 | Status: DISCONTINUED | OUTPATIENT
Start: 2024-01-05 | End: 2024-01-06

## 2024-01-05 RX ADMIN — HEPARIN SODIUM 5000 UNIT(S): 5000 INJECTION INTRAVENOUS; SUBCUTANEOUS at 15:48

## 2024-01-05 RX ADMIN — Medication 100 MILLIEQUIVALENT(S): at 18:38

## 2024-01-05 RX ADMIN — Medication 100 MILLIEQUIVALENT(S): at 10:38

## 2024-01-05 RX ADMIN — HEPARIN SODIUM 5000 UNIT(S): 5000 INJECTION INTRAVENOUS; SUBCUTANEOUS at 23:33

## 2024-01-05 RX ADMIN — Medication 1000 MILLIGRAM(S): at 17:56

## 2024-01-05 RX ADMIN — MORPHINE SULFATE 4 MILLIGRAM(S): 50 CAPSULE, EXTENDED RELEASE ORAL at 04:00

## 2024-01-05 RX ADMIN — Medication 5 MILLIGRAM(S): at 23:33

## 2024-01-05 RX ADMIN — Medication 100 MILLIEQUIVALENT(S): at 19:40

## 2024-01-05 RX ADMIN — ONDANSETRON 4 MILLIGRAM(S): 8 TABLET, FILM COATED ORAL at 07:04

## 2024-01-05 RX ADMIN — Medication 400 MILLIGRAM(S): at 07:04

## 2024-01-05 RX ADMIN — SODIUM CHLORIDE 500 MILLILITER(S): 9 INJECTION, SOLUTION INTRAVENOUS at 07:19

## 2024-01-05 RX ADMIN — SODIUM CHLORIDE 100 MILLILITER(S): 9 INJECTION INTRAMUSCULAR; INTRAVENOUS; SUBCUTANEOUS at 18:38

## 2024-01-05 RX ADMIN — MORPHINE SULFATE 4 MILLIGRAM(S): 50 CAPSULE, EXTENDED RELEASE ORAL at 17:56

## 2024-01-05 RX ADMIN — Medication 100 MILLIEQUIVALENT(S): at 09:43

## 2024-01-05 RX ADMIN — SODIUM CHLORIDE 1000 MILLILITER(S): 9 INJECTION, SOLUTION INTRAVENOUS at 10:37

## 2024-01-05 RX ADMIN — MORPHINE SULFATE 4 MILLIGRAM(S): 50 CAPSULE, EXTENDED RELEASE ORAL at 02:50

## 2024-01-05 RX ADMIN — Medication 100 MILLIEQUIVALENT(S): at 21:53

## 2024-01-05 RX ADMIN — SODIUM CHLORIDE 500 MILLILITER(S): 9 INJECTION INTRAMUSCULAR; INTRAVENOUS; SUBCUTANEOUS at 04:00

## 2024-01-05 RX ADMIN — SODIUM CHLORIDE 100 MILLILITER(S): 9 INJECTION INTRAMUSCULAR; INTRAVENOUS; SUBCUTANEOUS at 08:58

## 2024-01-05 RX ADMIN — Medication 100 MILLIEQUIVALENT(S): at 11:36

## 2024-01-05 NOTE — H&P ADULT - HISTORY OF PRESENT ILLNESS
A 73 year old Female with PMHx of HTN, COPD (worked at [employer], no hx tobacco use), fibroids and PSHx of laparoscopic converted to open right hemicolectomy for a cecal mass (04/2023) incisional hernia repair with mesh. Patient presented to the ED for vaginal bleeding on December 12th,2023 and was referred to see a Gynecologist, however, for family restraints she was not able to. The patient reports since December 15th, 2023 she is been having abdominal pain "50/10" in severity globally non-radiating and associated with nausea, vomiting of bilious fluid and watery diarrhea. The diarrhea was mixed with blood, however, she attributed it to the ongoing vaginal bleeding. She also complains of heart racing, palpitations, and shortness of breath. She denies cough or hemoptysis. The patient adds she was supposed to get a cardiac stent placed, however, the hemicolectomy took precedence.     In the ED, the patient is afebrile, appears tachypneic with about 10cc of bilious vomit. Her laboratory values shows electrolyte derangements, with lactate within normal limits 1.8.

## 2024-01-05 NOTE — ED PROVIDER NOTE - OBJECTIVE STATEMENT
73-year-old female history of hypertension, COPD not on home O2, hernia repair with mesh, right hemicolectomy, uterine fibroids who presents with shortness of breath and abdominal pain. She was seen on 12 December for vaginal bleeding, was unable to follow-up with OB/GYN.  Since she was discharged on the 12th she has reported diffuse abdominal pain with diarrhea.  She will have 4-5 bowel movements a day, nonbloody.  Not associated with any nausea/vomiting.  She does endorse decreased p.o. intake.  She also endorses for the past week she has had increased work of breathing/shortness of breath.  She does not take any inhalers at home and is not on home O2.  She denies any fevers, chills, chest pain.

## 2024-01-05 NOTE — H&P ADULT - NSHPPHYSICALEXAM_GEN_ALL_CORE
General: NAD  Neuro: AOx3  HEENT: no visible deformities  Resp: Tachypneic without use of accessory muscles.  Abdomen: Soft, distended, tender globally. mild guarding in RUQ without rebound tenderness. Exp. lap scar healing well.

## 2024-01-05 NOTE — ED ADULT TRIAGE NOTE - CHIEF COMPLAINT QUOTE
Pt arrives to ED c/o ABD pain on and off since 12/12/23 and SOB starting last week.  Pt was seen at [facility] for vaginal bleeding on 12/12/23 and reports she started bleeding again yesterday (bright red blood).  Pt arrives on 4 Liters Oxygen via nasal cannula by EMS.  Pt had colon surgery 4/2023.  Past hx of hernia mesh, gall bladder removal  --C/O abd pain, SOB, vaginal bleeding, hypotensive in triage (96/76). Pt arrives to ED c/o ABD pain on and off since 12/12/23 and SOB starting last week.  Pt was seen at [facility] for vaginal bleeding on 12/12/23 and reports she started bleeding again yesterday (bright red blood).  Pt arrives on 4 Liters Oxygen via nasal cannula by EMS.  Pt had colon surgery 4/2023.  Past hx of hernia mesh, gall bladder removal  --C/O abd pain, SOB, vaginal bleeding, hypotensive in triage (96/76).

## 2024-01-05 NOTE — ED PROVIDER NOTE - ATTENDING CONTRIBUTION TO CARE
73-year-old female past no history hypertension, COPD, hernia pair with mesh, colon cancer status post right hemicolectomy, uterine fibroids presents with shortness of breath abdominal pain.  Patient reports abdominal pain and diarrhea for several weeks worsening for the past week.  Does report diarrhea, nonbloody.  Last bowel movement earlier today.  Reports nausea but no vomiting.  Reports able to only tolerate liquids not solids for the past several weeks.  Reports when she eats causes increased pain.  Patient reports increased shortness of breath for the past 1 week denies cough fever or chills.  She denies any chest pain.  No back pain.  Patient denies extremity pain numbness or swelling.  Exam as above.  Suspect abdominal pain poor p.o. intake concern for bowel obstruction versus new/recurrent malignancy.  Patient tachypneic however lungs auscultation and SpO2 100% room air.?  Possible compensatory tachypnea for acidosis?  Plan: Labs, CT, simply, reassess.

## 2024-01-05 NOTE — CONSULT NOTE ADULT - SUBJECTIVE AND OBJECTIVE BOX
JUSTINE ANAYA  73y  Female [id number]    HPI:        Name of GYN Physician: Has not seen GYN in past 10 years  OBHx: x2   GynHx: Hx fibroids. 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     Vital Signs Last 24 Hrs  T(C): 36.6 (2024 12:24), Max: 36.9 (2024 09:09)  T(F): 97.8 (2024 12:24), Max: 98.5 (2024 09:09)  HR: 79 (2024 12:24) (79 - 99)  BP: 99/70 (2024 12:24) (96/76 - 123/58)  BP(mean): 73 (2024 09:09) (73 - 73)  RR: 18 (2024 12:24) (16 - 18)  SpO2: 100% (2024 12:24) (99% - 100%)    Parameters below as of 2024 12:24  Patient On (Oxygen Delivery Method): room air        Physical Exam:   General: sitting comfortably in bed, NAD   Lungs: normal respirations  Abd: Soft, distended. Generalized tenderness to palpation.    : No bleeding on pad. External labia wnl. Bimanual exam with bulky fibroid uterus, no cervical masses appreciated. No blood noted. Rectal exam also performed with no blood noted. Kitchen catheter in place.   Chaperone: Rosa Terry RN      LABS:                          12.6   10.35 )-----------( 357      ( 2024 02:50 )             37.8     01-05    128<L>  |  89<L>  |  68<H>  ----------------------------<  99  3.3<L>   |  19<L>  |  2.31<H>    Ca    9.9      2024 02:50    TPro  7.8  /  Alb  4.0  /  TBili  0.8  /  DBili  x   /  AST  14  /  ALT  14  /  AlkPhos  96  -05    I&O's Detail    PT/INR - ( 2024 02:50 )   PT: 11.8 sec;   INR: 1.05 ratio         PTT - ( 2024 02:50 )  PTT:28.1 sec  Urinalysis Basic - ( 2024 04:18 )    Color: Yellow / Appearance: Cloudy / S.020 / pH: x  Gluc: x / Ketone: 15 mg/dL  / Bili: Negative / Urobili: 1.0 mg/dL   Blood: x / Protein: 30 mg/dL / Nitrite: Negative   Leuk Esterase: Trace / RBC: 17 /HPF / WBC 5 /HPF   Sq Epi: x / Non Sq Epi: 9 /HPF / Bacteria: Negative /HPF        RADIOLOGY & ADDITIONAL STUDIES:     JUSTINE ANAYA  73y  Female [id number]    HPI:        Name of GYN Physician: Has not seen GYN in past 10 years  OBHx: x2   GynHx: Hx fibroids. 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     Vital Signs Last 24 Hrs  T(C): 36.6 (2024 12:24), Max: 36.9 (2024 09:09)  T(F): 97.8 (2024 12:24), Max: 98.5 (2024 09:09)  HR: 79 (2024 12:24) (79 - 99)  BP: 99/70 (2024 12:24) (96/76 - 123/58)  BP(mean): 73 (2024 09:09) (73 - 73)  RR: 18 (2024 12:24) (16 - 18)  SpO2: 100% (2024 12:24) (99% - 100%)    Parameters below as of 2024 12:24  Patient On (Oxygen Delivery Method): room air        Physical Exam:   General: sitting comfortably in bed, NAD   Lungs: normal respirations  Abd: Soft, distended. Generalized tenderness to palpation.    : No bleeding on pad. External labia wnl. Bimanual exam with bulky fibroid uterus, no cervical masses appreciated. No blood noted. Rectal exam also performed with no blood noted. Kitchen catheter in place.   Chaperone: Rosa Terry RN      LABS:                          12.6   10.35 )-----------( 357      ( 2024 02:50 )             37.8     01-05    128<L>  |  89<L>  |  68<H>  ----------------------------<  99  3.3<L>   |  19<L>  |  2.31<H>    Ca    9.9      2024 02:50    TPro  7.8  /  Alb  4.0  /  TBili  0.8  /  DBili  x   /  AST  14  /  ALT  14  /  AlkPhos  96  -05    I&O's Detail    PT/INR - ( 2024 02:50 )   PT: 11.8 sec;   INR: 1.05 ratio         PTT - ( 2024 02:50 )  PTT:28.1 sec  Urinalysis Basic - ( 2024 04:18 )    Color: Yellow / Appearance: Cloudy / S.020 / pH: x  Gluc: x / Ketone: 15 mg/dL  / Bili: Negative / Urobili: 1.0 mg/dL   Blood: x / Protein: 30 mg/dL / Nitrite: Negative   Leuk Esterase: Trace / RBC: 17 /HPF / WBC 5 /HPF   Sq Epi: x / Non Sq Epi: 9 /HPF / Bacteria: Negative /HPF        RADIOLOGY & ADDITIONAL STUDIES:     JUSTINE ANAYA  73y  Female [id number]    HPI: 74 y/o  post menopausal female currently admitted to surgery for high grade SBO. 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 GYN consulted for recent post menopausal vaginal bleeding. Patient states she was seen in [facility] ED on Dec 12th 2023 for a 3 week episode of vaginal bleeding. Patient states the bleeding was light and noticed a stripe of dark red blood on pads. She states she never fully saturated pads and denies passage of large clots. Patient also admits to one other prior episode of post menopausal bleeding when she was 60 years old in which she was informed was due to her fibroids and there was no further management needed. Patient was unable to attend GYN appointment Dec 21st and states she has not seen anyone for 10+ years. Patient reports symptoms of nausea, vomiting and abdominal pain are significantly improved since admission but reports continued diarrhea without blood. She states she has also not noticed vaginal bleeding for the past few days.         Name of GYN Physician: Has not seen GYN in past 10 years  OBHx: x2   GynHx: Hx fibroids. 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     Vital Signs Last 24 Hrs  T(C): 36.6 (2024 12:24), Max: 36.9 (2024 09:09)  T(F): 97.8 (2024 12:24), Max: 98.5 (2024 09:09)  HR: 79 (2024 12:24) (79 - 99)  BP: 99/70 (2024 12:24) (96/76 - 123/58)  BP(mean): 73 (2024 09:09) (73 - 73)  RR: 18 (2024 12:24) (16 - 18)  SpO2: 100% (2024 12:24) (99% - 100%)    Parameters below as of 2024 12:24  Patient On (Oxygen Delivery Method): room air        Physical Exam:   General: sitting comfortably in bed, NAD   Lungs: normal respirations  Abd: Soft, distended. Generalized tenderness to palpation.    : No bleeding on pad. External labia wnl. Bimanual exam with bulky fibroid uterus, no cervical masses appreciated. No blood noted. Rectal exam also performed with no blood noted. Kitchen catheter in place.   Chaperone: Rosa Terry RN      LABS:                        12.6   10.35 )-----------( 357      ( 2024 02:50 )             37.8     -    128<L>  |  89<L>  |  68<H>  ----------------------------<  99  3.3<L>   |  19<L>  |  2.31<H>    Ca    9.9      2024 02:50    TPro  7.8  /  Alb  4.0  /  TBili  0.8  /  DBili  x   /  AST  14  /  ALT  14  /  AlkPhos  96  01-05    I&O's Detail    PT/INR - ( 2024 02:50 )   PT: 11.8 sec;   INR: 1.05 ratio         PTT - ( 2024 02:50 )  PTT:28.1 sec  Urinalysis Basic - ( 2024 04:18 )    Color: Yellow / Appearance: Cloudy / S.020 / pH: x  Gluc: x / Ketone: 15 mg/dL  / Bili: Negative / Urobili: 1.0 mg/dL   Blood: x / Protein: 30 mg/dL / Nitrite: Negative   Leuk Esterase: Trace / RBC: 17 /HPF / WBC 5 /HPF   Sq Epi: x / Non Sq Epi: 9 /HPF / Bacteria: Negative /HPF        RADIOLOGY & ADDITIONAL STUDIES:  < from: US Transvaginal (23 @ 16:12) >    ACC: [id number] EXAM:  US TRANSVAGINAL   ORDERED BY: LAURA KONG     PROCEDURE DATE:  2023          INTERPRETATION:  CLINICAL INFORMATION: Vaginal bleeding. History of   uterine fibroids.    LMP: Postmenopausal patient.    COMPARISON: CT abdomen pelvis 2023.    TECHNIQUE:  Endovaginal pelvic sonogram as per order. Transabdominal pelvic sonogram   was also performed as part of our standard protocol. Color and Spectral   Doppler was performed.    FINDINGS:  Uterus: Limited evaluation of the uterus due to multiple fibroids. 13.8   cm x 7.9 cm x 11.1 cm.  -An anterior uterine fibroid measures 2.4 x 2.3 x 2.3 cm.  -A posterior partially calcified uterine fibroid measures 4.4 x 4.5 x 5.3   cm.  Endometrium: Limited evaluation of the endometrium due to uterine   fibroids.    Right ovary: 2.9 cm x 2.0 cm x 3.0 cm. An ovarian cyst measures 1.5 cm.   Normal arterial and venous waveforms.  Left ovaries not visualized.    Fluid: None.    IMPRESSION:  Limited evaluation of the uterus andendometrium due to multiple uterine   fibroids.  If clinically warranted, further evaluation can be considered with MR   pelvis.        --- End of Report ---        ALLYSON GARCIA MD; Attending Radiologist  This document has been electronically signed. Dec 12 2023  4:21PM    < end of copied text >    < from: CT Abdomen and Pelvis No Cont (24 @ 06:07) >    ACC: [id number] EXAM:  CT ABDOMEN AND PELVIS   ORDERED BY: RONIT RANGEL     PROCEDURE DATE:  2024          INTERPRETATION:  CLINICAL INFORMATION: Abdominal pain. Acute kidney   injury.    COMPARISON: CT abdomen and pelvis from 2023.    CONTRAST/COMPLICATIONS:  IV Contrast: NONE  Oral Contrast: NONE  Complications: None reported at time of study completion    PROCEDURE:  CT of the Abdomen and Pelvis was performed.  Sagittal and coronal reformats were performed.    FINDINGS:  LOWER CHEST: Atherosclerotic calcifications of the coronary arteries.    LIVER: Within normal limits.  BILE DUCTS: Normal caliber.  GALLBLADDER: Cholecystectomy.  SPLEEN: Within normal limits.  PANCREAS: Within normal limits.  ADRENALS: Within normal limits.  KIDNEYS/URETERS: Two left renal cysts measure up to approximately 4.8 cm   (301:47) and 3.7 cm (301:47) in greatest dimension.    BLADDER: Collapsed around a Kitchen catheter.  REPRODUCTIVE ORGANS: Enlarged, lobulated uterus with a 5 cm calcified   fibroid (602:70) and multiple smaller noncalcified fibroids.    BOWEL: Status post right hemicolectomy with right upper quadrant   ileocolic anastomosis.  There is high-grade small bowel obstruction with   single transition point located in the distal small bowel, just proximal   to the ileocolic anastomosis  (301:51, 601:72 and 602:51).  No associated   bowel wall thickening, pneumatosis or significant mesenteric edema.  The   residual colon is diffusely collapsed.  There is left-sided colonic   diverticulosis without evidence for acute diverticulitis.  PERITONEUM: No ascites, free air or abscess.  VESSELS: Mild atherosclerotic calcification of the aortoiliac tree.  RETROPERITONEUM/LYMPH NODES: No lymphadenopathy.  ABDOMINAL WALL: Postsurgical changes.  BONES: Lumbar scoliosis, convex to left with the apex at L3-L4.    Multilevel lumbar spine degenerative changes with moderate spinal canal   stenosis at L3-L4 and L4-L5.  Moderate hip osteoarthrosis bilaterally.    IMPRESSION:  Status post right hemicolectomy with right upper quadrant ileocolic   anastomosis.    High-grade small bowel obstruction with single transition point located   in the distal small bowel, just proximal to the ileocolic anastomosis.    The findings are likely on the basis of an adhesion.  There is no   evidence of gastrointestinal perforation, abscess/drainable fluid   collection, or secondary CT signs of bowel ischemia.      --- End of Report ---      JUSTO LOVETT MD; Resident Radiologist  This document has been electronically signed.  SOO DIAMOND MD; Attending Radiologist  This document has been electronically signed. 2024  6:48AM    < end of copied text >   JUSTINE ANAYA  73y  Female [id number]    HPI: 72 y/o  post menopausal female currently admitted to surgery for high grade SBO. 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 GYN consulted for recent post menopausal vaginal bleeding. Patient states she was seen in [facility] ED on Dec 12th 2023 for a 3 week episode of vaginal bleeding. Patient states the bleeding was light and noticed a stripe of dark red blood on pads. She states she never fully saturated pads and denies passage of large clots. Patient also admits to one other prior episode of post menopausal bleeding when she was 60 years old in which she was informed was due to her fibroids and there was no further management needed. Patient was unable to attend GYN appointment Dec 21st and states she has not seen anyone for 10+ years. Patient reports symptoms of nausea, vomiting and abdominal pain are significantly improved since admission but reports continued diarrhea without blood. She states she has also not noticed vaginal bleeding for the past few days.         Name of GYN Physician: Has not seen GYN in past 10 years  OBHx: x2   GynHx: Hx fibroids. 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     Vital Signs Last 24 Hrs  T(C): 36.6 (2024 12:24), Max: 36.9 (2024 09:09)  T(F): 97.8 (2024 12:24), Max: 98.5 (2024 09:09)  HR: 79 (2024 12:24) (79 - 99)  BP: 99/70 (2024 12:24) (96/76 - 123/58)  BP(mean): 73 (2024 09:09) (73 - 73)  RR: 18 (2024 12:24) (16 - 18)  SpO2: 100% (2024 12:24) (99% - 100%)    Parameters below as of 2024 12:24  Patient On (Oxygen Delivery Method): room air        Physical Exam:   General: sitting comfortably in bed, NAD   Lungs: normal respirations  Abd: Soft, distended. Generalized tenderness to palpation.    : No bleeding on pad. External labia wnl. Bimanual exam with bulky fibroid uterus, no cervical masses appreciated. No blood noted. Rectal exam also performed with no blood noted. Kitchen catheter in place.   Chaperone: Rosa Terry RN      LABS:                        12.6   10.35 )-----------( 357      ( 2024 02:50 )             37.8     -    128<L>  |  89<L>  |  68<H>  ----------------------------<  99  3.3<L>   |  19<L>  |  2.31<H>    Ca    9.9      2024 02:50    TPro  7.8  /  Alb  4.0  /  TBili  0.8  /  DBili  x   /  AST  14  /  ALT  14  /  AlkPhos  96  01-05    I&O's Detail    PT/INR - ( 2024 02:50 )   PT: 11.8 sec;   INR: 1.05 ratio         PTT - ( 2024 02:50 )  PTT:28.1 sec  Urinalysis Basic - ( 2024 04:18 )    Color: Yellow / Appearance: Cloudy / S.020 / pH: x  Gluc: x / Ketone: 15 mg/dL  / Bili: Negative / Urobili: 1.0 mg/dL   Blood: x / Protein: 30 mg/dL / Nitrite: Negative   Leuk Esterase: Trace / RBC: 17 /HPF / WBC 5 /HPF   Sq Epi: x / Non Sq Epi: 9 /HPF / Bacteria: Negative /HPF        RADIOLOGY & ADDITIONAL STUDIES:  < from: US Transvaginal (23 @ 16:12) >    ACC: [id number] EXAM:  US TRANSVAGINAL   ORDERED BY: LAURA KONG     PROCEDURE DATE:  2023          INTERPRETATION:  CLINICAL INFORMATION: Vaginal bleeding. History of   uterine fibroids.    LMP: Postmenopausal patient.    COMPARISON: CT abdomen pelvis 2023.    TECHNIQUE:  Endovaginal pelvic sonogram as per order. Transabdominal pelvic sonogram   was also performed as part of our standard protocol. Color and Spectral   Doppler was performed.    FINDINGS:  Uterus: Limited evaluation of the uterus due to multiple fibroids. 13.8   cm x 7.9 cm x 11.1 cm.  -An anterior uterine fibroid measures 2.4 x 2.3 x 2.3 cm.  -A posterior partially calcified uterine fibroid measures 4.4 x 4.5 x 5.3   cm.  Endometrium: Limited evaluation of the endometrium due to uterine   fibroids.    Right ovary: 2.9 cm x 2.0 cm x 3.0 cm. An ovarian cyst measures 1.5 cm.   Normal arterial and venous waveforms.  Left ovaries not visualized.    Fluid: None.    IMPRESSION:  Limited evaluation of the uterus andendometrium due to multiple uterine   fibroids.  If clinically warranted, further evaluation can be considered with MR   pelvis.        --- End of Report ---        ALLYSON GARCIA MD; Attending Radiologist  This document has been electronically signed. Dec 12 2023  4:21PM    < end of copied text >    < from: CT Abdomen and Pelvis No Cont (24 @ 06:07) >    ACC: [id number] EXAM:  CT ABDOMEN AND PELVIS   ORDERED BY: RONIT RANGEL     PROCEDURE DATE:  2024          INTERPRETATION:  CLINICAL INFORMATION: Abdominal pain. Acute kidney   injury.    COMPARISON: CT abdomen and pelvis from 2023.    CONTRAST/COMPLICATIONS:  IV Contrast: NONE  Oral Contrast: NONE  Complications: None reported at time of study completion    PROCEDURE:  CT of the Abdomen and Pelvis was performed.  Sagittal and coronal reformats were performed.    FINDINGS:  LOWER CHEST: Atherosclerotic calcifications of the coronary arteries.    LIVER: Within normal limits.  BILE DUCTS: Normal caliber.  GALLBLADDER: Cholecystectomy.  SPLEEN: Within normal limits.  PANCREAS: Within normal limits.  ADRENALS: Within normal limits.  KIDNEYS/URETERS: Two left renal cysts measure up to approximately 4.8 cm   (301:47) and 3.7 cm (301:47) in greatest dimension.    BLADDER: Collapsed around a Kitchen catheter.  REPRODUCTIVE ORGANS: Enlarged, lobulated uterus with a 5 cm calcified   fibroid (602:70) and multiple smaller noncalcified fibroids.    BOWEL: Status post right hemicolectomy with right upper quadrant   ileocolic anastomosis.  There is high-grade small bowel obstruction with   single transition point located in the distal small bowel, just proximal   to the ileocolic anastomosis  (301:51, 601:72 and 602:51).  No associated   bowel wall thickening, pneumatosis or significant mesenteric edema.  The   residual colon is diffusely collapsed.  There is left-sided colonic   diverticulosis without evidence for acute diverticulitis.  PERITONEUM: No ascites, free air or abscess.  VESSELS: Mild atherosclerotic calcification of the aortoiliac tree.  RETROPERITONEUM/LYMPH NODES: No lymphadenopathy.  ABDOMINAL WALL: Postsurgical changes.  BONES: Lumbar scoliosis, convex to left with the apex at L3-L4.    Multilevel lumbar spine degenerative changes with moderate spinal canal   stenosis at L3-L4 and L4-L5.  Moderate hip osteoarthrosis bilaterally.    IMPRESSION:  Status post right hemicolectomy with right upper quadrant ileocolic   anastomosis.    High-grade small bowel obstruction with single transition point located   in the distal small bowel, just proximal to the ileocolic anastomosis.    The findings are likely on the basis of an adhesion.  There is no   evidence of gastrointestinal perforation, abscess/drainable fluid   collection, or secondary CT signs of bowel ischemia.      --- End of Report ---      JUSTO LOVETT MD; Resident Radiologist  This document has been electronically signed.  SOO DIAMOND MD; Attending Radiologist  This document has been electronically signed. 2024  6:48AM    < end of copied text >   JUSTINE ANAYA  73y  Female [id number]    HPI: 72 y/o  post menopausal female currently admitted to surgery for high grade SBO. 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 GYN consulted for recent post menopausal vaginal bleeding. Patient states she was seen in [facility] ED on Dec 12th 2023 for a 3 week episode of vaginal bleeding. Patient states the bleeding was light and noticed a stripe of dark red blood on pads. She states she never fully saturated pads and denies passage of large clots. Patient also admits to one other prior episode of post menopausal bleeding when she was 60 years old in which she was informed was due to her fibroids and there was no further management needed. Patient was unable to attend GYN appointment Dec 21st and states she has not seen anyone for 10+ years. Patient reports symptoms of nausea, vomiting and abdominal pain are significantly improved since admission but reports continued diarrhea without blood. She states she has also not noticed vaginal bleeding for the past few days.         Name of GYN Physician: Has not seen GYN in past 10 years  OBHx: x2   GynHx: Hx fibroids. 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     Vital Signs Last 24 Hrs  T(C): 36.6 (2024 12:24), Max: 36.9 (2024 09:09)  T(F): 97.8 (2024 12:24), Max: 98.5 (2024 09:09)  HR: 79 (2024 12:24) (79 - 99)  BP: 99/70 (2024 12:24) (96/76 - 123/58)  BP(mean): 73 (2024 09:09) (73 - 73)  RR: 18 (2024 12:24) (16 - 18)  SpO2: 100% (2024 12:24) (99% - 100%)    Parameters below as of 2024 12:24  Patient On (Oxygen Delivery Method): room air        Physical Exam:   General: sitting comfortably in bed, NAD   Lungs: normal respirations  Abd: Soft, distended. Generalized tenderness to palpation.    : No bleeding on pad. External labia wnl. Bimanual exam with bulky fibroid uterus, no cervical masses appreciated. No blood noted.  Chaperone: Rosa Terry RN      LABS:                        12.6   10.35 )-----------( 357      ( 2024 02:50 )             37.8     -    128<L>  |  89<L>  |  68<H>  ----------------------------<  99  3.3<L>   |  19<L>  |  2.31<H>    Ca    9.9      2024 02:50    TPro  7.8  /  Alb  4.0  /  TBili  0.8  /  DBili  x   /  AST  14  /  ALT  14  /  AlkPhos  96  01-05    I&O's Detail    PT/INR - ( 2024 02:50 )   PT: 11.8 sec;   INR: 1.05 ratio         PTT - ( 2024 02:50 )  PTT:28.1 sec  Urinalysis Basic - ( 2024 04:18 )    Color: Yellow / Appearance: Cloudy / S.020 / pH: x  Gluc: x / Ketone: 15 mg/dL  / Bili: Negative / Urobili: 1.0 mg/dL   Blood: x / Protein: 30 mg/dL / Nitrite: Negative   Leuk Esterase: Trace / RBC: 17 /HPF / WBC 5 /HPF   Sq Epi: x / Non Sq Epi: 9 /HPF / Bacteria: Negative /HPF        RADIOLOGY & ADDITIONAL STUDIES:  < from: US Transvaginal (23 @ 16:12) >    ACC: [id number] EXAM:  US TRANSVAGINAL   ORDERED BY: LAURA KONG     PROCEDURE DATE:  2023          INTERPRETATION:  CLINICAL INFORMATION: Vaginal bleeding. History of   uterine fibroids.    LMP: Postmenopausal patient.    COMPARISON: CT abdomen pelvis 2023.    TECHNIQUE:  Endovaginal pelvic sonogram as per order. Transabdominal pelvic sonogram   was also performed as part of our standard protocol. Color and Spectral   Doppler was performed.    FINDINGS:  Uterus: Limited evaluation of the uterus due to multiple fibroids. 13.8   cm x 7.9 cm x 11.1 cm.  -An anterior uterine fibroid measures 2.4 x 2.3 x 2.3 cm.  -A posterior partially calcified uterine fibroid measures 4.4 x 4.5 x 5.3   cm.  Endometrium: Limited evaluation of the endometrium due to uterine   fibroids.    Right ovary: 2.9 cm x 2.0 cm x 3.0 cm. An ovarian cyst measures 1.5 cm.   Normal arterial and venous waveforms.  Left ovaries not visualized.    Fluid: None.    IMPRESSION:  Limited evaluation of the uterus andendometrium due to multiple uterine   fibroids.  If clinically warranted, further evaluation can be considered with MR   pelvis.        --- End of Report ---        ALLYSON GARCIA MD; Attending Radiologist  This document has been electronically signed. Dec 12 2023  4:21PM    < end of copied text >    < from: CT Abdomen and Pelvis No Cont (24 @ 06:07) >    ACC: [id number] EXAM:  CT ABDOMEN AND PELVIS   ORDERED BY: RONIT RANGEL     PROCEDURE DATE:  2024          INTERPRETATION:  CLINICAL INFORMATION: Abdominal pain. Acute kidney   injury.    COMPARISON: CT abdomen and pelvis from 2023.    CONTRAST/COMPLICATIONS:  IV Contrast: NONE  Oral Contrast: NONE  Complications: None reported at time of study completion    PROCEDURE:  CT of the Abdomen and Pelvis was performed.  Sagittal and coronal reformats were performed.    FINDINGS:  LOWER CHEST: Atherosclerotic calcifications of the coronary arteries.    LIVER: Within normal limits.  BILE DUCTS: Normal caliber.  GALLBLADDER: Cholecystectomy.  SPLEEN: Within normal limits.  PANCREAS: Within normal limits.  ADRENALS: Within normal limits.  KIDNEYS/URETERS: Two left renal cysts measure up to approximately 4.8 cm   (301:47) and 3.7 cm (301:47) in greatest dimension.    BLADDER: Collapsed around a Kitchen catheter.  REPRODUCTIVE ORGANS: Enlarged, lobulated uterus with a 5 cm calcified   fibroid (602:70) and multiple smaller noncalcified fibroids.    BOWEL: Status post right hemicolectomy with right upper quadrant   ileocolic anastomosis.  There is high-grade small bowel obstruction with   single transition point located in the distal small bowel, just proximal   to the ileocolic anastomosis  (301:51, 601:72 and 602:51).  No associated   bowel wall thickening, pneumatosis or significant mesenteric edema.  The   residual colon is diffusely collapsed.  There is left-sided colonic   diverticulosis without evidence for acute diverticulitis.  PERITONEUM: No ascites, free air or abscess.  VESSELS: Mild atherosclerotic calcification of the aortoiliac tree.  RETROPERITONEUM/LYMPH NODES: No lymphadenopathy.  ABDOMINAL WALL: Postsurgical changes.  BONES: Lumbar scoliosis, convex to left with the apex at L3-L4.    Multilevel lumbar spine degenerative changes with moderate spinal canal   stenosis at L3-L4 and L4-L5.  Moderate hip osteoarthrosis bilaterally.    IMPRESSION:  Status post right hemicolectomy with right upper quadrant ileocolic   anastomosis.    High-grade small bowel obstruction with single transition point located   in the distal small bowel, just proximal to the ileocolic anastomosis.    The findings are likely on the basis of an adhesion.  There is no   evidence of gastrointestinal perforation, abscess/drainable fluid   collection, or secondary CT signs of bowel ischemia.      --- End of Report ---      JUSTO LOVETT MD; Resident Radiologist  This document has been electronically signed.  SOO DIAMOND MD; Attending Radiologist  This document has been electronically signed. 2024  6:48AM    < end of copied text >   JUSTINE ANAYA  73y  Female [id number]    HPI: 74 y/o  post menopausal female currently admitted to surgery for high grade SBO. 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 GYN consulted for recent post menopausal vaginal bleeding. Patient states she was seen in [facility] ED on Dec 12th 2023 for a 3 week episode of vaginal bleeding. Patient states the bleeding was light and noticed a stripe of dark red blood on pads. She states she never fully saturated pads and denies passage of large clots. Patient also admits to one other prior episode of post menopausal bleeding when she was 60 years old in which she was informed was due to her fibroids and there was no further management needed. Patient was unable to attend GYN appointment Dec 21st and states she has not seen anyone for 10+ years. Patient reports symptoms of nausea, vomiting and abdominal pain are significantly improved since admission but reports continued diarrhea without blood. She states she has also not noticed vaginal bleeding for the past few days.         Name of GYN Physician: Has not seen GYN in past 10 years  OBHx: x2   GynHx: Hx fibroids. 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     Vital Signs Last 24 Hrs  T(C): 36.6 (2024 12:24), Max: 36.9 (2024 09:09)  T(F): 97.8 (2024 12:24), Max: 98.5 (2024 09:09)  HR: 79 (2024 12:24) (79 - 99)  BP: 99/70 (2024 12:24) (96/76 - 123/58)  BP(mean): 73 (2024 09:09) (73 - 73)  RR: 18 (2024 12:24) (16 - 18)  SpO2: 100% (2024 12:24) (99% - 100%)    Parameters below as of 2024 12:24  Patient On (Oxygen Delivery Method): room air        Physical Exam:   General: sitting comfortably in bed, NAD   Lungs: normal respirations  Abd: Soft, distended. Generalized tenderness to palpation.    : No bleeding on pad. External labia wnl. Bimanual exam with bulky fibroid uterus, no cervical masses appreciated. No blood noted.  Chaperone: Rosa Terry RN      LABS:                        12.6   10.35 )-----------( 357      ( 2024 02:50 )             37.8     -    128<L>  |  89<L>  |  68<H>  ----------------------------<  99  3.3<L>   |  19<L>  |  2.31<H>    Ca    9.9      2024 02:50    TPro  7.8  /  Alb  4.0  /  TBili  0.8  /  DBili  x   /  AST  14  /  ALT  14  /  AlkPhos  96  01-05    I&O's Detail    PT/INR - ( 2024 02:50 )   PT: 11.8 sec;   INR: 1.05 ratio         PTT - ( 2024 02:50 )  PTT:28.1 sec  Urinalysis Basic - ( 2024 04:18 )    Color: Yellow / Appearance: Cloudy / S.020 / pH: x  Gluc: x / Ketone: 15 mg/dL  / Bili: Negative / Urobili: 1.0 mg/dL   Blood: x / Protein: 30 mg/dL / Nitrite: Negative   Leuk Esterase: Trace / RBC: 17 /HPF / WBC 5 /HPF   Sq Epi: x / Non Sq Epi: 9 /HPF / Bacteria: Negative /HPF        RADIOLOGY & ADDITIONAL STUDIES:  < from: US Transvaginal (23 @ 16:12) >    ACC: [id number] EXAM:  US TRANSVAGINAL   ORDERED BY: LAURA KONG     PROCEDURE DATE:  2023          INTERPRETATION:  CLINICAL INFORMATION: Vaginal bleeding. History of   uterine fibroids.    LMP: Postmenopausal patient.    COMPARISON: CT abdomen pelvis 2023.    TECHNIQUE:  Endovaginal pelvic sonogram as per order. Transabdominal pelvic sonogram   was also performed as part of our standard protocol. Color and Spectral   Doppler was performed.    FINDINGS:  Uterus: Limited evaluation of the uterus due to multiple fibroids. 13.8   cm x 7.9 cm x 11.1 cm.  -An anterior uterine fibroid measures 2.4 x 2.3 x 2.3 cm.  -A posterior partially calcified uterine fibroid measures 4.4 x 4.5 x 5.3   cm.  Endometrium: Limited evaluation of the endometrium due to uterine   fibroids.    Right ovary: 2.9 cm x 2.0 cm x 3.0 cm. An ovarian cyst measures 1.5 cm.   Normal arterial and venous waveforms.  Left ovaries not visualized.    Fluid: None.    IMPRESSION:  Limited evaluation of the uterus andendometrium due to multiple uterine   fibroids.  If clinically warranted, further evaluation can be considered with MR   pelvis.        --- End of Report ---        ALLYSON GARCIA MD; Attending Radiologist  This document has been electronically signed. Dec 12 2023  4:21PM    < end of copied text >    < from: CT Abdomen and Pelvis No Cont (24 @ 06:07) >    ACC: [id number] EXAM:  CT ABDOMEN AND PELVIS   ORDERED BY: RONIT RANGEL     PROCEDURE DATE:  2024          INTERPRETATION:  CLINICAL INFORMATION: Abdominal pain. Acute kidney   injury.    COMPARISON: CT abdomen and pelvis from 2023.    CONTRAST/COMPLICATIONS:  IV Contrast: NONE  Oral Contrast: NONE  Complications: None reported at time of study completion    PROCEDURE:  CT of the Abdomen and Pelvis was performed.  Sagittal and coronal reformats were performed.    FINDINGS:  LOWER CHEST: Atherosclerotic calcifications of the coronary arteries.    LIVER: Within normal limits.  BILE DUCTS: Normal caliber.  GALLBLADDER: Cholecystectomy.  SPLEEN: Within normal limits.  PANCREAS: Within normal limits.  ADRENALS: Within normal limits.  KIDNEYS/URETERS: Two left renal cysts measure up to approximately 4.8 cm   (301:47) and 3.7 cm (301:47) in greatest dimension.    BLADDER: Collapsed around a Kitchen catheter.  REPRODUCTIVE ORGANS: Enlarged, lobulated uterus with a 5 cm calcified   fibroid (602:70) and multiple smaller noncalcified fibroids.    BOWEL: Status post right hemicolectomy with right upper quadrant   ileocolic anastomosis.  There is high-grade small bowel obstruction with   single transition point located in the distal small bowel, just proximal   to the ileocolic anastomosis  (301:51, 601:72 and 602:51).  No associated   bowel wall thickening, pneumatosis or significant mesenteric edema.  The   residual colon is diffusely collapsed.  There is left-sided colonic   diverticulosis without evidence for acute diverticulitis.  PERITONEUM: No ascites, free air or abscess.  VESSELS: Mild atherosclerotic calcification of the aortoiliac tree.  RETROPERITONEUM/LYMPH NODES: No lymphadenopathy.  ABDOMINAL WALL: Postsurgical changes.  BONES: Lumbar scoliosis, convex to left with the apex at L3-L4.    Multilevel lumbar spine degenerative changes with moderate spinal canal   stenosis at L3-L4 and L4-L5.  Moderate hip osteoarthrosis bilaterally.    IMPRESSION:  Status post right hemicolectomy with right upper quadrant ileocolic   anastomosis.    High-grade small bowel obstruction with single transition point located   in the distal small bowel, just proximal to the ileocolic anastomosis.    The findings are likely on the basis of an adhesion.  There is no   evidence of gastrointestinal perforation, abscess/drainable fluid   collection, or secondary CT signs of bowel ischemia.      --- End of Report ---      JUSTO LOVETT MD; Resident Radiologist  This document has been electronically signed.  SOO DIAMOND MD; Attending Radiologist  This document has been electronically signed. 2024  6:48AM    < end of copied text >

## 2024-01-05 NOTE — ED ADULT NURSE NOTE - NSFALLRISKINTERV_ED_ALL_ED
Assistance OOB with selected safe patient handling equipment if applicable/Assistance with ambulation/Communicate fall risk and risk factors to all staff, patient, and family/Monitor gait and stability/Provide patient with walking aids/Provide visual cue: yellow wristband, yellow gown, etc/Reinforce activity limits and safety measures with patient and family/Call bell, personal items and telephone in reach/Instruct patient to call for assistance before getting out of bed/chair/stretcher/Non-slip footwear applied when patient is off stretcher/[place] to call system/Physically safe environment - no spills, clutter or unnecessary equipment/Purposeful Proactive Rounding/Room/bathroom lighting operational, light cord in reach Assistance OOB with selected safe patient handling equipment if applicable/Assistance with ambulation/Communicate fall risk and risk factors to all staff, patient, and family/Monitor gait and stability/Provide patient with walking aids/Provide visual cue: yellow wristband, yellow gown, etc/Reinforce activity limits and safety measures with patient and family/Call bell, personal items and telephone in reach/Instruct patient to call for assistance before getting out of bed/chair/stretcher/Non-slip footwear applied when patient is off stretcher/[place] to call system/Physically safe environment - no spills, clutter or unnecessary equipment/Purposeful Proactive Rounding/Room/bathroom lighting operational, light cord in reach

## 2024-01-05 NOTE — ED ADULT NURSE NOTE - CHIEF COMPLAINT QUOTE
Pt arrives to ED c/o ABD pain on and off since 12/12/23 and SOB starting last week.  Pt was seen at [facility] for vaginal bleeding on 12/12/23 and reports she started bleeding again yesterday (bright red blood).  Pt arrives on 4 Liters Oxygen via nasal cannula by EMS.  Pt had colon surgery 4/2023.  Past hx of hernia mesh, gall bladder removal  --C/O abd pain, SOB, vaginal bleeding, hypotensive in triage (96/76). Pt arrives to ED c/o ABD pain on and off since 12/12/23 and SOB starting last week.  Pt was seen at [facility] for vaginal bleeding on 12/12/23 and reports she started bleeding again yesterday (bright red blood).  Pt arrives on 4 Liters Oxygen via nasal cannula by EMS.  Pt had colon surgery 4/2023.  Past hx of hernia mesh, gall bladder removal  --C/O abd pain, SOB, vaginal bleeding, hypotensive in triage (96/76).

## 2024-01-05 NOTE — ED ADULT NURSE REASSESSMENT NOTE - NS ED NURSE REASSESS COMMENT FT1
14F real placed as per order. pt resting comfortably in stretcher, breathing even and unlabored on room air. Offers no complaints at this time. Instructed to call for assistance.
Pt sitting up in stretcher resting comfortably. Breathing even and unlabored on room air. Pt medicated per EMAR. VS as noted in flow sheet. No acute distress noted. Endorsing mild abdominal comfort at this time. MD aware. Plan of care ongoing, comfort measures provided and safety measures maintained. Awaiting transport.
Break RN: Pt resting comfortably in bed, updated on plan of care, verbalized understanding. Pt denies complaints at this time.

## 2024-01-05 NOTE — CONSULT NOTE ADULT - ASSESSMENT
**incomplete note** **incomplete note**     74 y/o  post menopausal female currently admitted to surgery for high grade SBO.      **incomplete note**     72 y/o  post menopausal female currently admitted to surgery for high grade SBO.      74 y/o  post menopausal female currently admitted to surgery for high grade SBO. CTAP overnight notable for 5cm calcified fibroid and multiple smaller noncalcified fibroids. TVUS from  showed an anterior uterine fibroid measuring 2.4 x 2.3 x 2.3 cm and a posterior partially calcified uterine fibroid measuring 4.4 x 4.5 x 5.3 cm. Endometrial lining unable to be evaluated due to fibroids. Patient reports she has not noticed vaginal bleeding within the past few days and no vaginal bleeding noted on bimanual exam today. Discussed with patient importance of outpatient follow up for vaginal bleeding which would include endometrial sampling.     - No acute GYN intervention   - Patient to re-schedule appointment with Dr. Elizondo for outpatient follow up including endometrial biopsy, patient agreeable to plan  - Re-consult GYN as needed    MELI Miller  d/w Dr. Maya   72 y/o  post menopausal female currently admitted to surgery for high grade SBO. CTAP overnight notable for 5cm calcified fibroid and multiple smaller noncalcified fibroids. TVUS from  showed an anterior uterine fibroid measuring 2.4 x 2.3 x 2.3 cm and a posterior partially calcified uterine fibroid measuring 4.4 x 4.5 x 5.3 cm. Endometrial lining unable to be evaluated due to fibroids. Patient reports she has not noticed vaginal bleeding within the past few days and no vaginal bleeding noted on bimanual exam today. Discussed with patient importance of outpatient follow up for vaginal bleeding which would include endometrial sampling.     - No acute GYN intervention   - Patient to re-schedule appointment with Dr. Elizondo for outpatient follow up including endometrial biopsy, patient agreeable to plan  - Re-consult GYN as needed    MELI Miller  d/w Dr. Maya

## 2024-01-05 NOTE — ED PROVIDER NOTE - PHYSICAL EXAMINATION
Physical Exam:  Gen: Uncomfortable  Head: NCAT  HEENT: Normal conjunctiva, trachea midline, moist mucous membranes  Lung: Tachypneic, CTAB  CV: RRR, no murmurs, rubs or gallops  Abd: Diffuse tenderness, no rebound  MSK: No visible deformities, moves all four extremities   Neuro: No focal motor deficits  Skin: Warm, well perfused, no visible rashes  Psych: appropriate affect and mood

## 2024-01-05 NOTE — H&P ADULT - ASSESSMENT
A 73 year old female with PMHx of HTN, COPD, fibroids and PSHx of Rt. hemicolectomy (04/2023) presents for abdominal pain, nausea, vomiting and ongoing vaginal bleeding since December 12th. CT scan shows high-grade SBO with a transition point located in the distal small bowel, just proximal to the ileocolic anastomosis.     Plan:  - Admit the patient to surgery   - NPO  - IVF  - Electrolyte repletions  - Serial abdominal exams  - Abdominal exam prior to pain medications  - AM Labs  - GYN consult for the ongoing vaginal bleeding in a post-menopausal woman  - Med reconciliation is PENDING    PENDING ATTENDING APPROVAL    A-Team  [id number] A 73 year old female with PMHx of HTN, COPD, fibroids and PSHx of Rt. hemicolectomy (04/2023) presents for abdominal pain, nausea, vomiting and ongoing vaginal bleeding since December 12th. CT scan shows high-grade SBO with a transition point located in the distal small bowel, just proximal to the ileocolic anastomosis.     Plan:  - Admit the patient to surgery   - NPO  - IVF  - Electrolyte repletions  - Serial abdominal exams  - Abdominal exam prior to pain medications  - AM Labs  - GYN consult for the ongoing vaginal bleeding in a post-menopausal woman  - Med reconciliation is PENDING    PENDING ATTENDING APPROVAL    A-Team  [id number] A 73 year old female with PMHx of HTN, COPD, fibroids and PSHx of Rt. hemicolectomy (04/2023) presents for abdominal pain, nausea, vomiting and ongoing vaginal bleeding since December 12th. CT scan shows high-grade SBO with a transition point located in the distal small bowel, just proximal to the ileocolic anastomosis.     Plan:  - Admit the patient to surgery A-Team  - NPO  - Low threshold for an NGT  - IVF  - Electrolyte repletions  - Serial abdominal exams  - Abdominal exam prior to pain medications  - AM Labs  - GYN consult for the ongoing vaginal bleeding in a post-menopausal woman  - Med reconciliation is PENDING      A-Team  [id number] A 73 year old female with PMHx of HTN, COPD, fibroids and PSHx of Rt. hemicolectomy (04/2023) presents for abdominal pain, nausea, vomiting and ongoing vaginal bleeding since December 12th. CT scan shows high-grade SBO with a transition point located in the distal small bowel, just proximal to the ileocolic anastomosis.     Plan:  - Admit the patient to surgery A-Team  - NPO  - Low threshold for an NGT  - IVF  - Electrolyte repletions  - Serial abdominal exams  - Abdominal exam prior to pain medications  - AM Labs  - GYN consult for the ongoing vaginal bleeding in a post-menopausal woman  - Med reconciliation is PENDING      A-Team  [id number] A 73 year old female with PMHx of HTN, COPD, fibroids and PSHx of Rt. hemicolectomy (04/2023) presents for abdominal pain, nausea, vomiting and ongoing vaginal bleeding since December 12th. CT scan shows high-grade SBO with a transition point located in the distal small bowel, just proximal to the ileocolic anastomosis.     Plan:  - Admit the patient to surgery A-Team  - NPO  - Low threshold for an NGT  - IVF: NS given hyponatremia  - DVT PPx: Heparin given elevated creatinine levels   - Electrolyte repletions  - Serial abdominal exams  - Abdominal exam prior to pain medications  - AM Labs  - GYN consult for the ongoing vaginal bleeding in a post-menopausal woman  - Med reconciliation is PENDING      A-Team  [id number] A 73 year old female with PMHx of HTN, COPD, fibroids and PSHx of Rt. hemicolectomy (04/2023) presents for abdominal pain, nausea, vomiting and ongoing vaginal bleeding since December 12th. CT scan shows high-grade SBO with a transition point located in the distal small bowel, just proximal to the ileocolic anastomosis.     Plan:  - Admit the patient to surgery A-Team  - NPO  - Low threshold for an NGT  - IVF: NS given hyponatremia  - DVT PPx: Heparin given elevated creatinine levels   - Electrolyte repletions  - Serial abdominal exams  - Abdominal exam prior to pain medications  - AM Labs  - GYN consult for the ongoing vaginal bleeding in a post-menopausal woman  - Med reconciliation is PENDING      A-Team  [id number]

## 2024-01-05 NOTE — PATIENT PROFILE ADULT - FALL HARM RISK - HARM RISK INTERVENTIONS
Assistance with ambulation/Assistance OOB with selected safe patient handling equipment/Communicate Risk of Fall with Harm to all staff/Discuss with provider need for PT consult/Monitor gait and stability/Provide patient with walking aids - walker, cane, crutches/Reinforce activity limits and safety measures with patient and family/Tailored Fall Risk Interventions/Visual Cue: Yellow wristband and red socks/Bed in lowest position, wheels locked, appropriate side rails in place/Call bell, personal items and telephone in reach/Instruct patient to call for assistance before getting out of bed or chair/Non-slip footwear when patient is out of bed/[place] to call system/Physically safe environment - no spills, clutter or unnecessary equipment/Purposeful Proactive Rounding/Room/bathroom lighting operational, light cord in reach Assistance with ambulation/Assistance OOB with selected safe patient handling equipment/Communicate Risk of Fall with Harm to all staff/Discuss with provider need for PT consult/Monitor gait and stability/Provide patient with walking aids - walker, cane, crutches/Reinforce activity limits and safety measures with patient and family/Tailored Fall Risk Interventions/Visual Cue: Yellow wristband and red socks/Bed in lowest position, wheels locked, appropriate side rails in place/Call bell, personal items and telephone in reach/Instruct patient to call for assistance before getting out of bed or chair/Non-slip footwear when patient is out of bed/[place] to call system/Physically safe environment - no spills, clutter or unnecessary equipment/Purposeful Proactive Rounding/Room/bathroom lighting operational, light cord in reach

## 2024-01-05 NOTE — ED PROVIDER NOTE - CLINICAL SUMMARY MEDICAL DECISION MAKING FREE TEXT BOX
73-year-old female history of hypertension, COPD not on home O2, hernia repair with mesh, right hemicolectomy, uterine fibroids who presents with shortness of breath and abdominal pain. Concern for intra-abdominal pathology due to exquisite tenderness over abdomen along with surgical history.  Plan for CT abdomen and pelvis with IV contrast.  Plan to treat pain with IV morphine.  Plan for CBC, CMP, coags, type and screen.  Tachypnea is most likely due to abdominal tenderness as patient has clear lung sounds bilaterally.  Plan for reevaluation of tachypnea following plain treatment.  Will likely be an admission.

## 2024-01-05 NOTE — H&P ADULT - NSHPLABSRESULTS_GEN_ALL_CORE
12.6   10.35 )-----------( 357      ( 05 Jan 2024 02:50 )             37.8       01-05    128<L>  |  89<L>  |  68<H>  ----------------------------<  99  3.3<L>   |  19<L>  |  2.31<H>    Ca    9.9      05 Jan 2024 02:50    TPro  7.8  /  Alb  4.0  /  TBili  0.8  /  DBili  x   /  AST  14  /  ALT  14  /  AlkPhos  96  01-05              PT/INR - ( 05 Jan 2024 02:50 )   PT: 11.8 sec;   INR: 1.05 ratio         PTT - ( 05 Jan 2024 02:50 )  PTT:28.1 sec

## 2024-01-05 NOTE — PATIENT PROFILE ADULT - NSPROEXTENSIONSOFSELF_GEN_A_NUR
Detail Level: Simple
Additional Notes: Patient consent was obtained to proceed with the visit and recommended plan of care after discussion of all risks and benefits, including the risks of COVID-19 exposure.
walker

## 2024-01-05 NOTE — PATIENT PROFILE ADULT - NSPROGENBLOODRESTRICT_GEN_A_NUR
CARROLL. Patient is being inactivated from the [facility] Monitoring Service. They have been non compliant in having the necessary lab work done for their anticoagulation therapy and have not responded to our letters or telephone calls.  none

## 2024-01-05 NOTE — PATIENT PROFILE ADULT - FUNCTIONAL ASSESSMENT - BASIC MOBILITY SECTION LABEL
Ongoing SW/CM Assessment/Plan of Care Note     See SW/CM flowsheets for goals and other objective data.    Patient/Family discharge goal (s):  Goal #1: Psychosocial needs assessed  Goal #2: Establish present or future health care decison-maker  Goal #3: Home Care arranged or issues addressed    PT Recommendation:  Recommendation for Discharge: PT WI: (pending sister's ability to continue care for her)    OT Recommendation:       SLP Recommendation:  Recommendations for Discharge: SLP: hattie pending progress    Disposition:       Progress note:   Monday     Received call from [facility] ([place] [phone]) for additional information.  SW faxed updated consult/progress notes and MARS at their request.  Confirmed pt is Covid Negative (last negative 10-01) NO POA on file.  Updated RN that [facility] will likely complete an onsite today         .

## 2024-01-05 NOTE — ED PROVIDER NOTE - PROGRESS NOTE DETAILS
Air fluid levels throughout bowel on CT abdomen pelvis. Pending CTr. Consulted surgery.   Yolanda Burgos MD PGY-1 Spoke to surgery, agreed to see patient.  Yolanda Burgos MD PGY-1 Surgery recommending admission to their service for further management.

## 2024-01-05 NOTE — ED ADULT NURSE NOTE - OBJECTIVE STATEMENT
Patient received in 13, A&Ox3 ambulatory with rollator hx: HTN, COPD (not on home O2) hernia repair with mesh, right hemicolectomy, uterine fibroids c/o SOB and abdominal pain. Since Dec 12th, when she was seen prior to, pt has had abdominal pain with diarrhea. +nausea +lightheadedness Pt states she has decrease PO intake. Pt endorsing COREA and SOB for the past week. Pt also c/o vaginal bleed starting yesterday. Denies saturation of pads. Abdomen soft, tender, nondistended. Pt on 2L NC sating 100%%. Denies CP, fever chills, headache, vision changes, urinary symptoms. left 20g placed, labs drawn and sent.

## 2024-01-06 LAB
ANION GAP SERPL CALC-SCNC: 14 MMOL/L — SIGNIFICANT CHANGE UP (ref 7–14)
ANION GAP SERPL CALC-SCNC: 14 MMOL/L — SIGNIFICANT CHANGE UP (ref 7–14)
BUN SERPL-MCNC: 40 MG/DL — HIGH (ref 7–23)
BUN SERPL-MCNC: 40 MG/DL — HIGH (ref 7–23)
CALCIUM SERPL-MCNC: 8.7 MG/DL — SIGNIFICANT CHANGE UP (ref 8.4–10.5)
CALCIUM SERPL-MCNC: 8.7 MG/DL — SIGNIFICANT CHANGE UP (ref 8.4–10.5)
CHLORIDE SERPL-SCNC: 102 MMOL/L — SIGNIFICANT CHANGE UP (ref 98–107)
CHLORIDE SERPL-SCNC: 102 MMOL/L — SIGNIFICANT CHANGE UP (ref 98–107)
CO2 SERPL-SCNC: 18 MMOL/L — LOW (ref 22–31)
CO2 SERPL-SCNC: 18 MMOL/L — LOW (ref 22–31)
CREAT SERPL-MCNC: 1.22 MG/DL — SIGNIFICANT CHANGE UP (ref 0.5–1.3)
CREAT SERPL-MCNC: 1.22 MG/DL — SIGNIFICANT CHANGE UP (ref 0.5–1.3)
CULTURE RESULTS: SIGNIFICANT CHANGE UP
CULTURE RESULTS: SIGNIFICANT CHANGE UP
EGFR: 47 ML/MIN/1.73M2 — LOW
EGFR: 47 ML/MIN/1.73M2 — LOW
GLUCOSE SERPL-MCNC: 69 MG/DL — LOW (ref 70–99)
GLUCOSE SERPL-MCNC: 69 MG/DL — LOW (ref 70–99)
HCT VFR BLD CALC: 31.7 % — LOW (ref 34.5–45)
HCT VFR BLD CALC: 31.7 % — LOW (ref 34.5–45)
HGB BLD-MCNC: 10.2 G/DL — LOW (ref 11.5–15.5)
HGB BLD-MCNC: 10.2 G/DL — LOW (ref 11.5–15.5)
MAGNESIUM SERPL-MCNC: 2.2 MG/DL — SIGNIFICANT CHANGE UP (ref 1.6–2.6)
MAGNESIUM SERPL-MCNC: 2.2 MG/DL — SIGNIFICANT CHANGE UP (ref 1.6–2.6)
MCHC RBC-ENTMCNC: 25.4 PG — LOW (ref 27–34)
MCHC RBC-ENTMCNC: 25.4 PG — LOW (ref 27–34)
MCHC RBC-ENTMCNC: 32.2 GM/DL — SIGNIFICANT CHANGE UP (ref 32–36)
MCHC RBC-ENTMCNC: 32.2 GM/DL — SIGNIFICANT CHANGE UP (ref 32–36)
MCV RBC AUTO: 78.9 FL — LOW (ref 80–100)
MCV RBC AUTO: 78.9 FL — LOW (ref 80–100)
NRBC # BLD: 0 /100 WBCS — SIGNIFICANT CHANGE UP (ref 0–0)
NRBC # BLD: 0 /100 WBCS — SIGNIFICANT CHANGE UP (ref 0–0)
NRBC # FLD: 0 K/UL — SIGNIFICANT CHANGE UP (ref 0–0)
NRBC # FLD: 0 K/UL — SIGNIFICANT CHANGE UP (ref 0–0)
PHOSPHATE SERPL-MCNC: 3.7 MG/DL — SIGNIFICANT CHANGE UP (ref 2.5–4.5)
PHOSPHATE SERPL-MCNC: 3.7 MG/DL — SIGNIFICANT CHANGE UP (ref 2.5–4.5)
PLATELET # BLD AUTO: 250 K/UL — SIGNIFICANT CHANGE UP (ref 150–400)
PLATELET # BLD AUTO: 250 K/UL — SIGNIFICANT CHANGE UP (ref 150–400)
POTASSIUM SERPL-MCNC: 4.3 MMOL/L — SIGNIFICANT CHANGE UP (ref 3.5–5.3)
POTASSIUM SERPL-MCNC: 4.3 MMOL/L — SIGNIFICANT CHANGE UP (ref 3.5–5.3)
POTASSIUM SERPL-SCNC: 4.3 MMOL/L — SIGNIFICANT CHANGE UP (ref 3.5–5.3)
POTASSIUM SERPL-SCNC: 4.3 MMOL/L — SIGNIFICANT CHANGE UP (ref 3.5–5.3)
RBC # BLD: 4.02 M/UL — SIGNIFICANT CHANGE UP (ref 3.8–5.2)
RBC # BLD: 4.02 M/UL — SIGNIFICANT CHANGE UP (ref 3.8–5.2)
RBC # FLD: 16.8 % — HIGH (ref 10.3–14.5)
RBC # FLD: 16.8 % — HIGH (ref 10.3–14.5)
SODIUM SERPL-SCNC: 134 MMOL/L — LOW (ref 135–145)
SODIUM SERPL-SCNC: 134 MMOL/L — LOW (ref 135–145)
SPECIMEN SOURCE: SIGNIFICANT CHANGE UP
SPECIMEN SOURCE: SIGNIFICANT CHANGE UP
WBC # BLD: 6.05 K/UL — SIGNIFICANT CHANGE UP (ref 3.8–10.5)
WBC # BLD: 6.05 K/UL — SIGNIFICANT CHANGE UP (ref 3.8–10.5)
WBC # FLD AUTO: 6.05 K/UL — SIGNIFICANT CHANGE UP (ref 3.8–10.5)
WBC # FLD AUTO: 6.05 K/UL — SIGNIFICANT CHANGE UP (ref 3.8–10.5)

## 2024-01-06 PROCEDURE — 74018 RADEX ABDOMEN 1 VIEW: CPT | Mod: 26

## 2024-01-06 PROCEDURE — 99232 SBSQ HOSP IP/OBS MODERATE 35: CPT

## 2024-01-06 RX ORDER — ONDANSETRON 8 MG/1
4 TABLET, FILM COATED ORAL ONCE
Refills: 0 | Status: DISCONTINUED | OUTPATIENT
Start: 2024-01-06 | End: 2024-01-06

## 2024-01-06 RX ORDER — HYDROMORPHONE HYDROCHLORIDE 2 MG/ML
0.5 INJECTION INTRAMUSCULAR; INTRAVENOUS; SUBCUTANEOUS ONCE
Refills: 0 | Status: DISCONTINUED | OUTPATIENT
Start: 2024-01-06 | End: 2024-01-06

## 2024-01-06 RX ORDER — ONDANSETRON 8 MG/1
4 TABLET, FILM COATED ORAL ONCE
Refills: 0 | Status: COMPLETED | OUTPATIENT
Start: 2024-01-06 | End: 2024-01-07

## 2024-01-06 RX ORDER — DIATRIZOATE MEGLUMINE 180 MG/ML
90 INJECTION, SOLUTION INTRAVESICAL ONCE
Refills: 0 | Status: COMPLETED | OUTPATIENT
Start: 2024-01-06 | End: 2024-01-06

## 2024-01-06 RX ORDER — ACETAMINOPHEN 500 MG
1000 TABLET ORAL ONCE
Refills: 0 | Status: COMPLETED | OUTPATIENT
Start: 2024-01-06 | End: 2024-01-06

## 2024-01-06 RX ORDER — ONDANSETRON 8 MG/1
4 TABLET, FILM COATED ORAL ONCE
Refills: 0 | Status: COMPLETED | OUTPATIENT
Start: 2024-01-06 | End: 2024-01-06

## 2024-01-06 RX ORDER — DEXTROSE MONOHYDRATE, SODIUM CHLORIDE, AND POTASSIUM CHLORIDE 50; .745; 4.5 G/1000ML; G/1000ML; G/1000ML
1000 INJECTION, SOLUTION INTRAVENOUS
Refills: 0 | Status: DISCONTINUED | OUTPATIENT
Start: 2024-01-06 | End: 2024-01-13

## 2024-01-06 RX ADMIN — DEXTROSE MONOHYDRATE, SODIUM CHLORIDE, AND POTASSIUM CHLORIDE 100 MILLILITER(S): 50; .745; 4.5 INJECTION, SOLUTION INTRAVENOUS at 10:20

## 2024-01-06 RX ADMIN — HYDROMORPHONE HYDROCHLORIDE 0.5 MILLIGRAM(S): 2 INJECTION INTRAMUSCULAR; INTRAVENOUS; SUBCUTANEOUS at 21:26

## 2024-01-06 RX ADMIN — Medication 400 MILLIGRAM(S): at 17:44

## 2024-01-06 RX ADMIN — HEPARIN SODIUM 5000 UNIT(S): 5000 INJECTION INTRAVENOUS; SUBCUTANEOUS at 21:26

## 2024-01-06 RX ADMIN — PANTOPRAZOLE SODIUM 40 MILLIGRAM(S): 20 TABLET, DELAYED RELEASE ORAL at 12:10

## 2024-01-06 RX ADMIN — Medication 400 MILLIGRAM(S): at 06:03

## 2024-01-06 RX ADMIN — ONDANSETRON 4 MILLIGRAM(S): 8 TABLET, FILM COATED ORAL at 18:59

## 2024-01-06 RX ADMIN — HEPARIN SODIUM 5000 UNIT(S): 5000 INJECTION INTRAVENOUS; SUBCUTANEOUS at 13:15

## 2024-01-06 RX ADMIN — Medication 5 MILLIGRAM(S): at 17:43

## 2024-01-06 RX ADMIN — Medication 5 MILLIGRAM(S): at 23:12

## 2024-01-06 RX ADMIN — Medication 1000 MILLIGRAM(S): at 17:45

## 2024-01-06 RX ADMIN — Medication 1000 MILLIGRAM(S): at 06:33

## 2024-01-06 RX ADMIN — DIATRIZOATE MEGLUMINE 90 MILLILITER(S): 180 INJECTION, SOLUTION INTRAVESICAL at 10:13

## 2024-01-06 RX ADMIN — HYDROMORPHONE HYDROCHLORIDE 0.5 MILLIGRAM(S): 2 INJECTION INTRAMUSCULAR; INTRAVENOUS; SUBCUTANEOUS at 21:56

## 2024-01-06 RX ADMIN — HEPARIN SODIUM 5000 UNIT(S): 5000 INJECTION INTRAVENOUS; SUBCUTANEOUS at 05:40

## 2024-01-06 NOTE — PROGRESS NOTE ADULT - ASSESSMENT
73F with PMHx of HTN, COPD, fibroids and PSHx of Rt. hemicolectomy (04/2023) presents for abdominal pain, nausea, vomiting and ongoing vaginal bleeding since December 12th. CT scan shows high-grade SBO with a transition point located in the distal small bowel, just proximal to the ileocolic anastomosis.     Plan:  - NPO  - IVF  - D/c Kitchen today, TOV   - Gastrograffin challenge today  - Abdominal exam prior to pain medications  - GYN consulted for vaginal bleeding    A-Team  [id number] 73F with PMHx of HTN, COPD, fibroids and PSHx of Rt. hemicolectomy (04/2023) presents for abdominal pain, nausea, vomiting and ongoing vaginal bleeding since December 12th. CT scan shows high-grade SBO with a transition point located in the distal small bowel, just proximal to the ileocolic anastomosis.     Plan:  - NPO  - IVF  - D/c Kitchen today, TOV   - Gastrograffin challenge today  - Abdominal exam prior to pain medications  - GYN consulted for vaginal bleeding    A-Team  [id number]

## 2024-01-06 NOTE — PROGRESS NOTE ADULT - SUBJECTIVE AND OBJECTIVE BOX
TEAM [ A ] Surgery Daily Progress Note  =====================================================  INTERVAL: Patient presented yesterday with small bowel obstruction. Was made NPO, IVF, no Ng tube at this time.      SUBJECTIVE: Patient seen and examined at bedside on AM rounds. Patient reports that they're feeling well with little abdominal pain. Patient endorses mild nausea, no vomiting. Patient is not passing gas or stools. Patient is currently NPO without NG tube.     PMH:  PAST MEDICAL & SURGICAL HISTORY:  HTN (hypertension)    ALLERGIES:  No Known Allergies  --------------------------------------------------------------------------------------    VITAL SIGNS:  Vital Signs Last 24 Hrs  T(C): 36.5 (06 Jan 2024 05:48), Max: 36.7 (05 Jan 2024 18:38)  T(F): 97.7 (06 Jan 2024 05:48), Max: 98 (05 Jan 2024 18:38)  HR: 66 (06 Jan 2024 05:48) (66 - 87)  BP: 109/59 (06 Jan 2024 05:48) (93/53 - 135/62)  BP(mean): 81 (05 Jan 2024 18:38) (81 - 81)  RR: 17 (06 Jan 2024 05:48) (16 - 20)  SpO2: 100% (06 Jan 2024 05:48) (100% - 100%)    Parameters below as of 06 Jan 2024 05:48  Patient On (Oxygen Delivery Method): room air      --------------------------------------------------------------------------------------    EXAM    General: NAD, resting in bed comfortably.  Cardiac: regular rate, warm and well perfused  Respiratory: Nonlabored respirations, normal cw expansion.  Abdomen: soft, nontender, nondistended.  Extremities: normal strength, FROM, no deformities    --------------------------------------------------------------------------------------    LABS                        12.6   10.35 )-----------( 357      ( 05 Jan 2024 02:50 )             37.8       01-06    134<L>  |  102  |  40<H>  ----------------------------<  69<L>  4.3   |  18<L>  |  1.22    Ca    8.7      06 Jan 2024 05:37  Phos  3.7     01-06  Mg     2.20     01-06    TPro  7.8  /  Alb  4.0  /  TBili  0.8  /  DBili  x   /  AST  14  /  ALT  14  /  AlkPhos  96  01-05  --------------------------------------------------------------------------------------    INS AND OUTS:  I&O's Detail    05 Jan 2024 07:01  -  06 Jan 2024 07:00  --------------------------------------------------------  IN:    sodium chloride 0.9%: 900 mL  Total IN: 900 mL    OUT:    Indwelling Catheter - Urethral (mL): 1400 mL    IV PiggyBack: 0 mL    Oral Fluid: 0 mL  Total OUT: 1400 mL    Total NET: -500 mL        --------------------------------------------------------------------------------------

## 2024-01-07 LAB
ANION GAP SERPL CALC-SCNC: 15 MMOL/L — HIGH (ref 7–14)
ANION GAP SERPL CALC-SCNC: 15 MMOL/L — HIGH (ref 7–14)
BUN SERPL-MCNC: 28 MG/DL — HIGH (ref 7–23)
BUN SERPL-MCNC: 28 MG/DL — HIGH (ref 7–23)
CALCIUM SERPL-MCNC: 9.2 MG/DL — SIGNIFICANT CHANGE UP (ref 8.4–10.5)
CALCIUM SERPL-MCNC: 9.2 MG/DL — SIGNIFICANT CHANGE UP (ref 8.4–10.5)
CHLORIDE SERPL-SCNC: 99 MMOL/L — SIGNIFICANT CHANGE UP (ref 98–107)
CHLORIDE SERPL-SCNC: 99 MMOL/L — SIGNIFICANT CHANGE UP (ref 98–107)
CO2 SERPL-SCNC: 23 MMOL/L — SIGNIFICANT CHANGE UP (ref 22–31)
CO2 SERPL-SCNC: 23 MMOL/L — SIGNIFICANT CHANGE UP (ref 22–31)
CREAT SERPL-MCNC: 1.32 MG/DL — HIGH (ref 0.5–1.3)
CREAT SERPL-MCNC: 1.32 MG/DL — HIGH (ref 0.5–1.3)
EGFR: 43 ML/MIN/1.73M2 — LOW
EGFR: 43 ML/MIN/1.73M2 — LOW
GLUCOSE SERPL-MCNC: 106 MG/DL — HIGH (ref 70–99)
GLUCOSE SERPL-MCNC: 106 MG/DL — HIGH (ref 70–99)
HCT VFR BLD CALC: 37.1 % — SIGNIFICANT CHANGE UP (ref 34.5–45)
HCT VFR BLD CALC: 37.1 % — SIGNIFICANT CHANGE UP (ref 34.5–45)
HGB BLD-MCNC: 11.8 G/DL — SIGNIFICANT CHANGE UP (ref 11.5–15.5)
HGB BLD-MCNC: 11.8 G/DL — SIGNIFICANT CHANGE UP (ref 11.5–15.5)
MAGNESIUM SERPL-MCNC: 2.2 MG/DL — SIGNIFICANT CHANGE UP (ref 1.6–2.6)
MAGNESIUM SERPL-MCNC: 2.2 MG/DL — SIGNIFICANT CHANGE UP (ref 1.6–2.6)
MCHC RBC-ENTMCNC: 24.9 PG — LOW (ref 27–34)
MCHC RBC-ENTMCNC: 24.9 PG — LOW (ref 27–34)
MCHC RBC-ENTMCNC: 31.8 GM/DL — LOW (ref 32–36)
MCHC RBC-ENTMCNC: 31.8 GM/DL — LOW (ref 32–36)
MCV RBC AUTO: 78.4 FL — LOW (ref 80–100)
MCV RBC AUTO: 78.4 FL — LOW (ref 80–100)
NRBC # BLD: 0 /100 WBCS — SIGNIFICANT CHANGE UP (ref 0–0)
NRBC # BLD: 0 /100 WBCS — SIGNIFICANT CHANGE UP (ref 0–0)
NRBC # FLD: 0 K/UL — SIGNIFICANT CHANGE UP (ref 0–0)
NRBC # FLD: 0 K/UL — SIGNIFICANT CHANGE UP (ref 0–0)
PHOSPHATE SERPL-MCNC: 2.9 MG/DL — SIGNIFICANT CHANGE UP (ref 2.5–4.5)
PHOSPHATE SERPL-MCNC: 2.9 MG/DL — SIGNIFICANT CHANGE UP (ref 2.5–4.5)
PLATELET # BLD AUTO: 294 K/UL — SIGNIFICANT CHANGE UP (ref 150–400)
PLATELET # BLD AUTO: 294 K/UL — SIGNIFICANT CHANGE UP (ref 150–400)
POTASSIUM SERPL-MCNC: 3.7 MMOL/L — SIGNIFICANT CHANGE UP (ref 3.5–5.3)
POTASSIUM SERPL-MCNC: 3.7 MMOL/L — SIGNIFICANT CHANGE UP (ref 3.5–5.3)
POTASSIUM SERPL-SCNC: 3.7 MMOL/L — SIGNIFICANT CHANGE UP (ref 3.5–5.3)
POTASSIUM SERPL-SCNC: 3.7 MMOL/L — SIGNIFICANT CHANGE UP (ref 3.5–5.3)
RBC # BLD: 4.73 M/UL — SIGNIFICANT CHANGE UP (ref 3.8–5.2)
RBC # BLD: 4.73 M/UL — SIGNIFICANT CHANGE UP (ref 3.8–5.2)
RBC # FLD: 17.1 % — HIGH (ref 10.3–14.5)
RBC # FLD: 17.1 % — HIGH (ref 10.3–14.5)
SODIUM SERPL-SCNC: 137 MMOL/L — SIGNIFICANT CHANGE UP (ref 135–145)
SODIUM SERPL-SCNC: 137 MMOL/L — SIGNIFICANT CHANGE UP (ref 135–145)
WBC # BLD: 8.73 K/UL — SIGNIFICANT CHANGE UP (ref 3.8–10.5)
WBC # BLD: 8.73 K/UL — SIGNIFICANT CHANGE UP (ref 3.8–10.5)
WBC # FLD AUTO: 8.73 K/UL — SIGNIFICANT CHANGE UP (ref 3.8–10.5)
WBC # FLD AUTO: 8.73 K/UL — SIGNIFICANT CHANGE UP (ref 3.8–10.5)

## 2024-01-07 PROCEDURE — 71045 X-RAY EXAM CHEST 1 VIEW: CPT | Mod: 26

## 2024-01-07 PROCEDURE — 99232 SBSQ HOSP IP/OBS MODERATE 35: CPT

## 2024-01-07 PROCEDURE — 74018 RADEX ABDOMEN 1 VIEW: CPT | Mod: 26

## 2024-01-07 RX ORDER — ACETAMINOPHEN 500 MG
1000 TABLET ORAL ONCE
Refills: 0 | Status: COMPLETED | OUTPATIENT
Start: 2024-01-07 | End: 2024-01-07

## 2024-01-07 RX ORDER — POTASSIUM CHLORIDE 20 MEQ
10 PACKET (EA) ORAL
Refills: 0 | Status: COMPLETED | OUTPATIENT
Start: 2024-01-07 | End: 2024-01-07

## 2024-01-07 RX ORDER — HYDROMORPHONE HYDROCHLORIDE 2 MG/ML
0.5 INJECTION INTRAMUSCULAR; INTRAVENOUS; SUBCUTANEOUS ONCE
Refills: 0 | Status: DISCONTINUED | OUTPATIENT
Start: 2024-01-07 | End: 2024-01-07

## 2024-01-07 RX ORDER — ACETAMINOPHEN 500 MG
1000 TABLET ORAL ONCE
Refills: 0 | Status: DISCONTINUED | OUTPATIENT
Start: 2024-01-07 | End: 2024-01-07

## 2024-01-07 RX ADMIN — Medication 100 MILLIEQUIVALENT(S): at 14:28

## 2024-01-07 RX ADMIN — HYDROMORPHONE HYDROCHLORIDE 0.5 MILLIGRAM(S): 2 INJECTION INTRAMUSCULAR; INTRAVENOUS; SUBCUTANEOUS at 02:00

## 2024-01-07 RX ADMIN — Medication 1000 MILLIGRAM(S): at 10:40

## 2024-01-07 RX ADMIN — HEPARIN SODIUM 5000 UNIT(S): 5000 INJECTION INTRAVENOUS; SUBCUTANEOUS at 13:43

## 2024-01-07 RX ADMIN — Medication 1000 MILLIGRAM(S): at 02:30

## 2024-01-07 RX ADMIN — HEPARIN SODIUM 5000 UNIT(S): 5000 INJECTION INTRAVENOUS; SUBCUTANEOUS at 22:31

## 2024-01-07 RX ADMIN — ONDANSETRON 4 MILLIGRAM(S): 8 TABLET, FILM COATED ORAL at 01:41

## 2024-01-07 RX ADMIN — HYDROMORPHONE HYDROCHLORIDE 0.5 MILLIGRAM(S): 2 INJECTION INTRAMUSCULAR; INTRAVENOUS; SUBCUTANEOUS at 10:06

## 2024-01-07 RX ADMIN — Medication 63.75 MILLIMOLE(S): at 16:32

## 2024-01-07 RX ADMIN — HEPARIN SODIUM 5000 UNIT(S): 5000 INJECTION INTRAVENOUS; SUBCUTANEOUS at 05:36

## 2024-01-07 RX ADMIN — HYDROMORPHONE HYDROCHLORIDE 0.5 MILLIGRAM(S): 2 INJECTION INTRAMUSCULAR; INTRAVENOUS; SUBCUTANEOUS at 10:45

## 2024-01-07 RX ADMIN — Medication 400 MILLIGRAM(S): at 10:06

## 2024-01-07 RX ADMIN — HYDROMORPHONE HYDROCHLORIDE 0.5 MILLIGRAM(S): 2 INJECTION INTRAMUSCULAR; INTRAVENOUS; SUBCUTANEOUS at 16:45

## 2024-01-07 RX ADMIN — HYDROMORPHONE HYDROCHLORIDE 0.5 MILLIGRAM(S): 2 INJECTION INTRAMUSCULAR; INTRAVENOUS; SUBCUTANEOUS at 16:28

## 2024-01-07 RX ADMIN — DEXTROSE MONOHYDRATE, SODIUM CHLORIDE, AND POTASSIUM CHLORIDE 100 MILLILITER(S): 50; .745; 4.5 INJECTION, SOLUTION INTRAVENOUS at 12:16

## 2024-01-07 RX ADMIN — PANTOPRAZOLE SODIUM 40 MILLIGRAM(S): 20 TABLET, DELAYED RELEASE ORAL at 12:16

## 2024-01-07 RX ADMIN — Medication 400 MILLIGRAM(S): at 02:00

## 2024-01-07 RX ADMIN — HYDROMORPHONE HYDROCHLORIDE 0.5 MILLIGRAM(S): 2 INJECTION INTRAMUSCULAR; INTRAVENOUS; SUBCUTANEOUS at 02:30

## 2024-01-07 RX ADMIN — Medication 5 MILLIGRAM(S): at 12:16

## 2024-01-07 RX ADMIN — Medication 100 MILLIEQUIVALENT(S): at 13:21

## 2024-01-07 RX ADMIN — Medication 5 MILLIGRAM(S): at 05:36

## 2024-01-07 RX ADMIN — Medication 100 MILLIEQUIVALENT(S): at 12:16

## 2024-01-07 NOTE — PROVIDER CONTACT NOTE (OTHER) - SITUATION
Patient vomiting for second time during shift and complaining of 7/10 abdominal pain. Patient has not had anything to eat or drink during the shift. Zofran given for vomiting. no

## 2024-01-07 NOTE — PROGRESS NOTE ADULT - ASSESSMENT
73F with PMHx of HTN, COPD, fibroids and PSHx of Rt. hemicolectomy (04/2023) presents for abdominal pain, nausea, vomiting and ongoing vaginal bleeding since December 12th. CT scan shows high-grade SBO with a transition point located in the distal small bowel, just proximal to the ileocolic anastomosis.     Plan:  - NPO, placed NGT at 1PM per Dr. Salguero. 600cc light brown output after placement. Now clamped. F/u CXR for re-placement to WS.   - IVF  - Passed TOV  - No gastrograffin visualized in rectum  - Abdominal exam prior to pain medications  - Appreciate gynteam input    A-Team  [id number]   73F with PMHx of HTN, COPD, fibroids and PSHx of Rt. hemicolectomy (04/2023) presents for abdominal pain, nausea, vomiting and ongoing vaginal bleeding since December 12th. CT scan shows high-grade SBO with a transition point located in the distal small bowel, just proximal to the ileocolic anastomosis.     Plan:  - NPO, placed NGT at 1PM per Dr. Salguero. 600cc light brown output after placement. Now clamped. F/u CXR for re-placement to WS.   - IVF  - Passed TOV  - No gastrograffin visualized in rectum  - Abdominal exam prior to pain medications  - Appreciate gynteam input    A-Team  [id number]

## 2024-01-07 NOTE — PROGRESS NOTE ADULT - SUBJECTIVE AND OBJECTIVE BOX
TEAM [ A ] Surgery Daily Progress Note  =====================================================    SUBJECTIVE: Patient seen and examined at bedside on AM rounds. Patient reports that they're feeling well. Pt endorsing nausea and vomiting, was backed down to NPO overnight. + Flatus/ + BM. OOB/Amublating as tolerated. Denies fever, chills.    PMH:   PAST MEDICAL & SURGICAL HISTORY:  HTN (hypertension)          ALLERGIES:  No Known Allergies      --------------------------------------------------------------------------------------    MEDICATIONS:    Neurologic Medications  acetaminophen   IVPB .. 1000 milliGRAM(s) IV Intermittent once    Respiratory Medications    Cardiovascular Medications  metoprolol tartrate Injectable 5 milliGRAM(s) IV Push every 6 hours    Gastrointestinal Medications  dextrose 5% + sodium chloride 0.45% with potassium chloride 20 mEq/L 1000 milliLiter(s) IV Continuous <Continuous>  pantoprazole  Injectable 40 milliGRAM(s) IV Push daily  potassium chloride  10 mEq/100 mL IVPB 10 milliEquivalent(s) IV Intermittent every 1 hour  potassium chloride  10 mEq/100 mL IVPB 10 milliEquivalent(s) IV Intermittent every 1 hour  sodium phosphate 15 milliMole(s)/250 mL IVPB 15 milliMole(s) IV Intermittent once    Genitourinary Medications    Hematologic/Oncologic Medications  heparin   Injectable 5000 Unit(s) SubCutaneous every 8 hours    Antimicrobial/Immunologic Medications    Endocrine/Metabolic Medications    Topical/Other Medications    --------------------------------------------------------------------------------------    VITAL SIGNS:    T(C): 36.5 (01-07-24 @ 09:56), Max: 36.6 (01-07-24 @ 02:00)  HR: 84 (01-07-24 @ 09:56) (69 - 84)  BP: 151/83 (01-07-24 @ 09:56) (116/77 - 151/83)  RR: 17 (01-07-24 @ 09:56) (17 - 18)  SpO2: 97% (01-07-24 @ 09:56) (97% - 100%)    --------------------------------------------------------------------------------------  EXAM    General: NAD, resting in bed comfortably.  Cardiac: regular rate, warm and well perfused  Respiratory: Nonlabored respirations, normal cw expansion.  Abdomen: soft, minimally distended, tender in RUQ and LUQ  Extremities: normal strength, FROM, no deformities      --------------------------------------------------------------------------------------    LABS                          11.8   8.73  )-----------( 294      ( 07 Jan 2024 05:29 )             37.1   01-07    137  |  99  |  28<H>  ----------------------------<  106<H>  3.7   |  23  |  1.32<H>    Ca    9.2      07 Jan 2024 05:29  Phos  2.9     01-07  Mg     2.20     01-07          --------------------------------------------------------------------------------------    INS AND OUTS:    01-06-24 @ 07:01  -  01-07-24 @ 07:00  --------------------------------------------------------  IN: 2140 mL / OUT: 850 mL / NET: 1290 mL        --------------------------------------------------------------------------------------

## 2024-01-08 LAB
ANION GAP SERPL CALC-SCNC: 15 MMOL/L — HIGH (ref 7–14)
ANION GAP SERPL CALC-SCNC: 15 MMOL/L — HIGH (ref 7–14)
BUN SERPL-MCNC: 22 MG/DL — SIGNIFICANT CHANGE UP (ref 7–23)
BUN SERPL-MCNC: 22 MG/DL — SIGNIFICANT CHANGE UP (ref 7–23)
CALCIUM SERPL-MCNC: 8.8 MG/DL — SIGNIFICANT CHANGE UP (ref 8.4–10.5)
CALCIUM SERPL-MCNC: 8.8 MG/DL — SIGNIFICANT CHANGE UP (ref 8.4–10.5)
CHLORIDE SERPL-SCNC: 101 MMOL/L — SIGNIFICANT CHANGE UP (ref 98–107)
CHLORIDE SERPL-SCNC: 101 MMOL/L — SIGNIFICANT CHANGE UP (ref 98–107)
CO2 SERPL-SCNC: 22 MMOL/L — SIGNIFICANT CHANGE UP (ref 22–31)
CO2 SERPL-SCNC: 22 MMOL/L — SIGNIFICANT CHANGE UP (ref 22–31)
CREAT SERPL-MCNC: 1.15 MG/DL — SIGNIFICANT CHANGE UP (ref 0.5–1.3)
CREAT SERPL-MCNC: 1.15 MG/DL — SIGNIFICANT CHANGE UP (ref 0.5–1.3)
EGFR: 50 ML/MIN/1.73M2 — LOW
EGFR: 50 ML/MIN/1.73M2 — LOW
GLUCOSE SERPL-MCNC: 125 MG/DL — HIGH (ref 70–99)
GLUCOSE SERPL-MCNC: 125 MG/DL — HIGH (ref 70–99)
HCT VFR BLD CALC: 36.2 % — SIGNIFICANT CHANGE UP (ref 34.5–45)
HCT VFR BLD CALC: 36.2 % — SIGNIFICANT CHANGE UP (ref 34.5–45)
HGB BLD-MCNC: 11.7 G/DL — SIGNIFICANT CHANGE UP (ref 11.5–15.5)
HGB BLD-MCNC: 11.7 G/DL — SIGNIFICANT CHANGE UP (ref 11.5–15.5)
MAGNESIUM SERPL-MCNC: 1.9 MG/DL — SIGNIFICANT CHANGE UP (ref 1.6–2.6)
MAGNESIUM SERPL-MCNC: 1.9 MG/DL — SIGNIFICANT CHANGE UP (ref 1.6–2.6)
MCHC RBC-ENTMCNC: 25.5 PG — LOW (ref 27–34)
MCHC RBC-ENTMCNC: 25.5 PG — LOW (ref 27–34)
MCHC RBC-ENTMCNC: 32.3 GM/DL — SIGNIFICANT CHANGE UP (ref 32–36)
MCHC RBC-ENTMCNC: 32.3 GM/DL — SIGNIFICANT CHANGE UP (ref 32–36)
MCV RBC AUTO: 79 FL — LOW (ref 80–100)
MCV RBC AUTO: 79 FL — LOW (ref 80–100)
NRBC # BLD: 0 /100 WBCS — SIGNIFICANT CHANGE UP (ref 0–0)
NRBC # BLD: 0 /100 WBCS — SIGNIFICANT CHANGE UP (ref 0–0)
NRBC # FLD: 0 K/UL — SIGNIFICANT CHANGE UP (ref 0–0)
NRBC # FLD: 0 K/UL — SIGNIFICANT CHANGE UP (ref 0–0)
PHOSPHATE SERPL-MCNC: 3.3 MG/DL — SIGNIFICANT CHANGE UP (ref 2.5–4.5)
PHOSPHATE SERPL-MCNC: 3.3 MG/DL — SIGNIFICANT CHANGE UP (ref 2.5–4.5)
PLATELET # BLD AUTO: 286 K/UL — SIGNIFICANT CHANGE UP (ref 150–400)
PLATELET # BLD AUTO: 286 K/UL — SIGNIFICANT CHANGE UP (ref 150–400)
POTASSIUM SERPL-MCNC: 4 MMOL/L — SIGNIFICANT CHANGE UP (ref 3.5–5.3)
POTASSIUM SERPL-MCNC: 4 MMOL/L — SIGNIFICANT CHANGE UP (ref 3.5–5.3)
POTASSIUM SERPL-SCNC: 4 MMOL/L — SIGNIFICANT CHANGE UP (ref 3.5–5.3)
POTASSIUM SERPL-SCNC: 4 MMOL/L — SIGNIFICANT CHANGE UP (ref 3.5–5.3)
RBC # BLD: 4.58 M/UL — SIGNIFICANT CHANGE UP (ref 3.8–5.2)
RBC # BLD: 4.58 M/UL — SIGNIFICANT CHANGE UP (ref 3.8–5.2)
RBC # FLD: 17.3 % — HIGH (ref 10.3–14.5)
RBC # FLD: 17.3 % — HIGH (ref 10.3–14.5)
SODIUM SERPL-SCNC: 138 MMOL/L — SIGNIFICANT CHANGE UP (ref 135–145)
SODIUM SERPL-SCNC: 138 MMOL/L — SIGNIFICANT CHANGE UP (ref 135–145)
WBC # BLD: 7.65 K/UL — SIGNIFICANT CHANGE UP (ref 3.8–10.5)
WBC # BLD: 7.65 K/UL — SIGNIFICANT CHANGE UP (ref 3.8–10.5)
WBC # FLD AUTO: 7.65 K/UL — SIGNIFICANT CHANGE UP (ref 3.8–10.5)
WBC # FLD AUTO: 7.65 K/UL — SIGNIFICANT CHANGE UP (ref 3.8–10.5)

## 2024-01-08 PROCEDURE — 74018 RADEX ABDOMEN 1 VIEW: CPT | Mod: 26

## 2024-01-08 RX ORDER — ACETAMINOPHEN 500 MG
1000 TABLET ORAL EVERY 6 HOURS
Refills: 0 | Status: COMPLETED | OUTPATIENT
Start: 2024-01-08 | End: 2024-01-09

## 2024-01-08 RX ORDER — ACETAMINOPHEN 500 MG
1000 TABLET ORAL ONCE
Refills: 0 | Status: COMPLETED | OUTPATIENT
Start: 2024-01-08 | End: 2024-01-08

## 2024-01-08 RX ORDER — HYDROMORPHONE HYDROCHLORIDE 2 MG/ML
0.2 INJECTION INTRAMUSCULAR; INTRAVENOUS; SUBCUTANEOUS ONCE
Refills: 0 | Status: DISCONTINUED | OUTPATIENT
Start: 2024-01-08 | End: 2024-01-08

## 2024-01-08 RX ADMIN — Medication 400 MILLIGRAM(S): at 09:18

## 2024-01-08 RX ADMIN — HEPARIN SODIUM 5000 UNIT(S): 5000 INJECTION INTRAVENOUS; SUBCUTANEOUS at 21:23

## 2024-01-08 RX ADMIN — HYDROMORPHONE HYDROCHLORIDE 0.2 MILLIGRAM(S): 2 INJECTION INTRAMUSCULAR; INTRAVENOUS; SUBCUTANEOUS at 14:20

## 2024-01-08 RX ADMIN — Medication 5 MILLIGRAM(S): at 06:23

## 2024-01-08 RX ADMIN — HEPARIN SODIUM 5000 UNIT(S): 5000 INJECTION INTRAVENOUS; SUBCUTANEOUS at 13:05

## 2024-01-08 RX ADMIN — Medication 400 MILLIGRAM(S): at 21:23

## 2024-01-08 RX ADMIN — PANTOPRAZOLE SODIUM 40 MILLIGRAM(S): 20 TABLET, DELAYED RELEASE ORAL at 11:29

## 2024-01-08 RX ADMIN — DEXTROSE MONOHYDRATE, SODIUM CHLORIDE, AND POTASSIUM CHLORIDE 100 MILLILITER(S): 50; .745; 4.5 INJECTION, SOLUTION INTRAVENOUS at 09:18

## 2024-01-08 RX ADMIN — Medication 1000 MILLIGRAM(S): at 03:11

## 2024-01-08 RX ADMIN — Medication 1000 MILLIGRAM(S): at 17:00

## 2024-01-08 RX ADMIN — Medication 5 MILLIGRAM(S): at 11:29

## 2024-01-08 RX ADMIN — HEPARIN SODIUM 5000 UNIT(S): 5000 INJECTION INTRAVENOUS; SUBCUTANEOUS at 06:23

## 2024-01-08 RX ADMIN — HYDROMORPHONE HYDROCHLORIDE 0.2 MILLIGRAM(S): 2 INJECTION INTRAMUSCULAR; INTRAVENOUS; SUBCUTANEOUS at 14:01

## 2024-01-08 RX ADMIN — Medication 400 MILLIGRAM(S): at 16:32

## 2024-01-08 RX ADMIN — Medication 1000 MILLIGRAM(S): at 09:50

## 2024-01-08 RX ADMIN — DEXTROSE MONOHYDRATE, SODIUM CHLORIDE, AND POTASSIUM CHLORIDE 100 MILLILITER(S): 50; .745; 4.5 INJECTION, SOLUTION INTRAVENOUS at 07:46

## 2024-01-08 RX ADMIN — Medication 1000 MILLIGRAM(S): at 21:45

## 2024-01-08 RX ADMIN — Medication 5 MILLIGRAM(S): at 23:58

## 2024-01-08 RX ADMIN — Medication 400 MILLIGRAM(S): at 02:35

## 2024-01-08 RX ADMIN — Medication 5 MILLIGRAM(S): at 00:14

## 2024-01-08 NOTE — PROGRESS NOTE ADULT - ASSESSMENT
73F with PMHx of HTN, COPD, fibroids and PSHx of Rt. hemicolectomy (04/2023) presents for abdominal pain, nausea, vomiting and ongoing vaginal bleeding since December 12th. CT scan shows high-grade SBO with a transition point located in the distal small bowel, just proximal to the ileocolic anastomosis.     Plan:  - NPO, NGT, IVF  - IVF  - Abdominal exam prior to pain medications  - Appreciate gynteam input    A-Team  [id number] 73F with PMHx of HTN, COPD, fibroids and PSHx of Rt. hemicolectomy (04/2023) presents for abdominal pain, nausea, vomiting and ongoing vaginal bleeding since December 12th. CT scan shows high-grade SBO with a transition point located in the distal small bowel, just proximal to the ileocolic anastomosis.     Plan:  - NPO, NGT, IVF  - IVF  - Abdominal exam prior to pain medications  - Appreciate gynteam input    A-Team  [id number] 73F with PMHx of HTN, COPD, fibroids and PSHx of Rt. hemicolectomy (04/2023) presents for abdominal pain, nausea, vomiting and ongoing vaginal bleeding since December 12th. CT scan shows high-grade SBO with a transition point located in the distal small bowel, just proximal to the ileocolic anastomosis.     Plan:  - NPO, NGT, IVF  - Abdominal exam prior to pain medications  - Appreciate gynteam input    A-Team  [id number] 73F with PMHx of HTN, COPD, fibroids and PSHx of Rt. hemicolectomy (04/2023) presents for abdominal pain, nausea, vomiting and ongoing vaginal bleeding since December 12th. CT scan shows high-grade SBO with a transition point located in the distal small bowel, just proximal to the ileocolic anastomosis.     Plan:  - NPO, NGT, IVF  - Abdominal exam prior to pain medications  - Appreciate gynteam input    A-Team  [id number]

## 2024-01-08 NOTE — PROGRESS NOTE ADULT - SUBJECTIVE AND OBJECTIVE BOX
Morning Surgical Progress Note  Patient is a 73y old  Female who presents with a chief complaint of Small Bowel Obstruction (07 Jan 2024 13:28)    SUBJECTIVE: Patient seen and examined at bedside with surgical team, patient without complaints.     Vital Signs Last 24 Hrs  T(C): 36.9 (08 Jan 2024 06:35), Max: 37.1 (08 Jan 2024 02:03)  T(F): 98.4 (08 Jan 2024 06:35), Max: 98.7 (08 Jan 2024 02:03)  HR: 84 (08 Jan 2024 06:35) (79 - 85)  BP: 132/79 (08 Jan 2024 06:35) (102/58 - 151/83)  RR: 16 (08 Jan 2024 06:35) (16 - 18)  SpO2: 100% (08 Jan 2024 06:35) (97% - 100%)    Parameters below as of 08 Jan 2024 06:35  Patient On (Oxygen Delivery Method): room air    I&O's Detail    06 Jan 2024 07:01  -  07 Jan 2024 07:00  --------------------------------------------------------  IN:    dextrose 5% + sodium chloride 0.45% w/ Additives: 1600 mL    IV PiggyBack: 100 mL    Oral Fluid: 240 mL    sodium chloride 0.9%: 200 mL  Total IN: 2140 mL  OUT:    Indwelling Catheter - Urethral (mL): 250 mL    Voided (mL): 600 mL  Total OUT: 850 mL  Total NET: 1290 mL      07 Jan 2024 07:01  -  08 Jan 2024 06:39  --------------------------------------------------------  IN:    dextrose 5% + sodium chloride 0.45% w/ Additives: 1000 mL    IV PiggyBack: 550 mL  Total IN: 1550 mL  OUT:    Nasogastric/Oral tube (mL): 1650 mL    Voided (mL): 825 mL  Total OUT: 2475 mL  Total NET: -925 mL    Medications  MEDICATIONS  (STANDING):  dextrose 5% + sodium chloride 0.45% with potassium chloride 20 mEq/L 1000 milliLiter(s) (100 mL/Hr) IV Continuous <Continuous>  heparin   Injectable 5000 Unit(s) SubCutaneous every 8 hours  metoprolol tartrate Injectable 5 milliGRAM(s) IV Push every 6 hours  pantoprazole  Injectable 40 milliGRAM(s) IV Push daily  potassium chloride  10 mEq/100 mL IVPB 10 milliEquivalent(s) IV Intermittent every 1 hour    Physical Exam  Constitutional: A&Ox3, NAD  Gastrointestinal:   Extremities: Moving all extremities, no edema  Skin: No Rashes, Hematoma, Ecchymosis       Morning Surgical Progress Note  Patient is a 73y old  Female who presents with a chief complaint of Small Bowel Obstruction (07 Jan 2024 13:28)    INTERVAL: Patient got NG tube yesterday.     SUBJECTIVE: Patient seen and examined at bedside with surgical team on AM rounds. Patient endorsing some nausea and vomiting despite the NG tube. Patient passing stool, no gas.     Vital Signs Last 24 Hrs  T(C): 36.9 (08 Jan 2024 06:35), Max: 37.1 (08 Jan 2024 02:03)  T(F): 98.4 (08 Jan 2024 06:35), Max: 98.7 (08 Jan 2024 02:03)  HR: 84 (08 Jan 2024 06:35) (79 - 85)  BP: 132/79 (08 Jan 2024 06:35) (102/58 - 151/83)  RR: 16 (08 Jan 2024 06:35) (16 - 18)  SpO2: 100% (08 Jan 2024 06:35) (97% - 100%)    Parameters below as of 08 Jan 2024 06:35  Patient On (Oxygen Delivery Method): room air    I&O's Detail    06 Jan 2024 07:01  -  07 Jan 2024 07:00  --------------------------------------------------------  IN:    dextrose 5% + sodium chloride 0.45% w/ Additives: 1600 mL    IV PiggyBack: 100 mL    Oral Fluid: 240 mL    sodium chloride 0.9%: 200 mL  Total IN: 2140 mL  OUT:    Indwelling Catheter - Urethral (mL): 250 mL    Voided (mL): 600 mL  Total OUT: 850 mL  Total NET: 1290 mL      07 Jan 2024 07:01  -  08 Jan 2024 06:39  --------------------------------------------------------  IN:    dextrose 5% + sodium chloride 0.45% w/ Additives: 1000 mL    IV PiggyBack: 550 mL  Total IN: 1550 mL  OUT:    Nasogastric/Oral tube (mL): 1650 mL    Voided (mL): 825 mL  Total OUT: 2475 mL  Total NET: -925 mL    Medications  MEDICATIONS  (STANDING):  dextrose 5% + sodium chloride 0.45% with potassium chloride 20 mEq/L 1000 milliLiter(s) (100 mL/Hr) IV Continuous <Continuous>  heparin   Injectable 5000 Unit(s) SubCutaneous every 8 hours  metoprolol tartrate Injectable 5 milliGRAM(s) IV Push every 6 hours  pantoprazole  Injectable 40 milliGRAM(s) IV Push daily  potassium chloride  10 mEq/100 mL IVPB 10 milliEquivalent(s) IV Intermittent every 1 hour    General: NAD, resting in bed comfortably.  Cardiac: regular rate, warm and well perfused  Respiratory: Nonlabored respirations, normal cw expansion.  Abdomen: soft, minimally distended, tender in RUQ and LUQ, NG tube in place  Extremities: normal strength, FROM, no deformities

## 2024-01-09 ENCOUNTER — APPOINTMENT (OUTPATIENT)
Dept: SURGERY | Facility: CLINIC | Age: 74
End: 2024-01-09

## 2024-01-09 LAB
ANION GAP SERPL CALC-SCNC: 10 MMOL/L — SIGNIFICANT CHANGE UP (ref 7–14)
ANION GAP SERPL CALC-SCNC: 10 MMOL/L — SIGNIFICANT CHANGE UP (ref 7–14)
BUN SERPL-MCNC: 20 MG/DL — SIGNIFICANT CHANGE UP (ref 7–23)
BUN SERPL-MCNC: 20 MG/DL — SIGNIFICANT CHANGE UP (ref 7–23)
CALCIUM SERPL-MCNC: 8.9 MG/DL — SIGNIFICANT CHANGE UP (ref 8.4–10.5)
CALCIUM SERPL-MCNC: 8.9 MG/DL — SIGNIFICANT CHANGE UP (ref 8.4–10.5)
CHLORIDE SERPL-SCNC: 100 MMOL/L — SIGNIFICANT CHANGE UP (ref 98–107)
CHLORIDE SERPL-SCNC: 100 MMOL/L — SIGNIFICANT CHANGE UP (ref 98–107)
CO2 SERPL-SCNC: 28 MMOL/L — SIGNIFICANT CHANGE UP (ref 22–31)
CO2 SERPL-SCNC: 28 MMOL/L — SIGNIFICANT CHANGE UP (ref 22–31)
CREAT SERPL-MCNC: 1.16 MG/DL — SIGNIFICANT CHANGE UP (ref 0.5–1.3)
CREAT SERPL-MCNC: 1.16 MG/DL — SIGNIFICANT CHANGE UP (ref 0.5–1.3)
EGFR: 50 ML/MIN/1.73M2 — LOW
EGFR: 50 ML/MIN/1.73M2 — LOW
GLUCOSE SERPL-MCNC: 106 MG/DL — HIGH (ref 70–99)
GLUCOSE SERPL-MCNC: 106 MG/DL — HIGH (ref 70–99)
HCT VFR BLD CALC: 37.1 % — SIGNIFICANT CHANGE UP (ref 34.5–45)
HCT VFR BLD CALC: 37.1 % — SIGNIFICANT CHANGE UP (ref 34.5–45)
HGB BLD-MCNC: 11.8 G/DL — SIGNIFICANT CHANGE UP (ref 11.5–15.5)
HGB BLD-MCNC: 11.8 G/DL — SIGNIFICANT CHANGE UP (ref 11.5–15.5)
MAGNESIUM SERPL-MCNC: 1.9 MG/DL — SIGNIFICANT CHANGE UP (ref 1.6–2.6)
MAGNESIUM SERPL-MCNC: 1.9 MG/DL — SIGNIFICANT CHANGE UP (ref 1.6–2.6)
MCHC RBC-ENTMCNC: 25.4 PG — LOW (ref 27–34)
MCHC RBC-ENTMCNC: 25.4 PG — LOW (ref 27–34)
MCHC RBC-ENTMCNC: 31.8 GM/DL — LOW (ref 32–36)
MCHC RBC-ENTMCNC: 31.8 GM/DL — LOW (ref 32–36)
MCV RBC AUTO: 80 FL — SIGNIFICANT CHANGE UP (ref 80–100)
MCV RBC AUTO: 80 FL — SIGNIFICANT CHANGE UP (ref 80–100)
NRBC # BLD: 0 /100 WBCS — SIGNIFICANT CHANGE UP (ref 0–0)
NRBC # BLD: 0 /100 WBCS — SIGNIFICANT CHANGE UP (ref 0–0)
NRBC # FLD: 0 K/UL — SIGNIFICANT CHANGE UP (ref 0–0)
NRBC # FLD: 0 K/UL — SIGNIFICANT CHANGE UP (ref 0–0)
PHOSPHATE SERPL-MCNC: 3.9 MG/DL — SIGNIFICANT CHANGE UP (ref 2.5–4.5)
PHOSPHATE SERPL-MCNC: 3.9 MG/DL — SIGNIFICANT CHANGE UP (ref 2.5–4.5)
PLATELET # BLD AUTO: 259 K/UL — SIGNIFICANT CHANGE UP (ref 150–400)
PLATELET # BLD AUTO: 259 K/UL — SIGNIFICANT CHANGE UP (ref 150–400)
POTASSIUM SERPL-MCNC: 3.5 MMOL/L — SIGNIFICANT CHANGE UP (ref 3.5–5.3)
POTASSIUM SERPL-MCNC: 3.5 MMOL/L — SIGNIFICANT CHANGE UP (ref 3.5–5.3)
POTASSIUM SERPL-SCNC: 3.5 MMOL/L — SIGNIFICANT CHANGE UP (ref 3.5–5.3)
POTASSIUM SERPL-SCNC: 3.5 MMOL/L — SIGNIFICANT CHANGE UP (ref 3.5–5.3)
RBC # BLD: 4.64 M/UL — SIGNIFICANT CHANGE UP (ref 3.8–5.2)
RBC # BLD: 4.64 M/UL — SIGNIFICANT CHANGE UP (ref 3.8–5.2)
RBC # FLD: 17.6 % — HIGH (ref 10.3–14.5)
RBC # FLD: 17.6 % — HIGH (ref 10.3–14.5)
SODIUM SERPL-SCNC: 138 MMOL/L — SIGNIFICANT CHANGE UP (ref 135–145)
SODIUM SERPL-SCNC: 138 MMOL/L — SIGNIFICANT CHANGE UP (ref 135–145)
WBC # BLD: 5.27 K/UL — SIGNIFICANT CHANGE UP (ref 3.8–10.5)
WBC # BLD: 5.27 K/UL — SIGNIFICANT CHANGE UP (ref 3.8–10.5)
WBC # FLD AUTO: 5.27 K/UL — SIGNIFICANT CHANGE UP (ref 3.8–10.5)
WBC # FLD AUTO: 5.27 K/UL — SIGNIFICANT CHANGE UP (ref 3.8–10.5)

## 2024-01-09 PROCEDURE — 99231 SBSQ HOSP IP/OBS SF/LOW 25: CPT

## 2024-01-09 PROCEDURE — 74018 RADEX ABDOMEN 1 VIEW: CPT | Mod: 26

## 2024-01-09 RX ORDER — ONDANSETRON 8 MG/1
4 TABLET, FILM COATED ORAL ONCE
Refills: 0 | Status: COMPLETED | OUTPATIENT
Start: 2024-01-09 | End: 2024-01-09

## 2024-01-09 RX ORDER — POTASSIUM CHLORIDE 20 MEQ
10 PACKET (EA) ORAL
Refills: 0 | Status: COMPLETED | OUTPATIENT
Start: 2024-01-09 | End: 2024-01-09

## 2024-01-09 RX ADMIN — PANTOPRAZOLE SODIUM 40 MILLIGRAM(S): 20 TABLET, DELAYED RELEASE ORAL at 11:33

## 2024-01-09 RX ADMIN — HEPARIN SODIUM 5000 UNIT(S): 5000 INJECTION INTRAVENOUS; SUBCUTANEOUS at 21:06

## 2024-01-09 RX ADMIN — DEXTROSE MONOHYDRATE, SODIUM CHLORIDE, AND POTASSIUM CHLORIDE 100 MILLILITER(S): 50; .745; 4.5 INJECTION, SOLUTION INTRAVENOUS at 11:33

## 2024-01-09 RX ADMIN — DEXTROSE MONOHYDRATE, SODIUM CHLORIDE, AND POTASSIUM CHLORIDE 100 MILLILITER(S): 50; .745; 4.5 INJECTION, SOLUTION INTRAVENOUS at 08:48

## 2024-01-09 RX ADMIN — Medication 5 MILLIGRAM(S): at 17:20

## 2024-01-09 RX ADMIN — ONDANSETRON 4 MILLIGRAM(S): 8 TABLET, FILM COATED ORAL at 01:12

## 2024-01-09 RX ADMIN — Medication 100 MILLIEQUIVALENT(S): at 11:58

## 2024-01-09 RX ADMIN — Medication 400 MILLIGRAM(S): at 11:33

## 2024-01-09 RX ADMIN — Medication 100 MILLIEQUIVALENT(S): at 09:26

## 2024-01-09 RX ADMIN — Medication 400 MILLIGRAM(S): at 05:04

## 2024-01-09 RX ADMIN — HEPARIN SODIUM 5000 UNIT(S): 5000 INJECTION INTRAVENOUS; SUBCUTANEOUS at 13:26

## 2024-01-09 RX ADMIN — Medication 5 MILLIGRAM(S): at 05:04

## 2024-01-09 RX ADMIN — Medication 5 MILLIGRAM(S): at 11:33

## 2024-01-09 RX ADMIN — Medication 1000 MILLIGRAM(S): at 05:34

## 2024-01-09 RX ADMIN — Medication 1000 MILLIGRAM(S): at 11:48

## 2024-01-09 RX ADMIN — Medication 100 MILLIEQUIVALENT(S): at 08:45

## 2024-01-09 RX ADMIN — HEPARIN SODIUM 5000 UNIT(S): 5000 INJECTION INTRAVENOUS; SUBCUTANEOUS at 05:04

## 2024-01-09 NOTE — PROGRESS NOTE ADULT - ASSESSMENT
73F with PMHx of HTN, COPD, fibroids and PSHx of Rt. hemicolectomy (04/2023) presents for abdominal pain, nausea, vomiting and ongoing vaginal bleeding since December 12th. CT scan shows high-grade SBO with a transition point located in the distal small bowel, just proximal to the ileocolic anastomosis.     Plan:  - NPO, NGT, IVF  - Abdominal exam prior to pain medications  - Appreciate gynteam input; patient to re-schedule appointment with Dr. Elizondo for outpatient follow up including endometrial biopsy, patient agreeable to plan    A-Team  [id number]     73F with PMHx of HTN, COPD, fibroids and PSHx of Rt. hemicolectomy (04/2023) presents for abdominal pain, nausea, vomiting and ongoing vaginal bleeding since December 12th. CT scan shows high-grade SBO with a transition point located in the distal small bowel, just proximal to the ileocolic anastomosis.     Plan:  - NPO, NGT, IVF  - Abdominal exam prior to pain medications  - Appreciate gynteam input; patient to re-schedule appointment with Dr. Elizondo for outpatient follow up including endometrial biopsy, patient agreeable to plan    A-Team  [id number]     73F with PMHx of HTN, COPD, fibroids and PSHx of Rt. hemicolectomy (04/2023) presents for abdominal pain, nausea, vomiting and ongoing vaginal bleeding since December 12th. CT scan shows high-grade SBO with a transition point located in the distal small bowel, just proximal to the ileocolic anastomosis.     Plan:  - CT scan A/P with IV and PO contrast tomorrow 1/10 (please administer PO contrast 3 hours prior to scan)  - NPO, NGT, IVF  - Abdominal exam prior to pain medications  - Appreciate gynteam input; patient to re-schedule appointment with Dr. Elizondo for outpatient follow up including endometrial biopsy, patient agreeable to plan    A-Team  [id number]     73F with PMHx of HTN, COPD, fibroids and PSHx of Rt. hemicolectomy (04/2023) presents for abdominal pain, nausea, vomiting and ongoing vaginal bleeding since December 12th. CT scan shows high-grade SBO with a transition point located in the distal small bowel, just proximal to the ileocolic anastomosis.     Plan:  - CT scan A/P with IV and PO contrast tomorrow 1/10 (please administer PO contrast 3 hours prior to scan)  - NPO, NGT, IVF  - Abdominal exam prior to pain medications  - Appreciate gynteam input; patient to re-schedule appointment with Dr. Elizondo for outpatient follow up including endometrial biopsy, patient agreeable to plan    A-Team  [id number]     73F with PMHx of HTN, COPD, fibroids and PSHx of Rt. hemicolectomy (04/2023) presents for abdominal pain, nausea, vomiting and ongoing vaginal bleeding since December 12th. CT scan shows high-grade SBO with a transition point located in the distal small bowel, just proximal to the ileocolic anastomosis.     Plan:  - CT scan A/P with IV and PO contrast tomorrow 1/10 (please administer PO contrast via NGT 3 hours prior to scan)  - NPO, NGT, IVF  - Abdominal exam prior to pain medications  - Appreciate gynteam input; patient to re-schedule appointment with Dr. Elizondo for outpatient follow up including endometrial biopsy, patient agreeable to plan    A-Team  [id number]     73F with PMHx of HTN, COPD, fibroids and PSHx of Rt. hemicolectomy (04/2023) presents for abdominal pain, nausea, vomiting and ongoing vaginal bleeding since December 12th. CT scan shows high-grade SBO with a transition point located in the distal small bowel, just proximal to the ileocolic anastomosis.     Plan:  - CT scan A/P with IV and PO contrast tomorrow 1/10 (please administer PO contrast via NGT 3 hours prior to scan)  - NPO, NGT, IVF  - Abdominal exam prior to pain medications  - Appreciate gynteam input; patient to re-schedule appointment with Dr. Elizondo for outpatient follow up including endometrial biopsy, patient agreeable to plan    A-Team  [id number]

## 2024-01-09 NOTE — DIETITIAN INITIAL EVALUATION ADULT - [id number]_A_CORE
01-08-24 @ 07:01  -  01-09-24 @ 07:00  --------------------------------------------------------  OUT:    Nasogastric/Oral tube (mL): 1800 mL  Total OUT: 1800 mL    Total NET: -1800 mL      01-09-24 @ 07:01 - 01-09-24 @ 14:27  --------------------------------------------------------  OUT:    Nasogastric/Oral tube (mL): 1200 mL  Total OUT: 1200 mL    Total NET: -1200 mL

## 2024-01-09 NOTE — DIETITIAN INITIAL EVALUATION ADULT - CONTINUE CURRENT NUTRITION CARE PLAN
1. As clinically able, advance diet as tolerated from clear liquids to Regular diet.   2. Monitor weights, labs, BM's, skin integrity, p.o. intake when initiated.   3. Please document % PO intake in nursing flowsheet.

## 2024-01-09 NOTE — PROGRESS NOTE ADULT - SUBJECTIVE AND OBJECTIVE BOX
TEAM [ A ] Surgery Daily Progress Note  =====================================================    SUBJECTIVE: Patient seen and examined at bedside on AM rounds. Patient reports that they're feeling well. Denies fever, chills, N/V, chest pain, SOB    ALLERGIES:  No Known Allergies      --------------------------------------------------------------------------------------    MEDICATIONS:  acetaminophen   IVPB .. 1000 milliGRAM(s) IV Intermittent every 6 hours  dextrose 5% + sodium chloride 0.45% with potassium chloride 20 mEq/L 1000 milliLiter(s) IV Continuous <Continuous>  heparin   Injectable 5000 Unit(s) SubCutaneous every 8 hours  metoprolol tartrate Injectable 5 milliGRAM(s) IV Push every 6 hours  pantoprazole  Injectable 40 milliGRAM(s) IV Push daily  potassium chloride  10 mEq/100 mL IVPB 10 milliEquivalent(s) IV Intermittent every 1 hour    --------------------------------------------------------------------------------------    VITAL SIGNS:  T(C): 36.9 (01-09-24 @ 05:00), Max: 36.9 (01-08-24 @ 06:35)  HR: 93 (01-09-24 @ 05:00) (77 - 93)  BP: 125/65 (01-09-24 @ 05:00) (105/57 - 141/71)  RR: 18 (01-09-24 @ 05:00) (16 - 18)  SpO2: 97% (01-09-24 @ 05:00) (97% - 100%)  --------------------------------------------------------------------------------------    INS AND OUTS:    01-07-24 @ 07:01 - 01-08-24 @ 07:00  --------------------------------------------------------  IN: 1550 mL / OUT: 2575 mL / NET: -1025 mL    01-08-24 @ 07:01  - 01-09-24 @ 05:55  --------------------------------------------------------  IN: 2600 mL / OUT: 3200 mL / NET: -600 mL      --------------------------------------------------------------------------------------      EXAM    General: NAD, resting in bed comfortably.  Cardiac: regular rate, warm and well perfused  Respiratory: Nonlabored respirations, normal cw expansion.  Abdomen: soft, nontender, nondistended. ___ incision is c/d/i, LEXUS montemayor foley.   Extremities: normal strength, FROM, no deformities    --------------------------------------------------------------------------------------    LABS       TEAM [ A ] Surgery Daily Progress Note  =====================================================    SUBJECTIVE: Patient seen and examined at bedside on AM rounds. Patient reports that they're feeling well. She experienced emesis around the NGT overnight. She reports bowel movements yesterday. Denies chest pain or SOB    ALLERGIES:  No Known Allergies      --------------------------------------------------------------------------------------    MEDICATIONS:  acetaminophen   IVPB .. 1000 milliGRAM(s) IV Intermittent every 6 hours  dextrose 5% + sodium chloride 0.45% with potassium chloride 20 mEq/L 1000 milliLiter(s) IV Continuous <Continuous>  heparin   Injectable 5000 Unit(s) SubCutaneous every 8 hours  metoprolol tartrate Injectable 5 milliGRAM(s) IV Push every 6 hours  pantoprazole  Injectable 40 milliGRAM(s) IV Push daily  potassium chloride  10 mEq/100 mL IVPB 10 milliEquivalent(s) IV Intermittent every 1 hour    --------------------------------------------------------------------------------------    VITAL SIGNS:  T(C): 36.9 (01-09-24 @ 05:00), Max: 36.9 (01-08-24 @ 06:35)  HR: 93 (01-09-24 @ 05:00) (77 - 93)  BP: 125/65 (01-09-24 @ 05:00) (105/57 - 141/71)  RR: 18 (01-09-24 @ 05:00) (16 - 18)  SpO2: 97% (01-09-24 @ 05:00) (97% - 100%)  --------------------------------------------------------------------------------------    INS AND OUTS:    01-07-24 @ 07:01  -  01-08-24 @ 07:00  --------------------------------------------------------  IN: 1550 mL / OUT: 2575 mL / NET: -1025 mL    01-08-24 @ 07:01  -  01-09-24 @ 05:55  --------------------------------------------------------  IN: 2600 mL / OUT: 3200 mL / NET: -600 mL      --------------------------------------------------------------------------------------      EXAM    General: NAD, resting in bed comfortably.  Cardiac: regular rate, warm and well perfused  Respiratory: Nonlabored respirations, normal cw expansion.  Abdomen: soft, nontender, nondistended, NGT to [facility]  Extremities: normal strength, FROM, no deformities    --------------------------------------------------------------------------------------    LABS                        11.7   7.65  )-----------( 286      ( 08 Jan 2024 05:51 )             36.2   01-08    138  |  101  |  22  ----------------------------<  125<H>  4.0   |  22  |  1.15    Ca    8.8      08 Jan 2024 05:51  Phos  3.3     01-08  Mg     1.90     01-08         TEAM [ A ] Surgery Daily Progress Note  =====================================================    SUBJECTIVE: Patient seen and examined at bedside on AM rounds. Patient reports that they're feeling well. She experienced emesis around the NGT overnight. She reports bowel movements yesterday. Denies chest pain or SOB    ALLERGIES:  No Known Allergies      --------------------------------------------------------------------------------------    MEDICATIONS:  acetaminophen   IVPB .. 1000 milliGRAM(s) IV Intermittent every 6 hours  dextrose 5% + sodium chloride 0.45% with potassium chloride 20 mEq/L 1000 milliLiter(s) IV Continuous <Continuous>  heparin   Injectable 5000 Unit(s) SubCutaneous every 8 hours  metoprolol tartrate Injectable 5 milliGRAM(s) IV Push every 6 hours  pantoprazole  Injectable 40 milliGRAM(s) IV Push daily  potassium chloride  10 mEq/100 mL IVPB 10 milliEquivalent(s) IV Intermittent every 1 hour    --------------------------------------------------------------------------------------    VITAL SIGNS:  T(C): 36.9 (01-09-24 @ 05:00), Max: 36.9 (01-08-24 @ 06:35)  HR: 93 (01-09-24 @ 05:00) (77 - 93)  BP: 125/65 (01-09-24 @ 05:00) (105/57 - 141/71)  RR: 18 (01-09-24 @ 05:00) (16 - 18)  SpO2: 97% (01-09-24 @ 05:00) (97% - 100%)  --------------------------------------------------------------------------------------    INS AND OUTS:    01-07-24 @ 07:01  -  01-08-24 @ 07:00  --------------------------------------------------------  IN: 1550 mL / OUT: 2575 mL / NET: -1025 mL    01-08-24 @ 07:01  -  01-09-24 @ 05:55  --------------------------------------------------------  IN: 2600 mL / OUT: 3200 mL / NET: -600 mL      --------------------------------------------------------------------------------------      EXAM    General: NAD, resting in bed comfortably.  Cardiac: regular rate, warm and well perfused  Respiratory: Nonlabored respirations, normal cw expansion.  Abdomen: soft, nontender, nondistended, NGT to [facility]  Extremities: normal strength, FROM, no deformities    --------------------------------------------------------------------------------------    LABS                        11.7   7.65  )-----------( 286      ( 08 Jan 2024 05:51 )             36.2   01-08    138  |  101  |  22  ----------------------------<  125<H>  4.0   |  22  |  1.15    Ca    8.8      08 Jan 2024 05:51  Phos  3.3     01-08  Mg     1.90     01-08

## 2024-01-09 NOTE — DIETITIAN INITIAL EVALUATION ADULT - OTHER INFO
Patient remains NPO at this time. NGT with LCS. Patient denies any nausea/vomiting/diarrhea/constipation or difficulty chewing and swallowing. Denies any weight changes, 100.5kg/221.6lbs per last RD note 4/2023. Current weight 104.3kg/230lbs 1/5. Diet advancement process discussed.

## 2024-01-09 NOTE — DIETITIAN INITIAL EVALUATION ADULT - PERTINENT LABORATORY DATA
01-09    138  |  100  |  20  ----------------------------<  106<H>  3.5   |  28  |  1.16    Ca    8.9      09 Jan 2024 06:00  Phos  3.9     01-09  Mg     1.90     01-09

## 2024-01-09 NOTE — DIETITIAN INITIAL EVALUATION ADULT - PERTINENT MEDS FT
MEDICATIONS  (STANDING):  dextrose 5% + sodium chloride 0.45% with potassium chloride 20 mEq/L 1000 milliLiter(s) (100 mL/Hr) IV Continuous <Continuous>  heparin   Injectable 5000 Unit(s) SubCutaneous every 8 hours  metoprolol tartrate Injectable 5 milliGRAM(s) IV Push every 6 hours  pantoprazole  Injectable 40 milliGRAM(s) IV Push daily    MEDICATIONS  (PRN):

## 2024-01-10 DIAGNOSIS — K56.609 UNSPECIFIED INTESTINAL OBSTRUCTION, UNSPECIFIED AS TO PARTIAL VERSUS COMPLETE OBSTRUCTION: ICD-10-CM

## 2024-01-10 LAB
ANION GAP SERPL CALC-SCNC: 11 MMOL/L — SIGNIFICANT CHANGE UP (ref 7–14)
ANION GAP SERPL CALC-SCNC: 11 MMOL/L — SIGNIFICANT CHANGE UP (ref 7–14)
BUN SERPL-MCNC: 20 MG/DL — SIGNIFICANT CHANGE UP (ref 7–23)
BUN SERPL-MCNC: 20 MG/DL — SIGNIFICANT CHANGE UP (ref 7–23)
CALCIUM SERPL-MCNC: 9.1 MG/DL — SIGNIFICANT CHANGE UP (ref 8.4–10.5)
CALCIUM SERPL-MCNC: 9.1 MG/DL — SIGNIFICANT CHANGE UP (ref 8.4–10.5)
CHLORIDE SERPL-SCNC: 100 MMOL/L — SIGNIFICANT CHANGE UP (ref 98–107)
CHLORIDE SERPL-SCNC: 100 MMOL/L — SIGNIFICANT CHANGE UP (ref 98–107)
CO2 SERPL-SCNC: 27 MMOL/L — SIGNIFICANT CHANGE UP (ref 22–31)
CO2 SERPL-SCNC: 27 MMOL/L — SIGNIFICANT CHANGE UP (ref 22–31)
CREAT SERPL-MCNC: 1.16 MG/DL — SIGNIFICANT CHANGE UP (ref 0.5–1.3)
CREAT SERPL-MCNC: 1.16 MG/DL — SIGNIFICANT CHANGE UP (ref 0.5–1.3)
EGFR: 50 ML/MIN/1.73M2 — LOW
EGFR: 50 ML/MIN/1.73M2 — LOW
GLUCOSE SERPL-MCNC: 120 MG/DL — HIGH (ref 70–99)
GLUCOSE SERPL-MCNC: 120 MG/DL — HIGH (ref 70–99)
HCT VFR BLD CALC: 37.6 % — SIGNIFICANT CHANGE UP (ref 34.5–45)
HCT VFR BLD CALC: 37.6 % — SIGNIFICANT CHANGE UP (ref 34.5–45)
HGB BLD-MCNC: 12.1 G/DL — SIGNIFICANT CHANGE UP (ref 11.5–15.5)
HGB BLD-MCNC: 12.1 G/DL — SIGNIFICANT CHANGE UP (ref 11.5–15.5)
MAGNESIUM SERPL-MCNC: 1.8 MG/DL — SIGNIFICANT CHANGE UP (ref 1.6–2.6)
MAGNESIUM SERPL-MCNC: 1.8 MG/DL — SIGNIFICANT CHANGE UP (ref 1.6–2.6)
MCHC RBC-ENTMCNC: 25.4 PG — LOW (ref 27–34)
MCHC RBC-ENTMCNC: 25.4 PG — LOW (ref 27–34)
MCHC RBC-ENTMCNC: 32.2 GM/DL — SIGNIFICANT CHANGE UP (ref 32–36)
MCHC RBC-ENTMCNC: 32.2 GM/DL — SIGNIFICANT CHANGE UP (ref 32–36)
MCV RBC AUTO: 78.8 FL — LOW (ref 80–100)
MCV RBC AUTO: 78.8 FL — LOW (ref 80–100)
NRBC # BLD: 0 /100 WBCS — SIGNIFICANT CHANGE UP (ref 0–0)
NRBC # BLD: 0 /100 WBCS — SIGNIFICANT CHANGE UP (ref 0–0)
NRBC # FLD: 0 K/UL — SIGNIFICANT CHANGE UP (ref 0–0)
NRBC # FLD: 0 K/UL — SIGNIFICANT CHANGE UP (ref 0–0)
PHOSPHATE SERPL-MCNC: 3.1 MG/DL — SIGNIFICANT CHANGE UP (ref 2.5–4.5)
PHOSPHATE SERPL-MCNC: 3.1 MG/DL — SIGNIFICANT CHANGE UP (ref 2.5–4.5)
PLATELET # BLD AUTO: 269 K/UL — SIGNIFICANT CHANGE UP (ref 150–400)
PLATELET # BLD AUTO: 269 K/UL — SIGNIFICANT CHANGE UP (ref 150–400)
POTASSIUM SERPL-MCNC: 3.6 MMOL/L — SIGNIFICANT CHANGE UP (ref 3.5–5.3)
POTASSIUM SERPL-MCNC: 3.6 MMOL/L — SIGNIFICANT CHANGE UP (ref 3.5–5.3)
POTASSIUM SERPL-SCNC: 3.6 MMOL/L — SIGNIFICANT CHANGE UP (ref 3.5–5.3)
POTASSIUM SERPL-SCNC: 3.6 MMOL/L — SIGNIFICANT CHANGE UP (ref 3.5–5.3)
RBC # BLD: 4.77 M/UL — SIGNIFICANT CHANGE UP (ref 3.8–5.2)
RBC # BLD: 4.77 M/UL — SIGNIFICANT CHANGE UP (ref 3.8–5.2)
RBC # FLD: 17.3 % — HIGH (ref 10.3–14.5)
RBC # FLD: 17.3 % — HIGH (ref 10.3–14.5)
SODIUM SERPL-SCNC: 138 MMOL/L — SIGNIFICANT CHANGE UP (ref 135–145)
SODIUM SERPL-SCNC: 138 MMOL/L — SIGNIFICANT CHANGE UP (ref 135–145)
WBC # BLD: 7.08 K/UL — SIGNIFICANT CHANGE UP (ref 3.8–10.5)
WBC # BLD: 7.08 K/UL — SIGNIFICANT CHANGE UP (ref 3.8–10.5)
WBC # FLD AUTO: 7.08 K/UL — SIGNIFICANT CHANGE UP (ref 3.8–10.5)
WBC # FLD AUTO: 7.08 K/UL — SIGNIFICANT CHANGE UP (ref 3.8–10.5)

## 2024-01-10 PROCEDURE — 74177 CT ABD & PELVIS W/CONTRAST: CPT | Mod: 26

## 2024-01-10 PROCEDURE — 99232 SBSQ HOSP IP/OBS MODERATE 35: CPT

## 2024-01-10 PROCEDURE — 93010 ELECTROCARDIOGRAM REPORT: CPT

## 2024-01-10 RX ORDER — POTASSIUM CHLORIDE 20 MEQ
10 PACKET (EA) ORAL
Refills: 0 | Status: COMPLETED | OUTPATIENT
Start: 2024-01-10 | End: 2024-01-10

## 2024-01-10 RX ORDER — MAGNESIUM SULFATE 500 MG/ML
2 VIAL (ML) INJECTION ONCE
Refills: 0 | Status: COMPLETED | OUTPATIENT
Start: 2024-01-10 | End: 2024-01-10

## 2024-01-10 RX ORDER — ONDANSETRON 8 MG/1
4 TABLET, FILM COATED ORAL ONCE
Refills: 0 | Status: COMPLETED | OUTPATIENT
Start: 2024-01-10 | End: 2024-01-10

## 2024-01-10 RX ORDER — SODIUM CHLORIDE 9 MG/ML
1500 INJECTION, SOLUTION INTRAVENOUS ONCE
Refills: 0 | Status: COMPLETED | OUTPATIENT
Start: 2024-01-10 | End: 2024-01-10

## 2024-01-10 RX ADMIN — HEPARIN SODIUM 5000 UNIT(S): 5000 INJECTION INTRAVENOUS; SUBCUTANEOUS at 21:21

## 2024-01-10 RX ADMIN — PANTOPRAZOLE SODIUM 40 MILLIGRAM(S): 20 TABLET, DELAYED RELEASE ORAL at 12:04

## 2024-01-10 RX ADMIN — Medication 25 GRAM(S): at 14:33

## 2024-01-10 RX ADMIN — ONDANSETRON 4 MILLIGRAM(S): 8 TABLET, FILM COATED ORAL at 13:07

## 2024-01-10 RX ADMIN — DEXTROSE MONOHYDRATE, SODIUM CHLORIDE, AND POTASSIUM CHLORIDE 100 MILLILITER(S): 50; .745; 4.5 INJECTION, SOLUTION INTRAVENOUS at 19:25

## 2024-01-10 RX ADMIN — Medication 5 MILLIGRAM(S): at 19:24

## 2024-01-10 RX ADMIN — Medication 100 MILLIEQUIVALENT(S): at 10:28

## 2024-01-10 RX ADMIN — SODIUM CHLORIDE 1000 MILLILITER(S): 9 INJECTION, SOLUTION INTRAVENOUS at 09:02

## 2024-01-10 RX ADMIN — Medication 100 MILLIEQUIVALENT(S): at 12:02

## 2024-01-10 RX ADMIN — Medication 5 MILLIGRAM(S): at 05:40

## 2024-01-10 RX ADMIN — Medication 5 MILLIGRAM(S): at 12:03

## 2024-01-10 RX ADMIN — HEPARIN SODIUM 5000 UNIT(S): 5000 INJECTION INTRAVENOUS; SUBCUTANEOUS at 05:40

## 2024-01-10 RX ADMIN — Medication 100 MILLIEQUIVALENT(S): at 09:01

## 2024-01-10 NOTE — PROGRESS NOTE ADULT - SUBJECTIVE AND OBJECTIVE BOX
A Team General Surgery Progress Note    S: Pt seen and examined with team on morning rounds. Patient denies Nausea/emesis. +Flatus/+BM. +voiding. Denies Pain. No CP/Palpitations/SOB/HA/Dizziness.       Vital Signs Last 24 Hrs  T(C): 36.7 (10 Eros 2024 05:00), Max: 36.8 (09 Jan 2024 22:00)  T(F): 98.1 (10 Eros 2024 05:00), Max: 98.2 (09 Jan 2024 22:00)  HR: 85 (10 Eros 2024 05:00) (64 - 95)  BP: 125/76 (10 Eros 2024 05:00) (100/62 - 135/74)  BP(mean): --  RR: 18 (10 Eros 2024 05:00) (16 - 18)  SpO2: 96% (10 Eros 2024 05:00) (96% - 100%)    Parameters below as of 10 Eros 2024 05:00  Patient On (Oxygen Delivery Method): room air        I&O's Summary    09 Jan 2024 07:01  -  10 Eros 2024 07:00  --------------------------------------------------------  IN: 2840 mL / OUT: 4500 mL / NET: -1660 mL      I&O's Detail    09 Jan 2024 07:01  -  10 Eros 2024 07:00  --------------------------------------------------------  IN:    dextrose 5% + sodium chloride 0.45% w/ Additives: 2400 mL    Enteral Tube Flush: 40 mL    IV PiggyBack: 400 mL  Total IN: 2840 mL    OUT:    Nasogastric/Oral tube (mL): 3300 mL    Oral Fluid: 0 mL    Voided (mL): 1200 mL  Total OUT: 4500 mL    Total NET: -1660 mL          General Appearance: Appears well, NAD  Chest: Breathing comfortably on RA.    CV: S1, S2 @ 85  Abdomen: Soft, nondistended, + minimal tenderness to palpation epigastric. No rebound or guarding.  Extremities: Moves all extremities.    MEDICATIONS  (STANDING):  dextrose 5% + sodium chloride 0.45% with potassium chloride 20 mEq/L 1000 milliLiter(s) (100 mL/Hr) IV Continuous <Continuous>  heparin   Injectable 5000 Unit(s) SubCutaneous every 8 hours  metoprolol tartrate Injectable 5 milliGRAM(s) IV Push every 6 hours  pantoprazole  Injectable 40 milliGRAM(s) IV Push daily    MEDICATIONS  (PRN):      LABS:                        12.1   7.08  )-----------( 269      ( 10 Eros 2024 06:44 )             37.6     01-09    138  |  100  |  20  ----------------------------<  106<H>  3.5   |  28  |  1.16    Ca    8.9      09 Jan 2024 06:00  Phos  3.9     01-09  Mg     1.90     01-09        Urinalysis Basic - ( 09 Jan 2024 06:00 )    Color: x / Appearance: x / SG: x / pH: x  Gluc: 106 mg/dL / Ketone: x  / Bili: x / Urobili: x   Blood: x / Protein: x / Nitrite: x   Leuk Esterase: x / RBC: x / WBC x   Sq Epi: x / Non Sq Epi: x / Bacteria: x

## 2024-01-10 NOTE — PROGRESS NOTE ADULT - ASSESSMENT
73F with PMHx of HTN, COPD, fibroids and PSHx of Rt. hemicolectomy (04/2023) presents for abdominal pain, nausea, vomiting and ongoing vaginal bleeding since December 12th. CT scan shows high-grade SBO with a transition point located in the distal small bowel, just proximal to the ileocolic anastomosis.     Plan:  - CT scan A/P with IV and PO contrast today (please administer PO contrast via NGT 3 hours prior to scan)  - Replace NGT output 1/2:1cc  - NPO, NGT, IVF  - Abdominal exam prior to pain medications  - OOB ambulate with assistance  - strict I's/O's    - Appreciate gynteam input; patient to re-schedule appointment with Dr. Elizondo for outpatient follow up including endometrial biopsy, patient agreeable to plan    A-Team  [id number] 73F with PMHx of HTN, COPD, fibroids and PSHx of Rt. hemicolectomy (04/2023) presents for abdominal pain, nausea, vomiting and ongoing vaginal bleeding since December 12th. CT scan shows high-grade SBO with a transition point located in the distal small bowel, just proximal to the ileocolic anastomosis.     Plan:  - CT scan A/P with IV and PO contrast today (please administer PO contrast via NGT 3 hours prior to scan)  - Replace NGT output 1/2:1cc  - NPO, NGT, IVF  - Abdominal exam prior to pain medications  - OOB ambulate with assistance  - strict I's/O's    - Appreciate gynteam input; patient to re-schedule appointment with Dr. Elizondo for outpatient follow up including endometrial biopsy, patient agreeable to plan    A-Team  [id number]

## 2024-01-11 ENCOUNTER — TRANSCRIPTION ENCOUNTER (OUTPATIENT)
Age: 74
End: 2024-01-11

## 2024-01-11 LAB
ANION GAP SERPL CALC-SCNC: 11 MMOL/L — SIGNIFICANT CHANGE UP (ref 7–14)
ANION GAP SERPL CALC-SCNC: 11 MMOL/L — SIGNIFICANT CHANGE UP (ref 7–14)
APPEARANCE UR: ABNORMAL
APTT BLD: 28.4 SEC — SIGNIFICANT CHANGE UP (ref 24.5–35.6)
APTT BLD: 28.4 SEC — SIGNIFICANT CHANGE UP (ref 24.5–35.6)
BACTERIA # UR AUTO: ABNORMAL /HPF
BILIRUB UR-MCNC: NEGATIVE — SIGNIFICANT CHANGE UP
BUN SERPL-MCNC: 18 MG/DL — SIGNIFICANT CHANGE UP (ref 7–23)
BUN SERPL-MCNC: 18 MG/DL — SIGNIFICANT CHANGE UP (ref 7–23)
CALCIUM SERPL-MCNC: 8.7 MG/DL — SIGNIFICANT CHANGE UP (ref 8.4–10.5)
CALCIUM SERPL-MCNC: 8.7 MG/DL — SIGNIFICANT CHANGE UP (ref 8.4–10.5)
CAST: 2 /LPF — SIGNIFICANT CHANGE UP (ref 0–4)
CEA SERPL-MCNC: 15.9 NG/ML — HIGH (ref 1–3.8)
CEA SERPL-MCNC: 15.9 NG/ML — HIGH (ref 1–3.8)
CHLORIDE SERPL-SCNC: 100 MMOL/L — SIGNIFICANT CHANGE UP (ref 98–107)
CHLORIDE SERPL-SCNC: 100 MMOL/L — SIGNIFICANT CHANGE UP (ref 98–107)
CO2 SERPL-SCNC: 29 MMOL/L — SIGNIFICANT CHANGE UP (ref 22–31)
CO2 SERPL-SCNC: 29 MMOL/L — SIGNIFICANT CHANGE UP (ref 22–31)
COLOR SPEC: SIGNIFICANT CHANGE UP
COLOR SPEC: SIGNIFICANT CHANGE UP
COLOR SPEC: YELLOW — SIGNIFICANT CHANGE UP
COLOR SPEC: YELLOW — SIGNIFICANT CHANGE UP
CREAT SERPL-MCNC: 1.26 MG/DL — SIGNIFICANT CHANGE UP (ref 0.5–1.3)
CREAT SERPL-MCNC: 1.26 MG/DL — SIGNIFICANT CHANGE UP (ref 0.5–1.3)
DIFF PNL FLD: ABNORMAL
DIFF PNL FLD: ABNORMAL
DIFF PNL FLD: NEGATIVE — SIGNIFICANT CHANGE UP
DIFF PNL FLD: NEGATIVE — SIGNIFICANT CHANGE UP
EGFR: 45 ML/MIN/1.73M2 — LOW
EGFR: 45 ML/MIN/1.73M2 — LOW
FINE GRAN CASTS #/AREA URNS AUTO: PRESENT
FINE GRAN CASTS #/AREA URNS AUTO: PRESENT
GLUCOSE SERPL-MCNC: 113 MG/DL — HIGH (ref 70–99)
GLUCOSE SERPL-MCNC: 113 MG/DL — HIGH (ref 70–99)
GLUCOSE UR QL: NEGATIVE MG/DL — SIGNIFICANT CHANGE UP
HCT VFR BLD CALC: 35.2 % — SIGNIFICANT CHANGE UP (ref 34.5–45)
HCT VFR BLD CALC: 35.2 % — SIGNIFICANT CHANGE UP (ref 34.5–45)
HGB BLD-MCNC: 11.3 G/DL — LOW (ref 11.5–15.5)
HGB BLD-MCNC: 11.3 G/DL — LOW (ref 11.5–15.5)
INR BLD: 1.09 RATIO — SIGNIFICANT CHANGE UP (ref 0.85–1.18)
INR BLD: 1.09 RATIO — SIGNIFICANT CHANGE UP (ref 0.85–1.18)
KETONES UR-MCNC: ABNORMAL MG/DL
KETONES UR-MCNC: ABNORMAL MG/DL
KETONES UR-MCNC: NEGATIVE MG/DL — SIGNIFICANT CHANGE UP
KETONES UR-MCNC: NEGATIVE MG/DL — SIGNIFICANT CHANGE UP
LEUKOCYTE ESTERASE UR-ACNC: ABNORMAL
MAGNESIUM SERPL-MCNC: 2.2 MG/DL — SIGNIFICANT CHANGE UP (ref 1.6–2.6)
MAGNESIUM SERPL-MCNC: 2.2 MG/DL — SIGNIFICANT CHANGE UP (ref 1.6–2.6)
MCHC RBC-ENTMCNC: 25.6 PG — LOW (ref 27–34)
MCHC RBC-ENTMCNC: 25.6 PG — LOW (ref 27–34)
MCHC RBC-ENTMCNC: 32.1 GM/DL — SIGNIFICANT CHANGE UP (ref 32–36)
MCHC RBC-ENTMCNC: 32.1 GM/DL — SIGNIFICANT CHANGE UP (ref 32–36)
MCV RBC AUTO: 79.8 FL — LOW (ref 80–100)
MCV RBC AUTO: 79.8 FL — LOW (ref 80–100)
NITRITE UR-MCNC: NEGATIVE — SIGNIFICANT CHANGE UP
NRBC # BLD: 0 /100 WBCS — SIGNIFICANT CHANGE UP (ref 0–0)
NRBC # BLD: 0 /100 WBCS — SIGNIFICANT CHANGE UP (ref 0–0)
NRBC # FLD: 0 K/UL — SIGNIFICANT CHANGE UP (ref 0–0)
NRBC # FLD: 0 K/UL — SIGNIFICANT CHANGE UP (ref 0–0)
PH UR: 8.5 (ref 5–8)
PH UR: 8.5 (ref 5–8)
PH UR: >=9 (ref 5–8)
PH UR: >=9 (ref 5–8)
PHOSPHATE SERPL-MCNC: 4 MG/DL — SIGNIFICANT CHANGE UP (ref 2.5–4.5)
PHOSPHATE SERPL-MCNC: 4 MG/DL — SIGNIFICANT CHANGE UP (ref 2.5–4.5)
PLATELET # BLD AUTO: 233 K/UL — SIGNIFICANT CHANGE UP (ref 150–400)
PLATELET # BLD AUTO: 233 K/UL — SIGNIFICANT CHANGE UP (ref 150–400)
POTASSIUM SERPL-MCNC: 3.8 MMOL/L — SIGNIFICANT CHANGE UP (ref 3.5–5.3)
POTASSIUM SERPL-MCNC: 3.8 MMOL/L — SIGNIFICANT CHANGE UP (ref 3.5–5.3)
POTASSIUM SERPL-SCNC: 3.8 MMOL/L — SIGNIFICANT CHANGE UP (ref 3.5–5.3)
POTASSIUM SERPL-SCNC: 3.8 MMOL/L — SIGNIFICANT CHANGE UP (ref 3.5–5.3)
PROT UR-MCNC: 100 MG/DL
PROTHROM AB SERPL-ACNC: 12.3 SEC — SIGNIFICANT CHANGE UP (ref 9.5–13)
PROTHROM AB SERPL-ACNC: 12.3 SEC — SIGNIFICANT CHANGE UP (ref 9.5–13)
RBC # BLD: 4.41 M/UL — SIGNIFICANT CHANGE UP (ref 3.8–5.2)
RBC # BLD: 4.41 M/UL — SIGNIFICANT CHANGE UP (ref 3.8–5.2)
RBC # FLD: 17.7 % — HIGH (ref 10.3–14.5)
RBC # FLD: 17.7 % — HIGH (ref 10.3–14.5)
RBC CASTS # UR COMP ASSIST: 7 /HPF — SIGNIFICANT CHANGE UP (ref 0–4)
RBC CASTS # UR COMP ASSIST: 7 /HPF — SIGNIFICANT CHANGE UP (ref 0–4)
RBC CASTS # UR COMP ASSIST: 91 /HPF — HIGH (ref 0–4)
RBC CASTS # UR COMP ASSIST: 91 /HPF — HIGH (ref 0–4)
REVIEW: SIGNIFICANT CHANGE UP
SODIUM SERPL-SCNC: 140 MMOL/L — SIGNIFICANT CHANGE UP (ref 135–145)
SODIUM SERPL-SCNC: 140 MMOL/L — SIGNIFICANT CHANGE UP (ref 135–145)
SP GR SPEC: 1.02 — SIGNIFICANT CHANGE UP (ref 1–1.03)
SP GR SPEC: 1.02 — SIGNIFICANT CHANGE UP (ref 1–1.03)
SP GR SPEC: 1.04 — HIGH (ref 1–1.03)
SP GR SPEC: 1.04 — HIGH (ref 1–1.03)
SQUAMOUS # UR AUTO: 12 /HPF — HIGH (ref 0–5)
SQUAMOUS # UR AUTO: 12 /HPF — HIGH (ref 0–5)
SQUAMOUS # UR AUTO: 6 /HPF — HIGH (ref 0–5)
SQUAMOUS # UR AUTO: 6 /HPF — HIGH (ref 0–5)
UROBILINOGEN FLD QL: 1 MG/DL — SIGNIFICANT CHANGE UP (ref 0.2–1)
WBC # BLD: 6.67 K/UL — SIGNIFICANT CHANGE UP (ref 3.8–10.5)
WBC # BLD: 6.67 K/UL — SIGNIFICANT CHANGE UP (ref 3.8–10.5)
WBC # FLD AUTO: 6.67 K/UL — SIGNIFICANT CHANGE UP (ref 3.8–10.5)
WBC # FLD AUTO: 6.67 K/UL — SIGNIFICANT CHANGE UP (ref 3.8–10.5)
WBC UR QL: 2 /HPF — SIGNIFICANT CHANGE UP (ref 0–5)
WBC UR QL: 2 /HPF — SIGNIFICANT CHANGE UP (ref 0–5)
WBC UR QL: 7 /HPF — HIGH (ref 0–5)
WBC UR QL: 7 /HPF — HIGH (ref 0–5)

## 2024-01-11 PROCEDURE — 99232 SBSQ HOSP IP/OBS MODERATE 35: CPT | Mod: 57

## 2024-01-11 PROCEDURE — 71045 X-RAY EXAM CHEST 1 VIEW: CPT | Mod: 26

## 2024-01-11 PROCEDURE — 99223 1ST HOSP IP/OBS HIGH 75: CPT | Mod: GC

## 2024-01-11 RX ORDER — SODIUM CHLORIDE 9 MG/ML
1500 INJECTION, SOLUTION INTRAVENOUS ONCE
Refills: 0 | Status: COMPLETED | OUTPATIENT
Start: 2024-01-11 | End: 2024-01-11

## 2024-01-11 RX ORDER — CEFTRIAXONE 500 MG/1
1000 INJECTION, POWDER, FOR SOLUTION INTRAMUSCULAR; INTRAVENOUS EVERY 24 HOURS
Refills: 0 | Status: COMPLETED | OUTPATIENT
Start: 2024-01-11 | End: 2024-01-14

## 2024-01-11 RX ADMIN — PANTOPRAZOLE SODIUM 40 MILLIGRAM(S): 20 TABLET, DELAYED RELEASE ORAL at 12:37

## 2024-01-11 RX ADMIN — DEXTROSE MONOHYDRATE, SODIUM CHLORIDE, AND POTASSIUM CHLORIDE 100 MILLILITER(S): 50; .745; 4.5 INJECTION, SOLUTION INTRAVENOUS at 13:56

## 2024-01-11 RX ADMIN — DEXTROSE MONOHYDRATE, SODIUM CHLORIDE, AND POTASSIUM CHLORIDE 125 MILLILITER(S): 50; .745; 4.5 INJECTION, SOLUTION INTRAVENOUS at 18:18

## 2024-01-11 RX ADMIN — HEPARIN SODIUM 5000 UNIT(S): 5000 INJECTION INTRAVENOUS; SUBCUTANEOUS at 21:51

## 2024-01-11 RX ADMIN — HEPARIN SODIUM 5000 UNIT(S): 5000 INJECTION INTRAVENOUS; SUBCUTANEOUS at 16:42

## 2024-01-11 RX ADMIN — Medication 5 MILLIGRAM(S): at 13:56

## 2024-01-11 RX ADMIN — SODIUM CHLORIDE 1000 MILLILITER(S): 9 INJECTION, SOLUTION INTRAVENOUS at 09:12

## 2024-01-11 NOTE — CONSULT NOTE ADULT - SUBJECTIVE AND OBJECTIVE BOX
Patient is a 73y old  Female who presents with a chief complaint of Small Bowel Obstruction (2024 07:55)    HPI/Hospital Course:  A 73 year old Female with PMHx of HTN, COPD (worked at [employer], no hx tobacco use), fibroids and PSHx of laparoscopic converted to open right hemicolectomy for a cecal mass (2023) incisional hernia repair with mesh. Patient presented to the ED for vaginal bleeding on  and was referred to see a Gynecologist, however, for family restraints she was not able to. The patient reports since 2023 she is been having abdominal pain "50/10" in severity globally non-radiating and associated with nausea, vomiting of bilious fluid and watery diarrhea. The diarrhea was mixed with blood, however, she attributed it to the ongoing vaginal bleeding. She also complains of heart racing, palpitations, and shortness of breath. She denies cough or hemoptysis. The patient adds she was supposed to get a cardiac stent placed, however, the hemicolectomy took precedence.     In the ED, the patient is afebrile, appears tachypneic with about 10cc of bilious vomit. Her laboratory values shows electrolyte derangements, with lactate within normal limits 1.8.   (2024 07:08)    Patient found to have SBO with transition point near previous anastomosis of R hemicolectomy (2023), admitted to surgery. SBO not resolving with conservative management including NGT. Now planned for OR  for diagnostic lap, possible, exploratory laparotomy with possible bowel resection, and possible ostomy. Pt also with abnormal uterine bleeding, GYN team following, planning to do endometrial sampling during surgery. Medicine consulted for preoperative optimization.     Patient seen at bedside. Reporting abdominal distension and fullness, no pain. Burping, had small BM yesterday. With NGT in place.   Patient reports history of COPD from working at [employer], appears well controlled, with COPD exacerbations requiring PO steroids only once every 2-3 years, last one this summer, never required hospitalization or intubation. Does not use an inhaler. Was prescribed montelukast in the past, does not take.       Pain Symptoms if applicable:             	                         none	   mild         moderate         severe  Pain:	                            0	    1-3	     4-6	         7-10  Location:	  Modifying factors:	  Associated symptoms:	    Function: [X] Independent  [ ] Assistance  [ ] Total care  [ ] Non-ambulatory - ambulates with walker due to lumbar back pain   On antiplatelet or anticoagulation? [ ] YES [X] NO  Prior anesthesia: Yes, tolerated without issue. No personal or familial history of adverse reaction to anesthesia   DASI: METS: >4      Allergies: No Known Allergies/Intolerances      HOME MEDICATIONS: omeprazole 40mg daily, metoprolol 12.mg BID (nonadherent, only takes 1x a day), atorvastatin 40mg qhs (nonadherent)      MEDICATIONS  (STANDING):  dextrose 5% + sodium chloride 0.45% with potassium chloride 20 mEq/L 1000 milliLiter(s) (100 mL/Hr) IV Continuous <Continuous>  heparin   Injectable 5000 Unit(s) SubCutaneous every 8 hours  metoprolol tartrate Injectable 5 milliGRAM(s) IV Push every 6 hours  pantoprazole  Injectable 40 milliGRAM(s) IV Push daily    MEDICATIONS  (PRN):      PAST MEDICAL & SURGICAL HISTORY:  HTN (hypertension)    Hernia repair x2  Right hemicolectomy for a cecal mass (2023)    SOCIAL HISTORY:  Residence: [ ] [facility]  [ ] SNF  [X] Community (lives alone)  [ ] Substance abuse: denies  [ ] Tobacco: denies  [ ] Alcohol use: denies     FAMILY HISTORY:  [X] No pertinent family history in first degree relatives     REVIEW OF SYSTEMS:  CONSTITUTIONAL: No fevers or chills  EYES/ENT: No visual changes;  No vertigo or throat pain   NECK: No pain or stiffness  RESPIRATORY: No cough, wheezing, hemoptysis; No shortness of breath  CARDIOVASCULAR: No chest pain or palpitations  GASTROINTESTINAL: No abdominal or epigastric pain. No nausea, vomiting, or hematemesis; No diarrhea or constipation. No melena or hematochezia.  GENITOURINARY: No dysuria, frequency or hematuria  NEUROLOGICAL: No syncope or dizziness  SKIN: No itching, rashes      Vital Signs Last 24 Hrs  T(C): 36.4 (2024 14:03), Max: 36.7 (10 Eros 2024 22:00)  T(F): 97.5 (2024 14:03), Max: 98 (10 Eros 2024 22:00)  HR: 80 (2024 14:03) (80 - 94)  BP: 127/69 (2024 14:03) (107/50 - 127/69)  BP(mean): --  RR: 17 (2024 14:03) (16 - 18)  SpO2: 99% (2024 14:03) (96% - 99%)    Parameters below as of 2024 14:03  Patient On (Oxygen Delivery Method): room air    PHYSICAL EXAM:  General: NAD, non-toxic appearing   HEENT: EOMi, no scleral icterus  CV: RRR, normal S1 and S2  Lungs: normal respiratory effort on RA, CTAB  Abd: +NGT, soft, nontender, nondistended  Ext: no edema, warm, well perfused   Pysch: AAOx3, appropriate affect   Neuro: grossly non-focal, moving all extremities spontaneously   Skin: no rashes or lesions     LABS:                        11.3   6.67  )-----------( 233      ( 2024 07:08 )             35.2     01-11    140  |  100  |  18  ----------------------------<  113<H>  3.8   |  29  |  1.26    Ca    8.7      2024 07:08  Phos  4.0     01-11  Mg     2.20     01-11      PT/INR - ( 2024 07:08 )   PT: 12.3 sec;   INR: 1.09 ratio         PTT - ( 2024 07:08 )  PTT:28.4 sec  Urinalysis Basic - ( 2024 12:05 )    Color: Dark Yellow / Appearance: Cloudy / S.040 / pH: x  Gluc: x / Ketone: Trace mg/dL  / Bili: Negative / Urobili: 1.0 mg/dL   Blood: x / Protein: 100 mg/dL / Nitrite: Negative   Leuk Esterase: Trace / RBC: 91 /HPF / WBC 7 /HPF   Sq Epi: x / Non Sq Epi: 12 /HPF / Bacteria: Many /HPF      CAPILLARY BLOOD GLUCOSE      RADIOLOGY & ADDITIONAL STUDIES: Yes     EKG:    Personally Reviewed:  [X] YES     Imaging:   Personally Reviewed:  [X] YES               Consultant(s) notes reviewed:    Care Discussed with Consultant(s)/Other Providers:    Advanced Directives: [ ] DNR  [ ] No feeding tube  [ ] MOLST in chart  [ ] MOLST completed today  [ ] Unknown    [ ] Probable osteoporosis  [ ] Possible osteomalacia  [ ] Increased delirium risk  [ ] Delirium and other risks can be reduced by:          -early ambulation          -minimizing "tethers" - IV, oxygen, catheters, etc          -avoiding hypnotics and sedatives          -maintaining hydration/nutrition          -avoid anticholinergics - diphenhydramine, etc          -pain control          -supportive environment   Patient is a 73y old  Female who presents with a chief complaint of Small Bowel Obstruction (2024 07:55)    HPI/Hospital Course:  A 73 year old Female with PMHx of HTN, COPD (worked at [employer], no hx tobacco use), fibroids and PSHx of laparoscopic converted to open right hemicolectomy for a cecal mass (2023) incisional hernia repair with mesh. Patient presented to the ED for vaginal bleeding on  and was referred to see a Gynecologist, however, for family restraints she was not able to. The patient reports since 2023 she is been having abdominal pain "50/10" in severity globally non-radiating and associated with nausea, vomiting of bilious fluid and watery diarrhea. The diarrhea was mixed with blood, however, she attributed it to the ongoing vaginal bleeding. She also complains of heart racing, palpitations, and shortness of breath. She denies cough or hemoptysis. The patient adds she was supposed to get a cardiac stent placed, however, the hemicolectomy took precedence.     In the ED, the patient is afebrile, appears tachypneic with about 10cc of bilious vomit. Her laboratory values shows electrolyte derangements, with lactate within normal limits 1.8.   (2024 07:08)    Patient found to have SBO with transition point near previous anastomosis of R hemicolectomy (2023), admitted to surgery. SBO not resolving with conservative management including NGT. Now planned for OR  for diagnostic lap, possible, exploratory laparotomy with possible bowel resection, and possible ostomy. Pt also with abnormal uterine bleeding, GYN team following, planning to do endometrial sampling during surgery. Medicine consulted for preoperative optimization.     Patient seen at bedside. Reporting abdominal distension and fullness, no pain. Burping, had small BM yesterday. With NGT in place.   Patient reports history of COPD from working at [employer], appears well controlled, with COPD exacerbations requiring PO steroids only once every 2-3 years, last one this summer, never required hospitalization or intubation. Does not use an inhaler. Was prescribed montelukast in the past, does not take.       Pain Symptoms if applicable:             	                         none	   mild         moderate         severe  Pain:	                            0	    1-3	     4-6	         7-10  Location:	  Modifying factors:	  Associated symptoms:	    Function: [X] Independent  [ ] Assistance  [ ] Total care  [ ] Non-ambulatory - ambulates with walker due to lumbar back pain   On antiplatelet or anticoagulation? [ ] YES [X] NO  Prior anesthesia: Yes, tolerated without issue. No personal or familial history of adverse reaction to anesthesia   DASI: METS: >4      Allergies: No Known Allergies/Intolerances      HOME MEDICATIONS: omeprazole 40mg daily, metoprolol 12.mg BID (nonadherent, only takes 1x a day), atorvastatin 40mg qhs (nonadherent)      MEDICATIONS  (STANDING):  dextrose 5% + sodium chloride 0.45% with potassium chloride 20 mEq/L 1000 milliLiter(s) (100 mL/Hr) IV Continuous <Continuous>  heparin   Injectable 5000 Unit(s) SubCutaneous every 8 hours  metoprolol tartrate Injectable 5 milliGRAM(s) IV Push every 6 hours  pantoprazole  Injectable 40 milliGRAM(s) IV Push daily    MEDICATIONS  (PRN):      PAST MEDICAL & SURGICAL HISTORY:  HTN (hypertension)    Hernia repair x2  Right hemicolectomy for a cecal mass (2023)    SOCIAL HISTORY:  Residence: [ ] [facility]  [ ] SNF  [X] Community (lives alone)  [ ] Substance abuse: denies  [ ] Tobacco: denies  [ ] Alcohol use: denies     FAMILY HISTORY:  [X] No pertinent family history in first degree relatives     REVIEW OF SYSTEMS:  CONSTITUTIONAL: No fevers or chills  EYES/ENT: No visual changes;  No vertigo or throat pain   NECK: No pain or stiffness  RESPIRATORY: No cough, wheezing, hemoptysis; No shortness of breath  CARDIOVASCULAR: No chest pain or palpitations  GASTROINTESTINAL: No abdominal or epigastric pain. No nausea, vomiting, or hematemesis; No diarrhea or constipation. No melena or hematochezia.  GENITOURINARY: No dysuria, frequency or hematuria  NEUROLOGICAL: No syncope or dizziness  SKIN: No itching, rashes      Vital Signs Last 24 Hrs  T(C): 36.4 (2024 14:03), Max: 36.7 (10 Eros 2024 22:00)  T(F): 97.5 (2024 14:03), Max: 98 (10 Eros 2024 22:00)  HR: 80 (2024 14:03) (80 - 94)  BP: 127/69 (2024 14:03) (107/50 - 127/69)  BP(mean): --  RR: 17 (2024 14:03) (16 - 18)  SpO2: 99% (2024 14:03) (96% - 99%)    Parameters below as of 2024 14:03  Patient On (Oxygen Delivery Method): room air    PHYSICAL EXAM:  General: NAD, non-toxic appearing   HEENT: EOMi, no scleral icterus  CV: RRR, normal S1 and S2  Lungs: normal respiratory effort on RA, CTAB  Abd: +NGT, soft, nontender, nondistended  Ext: no edema, warm, well perfused   Pysch: AAOx3, appropriate affect   Neuro: grossly non-focal, moving all extremities spontaneously   Skin: no rashes or lesions     LABS:                        11.3   6.67  )-----------( 233      ( 2024 07:08 )             35.2     01-11    140  |  100  |  18  ----------------------------<  113<H>  3.8   |  29  |  1.26    Ca    8.7      2024 07:08  Phos  4.0     01-11  Mg     2.20     01-11      PT/INR - ( 2024 07:08 )   PT: 12.3 sec;   INR: 1.09 ratio         PTT - ( 2024 07:08 )  PTT:28.4 sec  Urinalysis Basic - ( 2024 12:05 )    Color: Dark Yellow / Appearance: Cloudy / S.040 / pH: x  Gluc: x / Ketone: Trace mg/dL  / Bili: Negative / Urobili: 1.0 mg/dL   Blood: x / Protein: 100 mg/dL / Nitrite: Negative   Leuk Esterase: Trace / RBC: 91 /HPF / WBC 7 /HPF   Sq Epi: x / Non Sq Epi: 12 /HPF / Bacteria: Many /HPF      CAPILLARY BLOOD GLUCOSE      RADIOLOGY & ADDITIONAL STUDIES: Yes     EKG:    Personally Reviewed:  [X] YES     Imaging:   Personally Reviewed:  [X] YES               Consultant(s) notes reviewed:    Care Discussed with Consultant(s)/Other Providers:    Advanced Directives: [ ] DNR  [ ] No feeding tube  [ ] MOLST in chart  [ ] MOLST completed today  [ ] Unknown    [ ] Probable osteoporosis  [ ] Possible osteomalacia  [ ] Increased delirium risk  [ ] Delirium and other risks can be reduced by:          -early ambulation          -minimizing "tethers" - IV, oxygen, catheters, etc          -avoiding hypnotics and sedatives          -maintaining hydration/nutrition          -avoid anticholinergics - diphenhydramine, etc          -pain control          -supportive environment   Patient is a 73y old  Female who presents with a chief complaint of Small Bowel Obstruction (2024 07:55)    HPI/Hospital Course:  A 73 year old Female with PMHx of HTN, COPD (worked at [employer], no hx tobacco use), fibroids and PSHx of laparoscopic converted to open right hemicolectomy for a cecal mass (2023) incisional hernia repair with mesh. Patient presented to the ED for vaginal bleeding on  and was referred to see a Gynecologist, however, for family restraints she was not able to. The patient reports since 2023 she is been having abdominal pain "50/10" in severity globally non-radiating and associated with nausea, vomiting of bilious fluid and watery diarrhea. The diarrhea was mixed with blood, however, she attributed it to the ongoing vaginal bleeding. She also complains of heart racing, palpitations, and shortness of breath. She denies cough or hemoptysis. The patient adds she was supposed to get a cardiac stent placed, however, the hemicolectomy took precedence.     In the ED, the patient is afebrile, appears tachypneic with about 10cc of bilious vomit. Her laboratory values shows electrolyte derangements, with lactate within normal limits 1.8.   (2024 07:08)    Patient found to have SBO with transition point near previous anastomosis of R hemicolectomy (2023), admitted to surgery. SBO not resolving with conservative management including NGT. Now planned for OR  for diagnostic lap, possible, exploratory laparotomy with possible bowel resection, and possible ostomy. Pt also with abnormal uterine bleeding, GYN team following, planning to do endometrial sampling during surgery. Medicine consulted for preoperative optimization.     Patient seen at bedside. Reporting abdominal distension and fullness, no pain. Burping, had small BM yesterday. With NGT in place.   Patient reports history of COPD from working at [employer], appears well controlled, with COPD exacerbations requiring PO steroids only once every 2-3 years, last one this summer, never required hospitalization or intubation. Does not use an inhaler. Was prescribed montelukast in the past, does not take.       Pain Symptoms if applicable:             	                         none	   mild         moderate         severe  Pain:	                            0	    1-3	     4-6	         7-10  Location:	  Modifying factors:	  Associated symptoms:	    Function: [X] Independent  [ ] Assistance  [ ] Total care  [ ] Non-ambulatory - ambulates with walker due to lumbar back pain   On antiplatelet or anticoagulation? [ ] YES [X] NO  Prior anesthesia: Yes, tolerated without issue. No personal or familial history of adverse reaction to anesthesia   DASI: METS: >4      Allergies: No Known Allergies/Intolerances    HOME MEDICATIONS: omeprazole 40mg daily, metoprolol 12.mg BID (nonadherent, only takes 1x a day), atorvastatin 40mg qhs (nonadherent)    MEDICATIONS  (STANDING):  dextrose 5% + sodium chloride 0.45% with potassium chloride 20 mEq/L 1000 milliLiter(s) (100 mL/Hr) IV Continuous <Continuous>  heparin   Injectable 5000 Unit(s) SubCutaneous every 8 hours  metoprolol tartrate Injectable 5 milliGRAM(s) IV Push every 6 hours  pantoprazole  Injectable 40 milliGRAM(s) IV Push daily    MEDICATIONS  (PRN):      PAST MEDICAL & SURGICAL HISTORY:  HTN (hypertension)    Hernia repair x2  Right hemicolectomy for a cecal mass (2023)    SOCIAL HISTORY:  Residence: [ ] [facility]  [ ] SNF  [X] Community (lives alone)  [ ] Substance abuse: denies  [ ] Tobacco: denies  [ ] Alcohol use: denies     FAMILY HISTORY:  [X] No pertinent family history in first degree relatives     REVIEW OF SYSTEMS:  CONSTITUTIONAL: No fevers or chills  EYES/ENT: No visual changes;  No vertigo or throat pain   NECK: No pain or stiffness  RESPIRATORY: No cough, wheezing, hemoptysis; No shortness of breath  CARDIOVASCULAR: No chest pain or palpitations  GASTROINTESTINAL: No abdominal or epigastric pain. No nausea, vomiting, or hematemesis; No diarrhea or constipation. No melena or hematochezia.  GENITOURINARY: No dysuria, frequency or hematuria  NEUROLOGICAL: No syncope or dizziness  SKIN: No itching, rashes      Vital Signs Last 24 Hrs  T(C): 36.4 (2024 14:03), Max: 36.7 (10 Eros 2024 22:00)  T(F): 97.5 (2024 14:03), Max: 98 (10 Eros 2024 22:00)  HR: 80 (2024 14:03) (80 - 94)  BP: 127/69 (2024 14:03) (107/50 - 127/69)  BP(mean): --  RR: 17 (2024 14:03) (16 - 18)  SpO2: 99% (2024 14:03) (96% - 99%)    Parameters below as of 2024 14:03  Patient On (Oxygen Delivery Method): room air      PHYSICAL EXAM:  General: NAD, non-toxic appearing   HEENT: EOMi, no scleral icterus  CV: RRR, normal S1 and S2  Lungs: normal respiratory effort on RA, CTAB  Abd: +NGT, soft, nontender, nondistended  Ext: no edema, warm, well perfused   Pysch: AAOx3, appropriate affect   Neuro: grossly non-focal, moving all extremities spontaneously   Skin: no rashes or lesions       LABS:                        11.3   6.67  )-----------( 233      ( 2024 07:08 )             35.2     01-11    140  |  100  |  18  ----------------------------<  113<H>  3.8   |  29  |  1.26    Ca    8.7      2024 07:08  Phos  4.0     01-11  Mg     2.20     01-11      PT/INR - ( 2024 07:08 )   PT: 12.3 sec;   INR: 1.09 ratio      PTT - ( 2024 07:08 )  PTT:28.4 sec  Urinalysis Basic - ( 2024 12:05 )    Color: Dark Yellow / Appearance: Cloudy / S.040 / pH: x  Gluc: x / Ketone: Trace mg/dL  / Bili: Negative / Urobili: 1.0 mg/dL   Blood: x / Protein: 100 mg/dL / Nitrite: Negative   Leuk Esterase: Trace / RBC: 91 /HPF / WBC 7 /HPF   Sq Epi: x / Non Sq Epi: 12 /HPF / Bacteria: Many /HPF    CAPILLARY BLOOD GLUCOSE    RADIOLOGY & ADDITIONAL STUDIES: Yes     EKG:    Personally Reviewed:  [X] YES     Imaging:   Personally Reviewed:  [X] YES               Consultant(s) notes reviewed:    Care Discussed with Consultant(s)/Other Providers:         Patient is a 73y old  Female who presents with a chief complaint of Small Bowel Obstruction (2024 07:55)    HPI/Hospital Course:  A 73 year old Female with PMHx of HTN, COPD (worked at [employer], no hx tobacco use), fibroids and PSHx of laparoscopic converted to open right hemicolectomy for a cecal mass (2023) incisional hernia repair with mesh. Patient presented to the ED for vaginal bleeding on  and was referred to see a Gynecologist, however, for family restraints she was not able to. The patient reports since 2023 she is been having abdominal pain "50/10" in severity globally non-radiating and associated with nausea, vomiting of bilious fluid and watery diarrhea. The diarrhea was mixed with blood, however, she attributed it to the ongoing vaginal bleeding. She also complains of heart racing, palpitations, and shortness of breath. She denies cough or hemoptysis. The patient adds she was supposed to get a cardiac stent placed, however, the hemicolectomy took precedence.     In the ED, the patient is afebrile, appears tachypneic with about 10cc of bilious vomit. Her laboratory values shows electrolyte derangements, with lactate within normal limits 1.8.   (2024 07:08)    Patient found to have SBO with transition point near previous anastomosis of R hemicolectomy (2023), admitted to surgery. SBO not resolving with conservative management including NGT. Now planned for OR  for diagnostic lap, possible, exploratory laparotomy with possible bowel resection, and possible ostomy. Pt also with abnormal uterine bleeding, GYN team following, planning to do endometrial sampling during surgery. Medicine consulted for preoperative optimization.     Patient seen at bedside. Reporting abdominal distension and fullness, no pain. Burping, had small BM yesterday. With NGT in place.   Patient reports history of COPD from working at [employer], appears well controlled, with COPD exacerbations requiring PO steroids only once every 2-3 years, last one this summer, never required hospitalization or intubation. Does not use an inhaler. Was prescribed montelukast in the past, does not take.       Pain Symptoms if applicable:             	                         none	   mild         moderate         severe  Pain:	                            0	    1-3	     4-6	         7-10  Location:	  Modifying factors:	  Associated symptoms:	    Function: [X] Independent  [ ] Assistance  [ ] Total care  [ ] Non-ambulatory - ambulates with walker due to lumbar back pain   On antiplatelet or anticoagulation? [ ] YES [X] NO  Prior anesthesia: Yes, tolerated without issue. No personal or familial history of adverse reaction to anesthesia   DASI: METS: >4      Allergies: No Known Allergies/Intolerances    HOME MEDICATIONS: omeprazole 40mg daily, metoprolol 12.mg BID (nonadherent, only takes 1x a day), atorvastatin 40mg qhs (nonadherent)    MEDICATIONS  (STANDING):  dextrose 5% + sodium chloride 0.45% with potassium chloride 20 mEq/L 1000 milliLiter(s) (100 mL/Hr) IV Continuous <Continuous>  heparin   Injectable 5000 Unit(s) SubCutaneous every 8 hours  metoprolol tartrate Injectable 5 milliGRAM(s) IV Push every 6 hours  pantoprazole  Injectable 40 milliGRAM(s) IV Push daily    MEDICATIONS  (PRN):      PAST MEDICAL & SURGICAL HISTORY:  HTN (hypertension)    Hernia repair x2  Right hemicolectomy for a cecal mass (2023)    SOCIAL HISTORY:  Residence: [ ] [facility]  [ ] SNF  [X] Community (lives alone)  [ ] Substance abuse: denies  [ ] Tobacco: denies  [ ] Alcohol use: denies     FAMILY HISTORY:  [X] No pertinent family history in first degree relatives     REVIEW OF SYSTEMS:  CONSTITUTIONAL: No fevers or chills  EYES/ENT: No visual changes;  No vertigo or throat pain   NECK: No pain or stiffness  RESPIRATORY: No cough, wheezing, hemoptysis; No shortness of breath  CARDIOVASCULAR: No chest pain or palpitations  GASTROINTESTINAL: No abdominal or epigastric pain. No nausea, vomiting, or hematemesis; No diarrhea or constipation. No melena or hematochezia.  GENITOURINARY: No dysuria, frequency or hematuria  NEUROLOGICAL: No syncope or dizziness  SKIN: No itching, rashes      Vital Signs Last 24 Hrs  T(C): 36.4 (2024 14:03), Max: 36.7 (10 Eros 2024 22:00)  T(F): 97.5 (2024 14:03), Max: 98 (10 Eros 2024 22:00)  HR: 80 (2024 14:03) (80 - 94)  BP: 127/69 (2024 14:03) (107/50 - 127/69)  BP(mean): --  RR: 17 (2024 14:03) (16 - 18)  SpO2: 99% (2024 14:03) (96% - 99%)    Parameters below as of 2024 14:03  Patient On (Oxygen Delivery Method): room air      PHYSICAL EXAM:  General: NAD, non-toxic appearing   HEENT: EOMi, no scleral icterus  CV: RRR, normal S1 and S2  Lungs: normal respiratory effort on RA, CTAB  Abd: +NGT, soft, nontender, nondistended  Ext: no edema, warm, well perfused   Pysch: AAOx3, appropriate affect   Neuro: grossly non-focal, moving all extremities spontaneously   Skin: no rashes or lesions       LABS:                        11.3   6.67  )-----------( 233      ( 2024 07:08 )             35.2     01-11    140  |  100  |  18  ----------------------------<  113<H>  3.8   |  29  |  1.26    Ca    8.7      2024 07:08  Phos  4.0     01-11  Mg     2.20     01-11      PT/INR - ( 2024 07:08 )   PT: 12.3 sec;   INR: 1.09 ratio      PTT - ( 2024 07:08 )  PTT:28.4 sec  Urinalysis Basic - ( 2024 12:05 )    Color: Dark Yellow / Appearance: Cloudy / S.040 / pH: x  Gluc: x / Ketone: Trace mg/dL  / Bili: Negative / Urobili: 1.0 mg/dL   Blood: x / Protein: 100 mg/dL / Nitrite: Negative   Leuk Esterase: Trace / RBC: 91 /HPF / WBC 7 /HPF   Sq Epi: x / Non Sq Epi: 12 /HPF / Bacteria: Many /HPF    CAPILLARY BLOOD GLUCOSE    RADIOLOGY & ADDITIONAL STUDIES: Yes     EKG:    Personally Reviewed:  [X] YES     Imaging:   Personally Reviewed:  [X] YES               Consultant(s) notes reviewed:    Care Discussed with Consultant(s)/Other Providers:

## 2024-01-11 NOTE — CONSULT NOTE ADULT - ATTENDING COMMENTS
73F with PMH of HTN, COPD, fibroids, hx of AUB, abdominal surgery here w/ SBO. Despite conservative measures - SBO persist. Surgical team now planning for diagnostic ex lap w/ possible bowel resection and ostomy creation. Plan also for endometrial bx while under anesthesia. Chronic conditions are stable. Continue w/ current meds. Convert back to PO when able. DVT ppx as per primary team. No further preoperative testing indicated. Pt should proceed w/ surgically necessary intervention for further treatment.  Pls call us back w/ any questions.   [id number] 73F with PMH of HTN, COPD, fibroids, hx of AUB, abdominal surgery here w/ SBO. Despite conservative measures - SBO persist. Surgical team now planning for diagnostic ex lap w/ possible bowel resection and ostomy creation. Plan also for endometrial bx while under anesthesia. Chronic conditions are stable. Continue w/ current meds. Convert back to PO when able. DVT ppx as per primary team. No further preoperative testing indicated. Pt should proceed w/ surgically necessary intervention for further treatment.  Pls call us back w/ any questions.   [id number]

## 2024-01-11 NOTE — CHART NOTE - NSCHARTNOTEFT_GEN_A_CORE
R2 GYN Chart Note    Pt scheduled by primary surgery team for OR 1/12 for dx lap, possible ex lap, possible ostomy for persistent high-grade SBO. Pt seen at bedside to discuss possible pelvic EUA and D+C by GYN team at time of surgery for endometrial sampling. Pt agreeable to plan. Consent completed and in chart. Surgery team notified.    D/w Dr. Lovelace, GYN service attending  [facility] PGY2 R2 GYN Chart Note    Pt scheduled by primary surgery team for OR 1/12 for dx lap, possible ex lap, possible ostomy for persistent high-grade SBO. Pt seen at bedside to discuss possible pelvic EUA and D+C by GYN team at time of surgery for endometrial sampling. Pt agreeable to plan. Consent completed and in chart. Surgery team notified.    D/w Dr. Lovelace, GYN service attending  [facility] PGY2 R2 GYN Chart Note    Pt scheduled by primary surgery team for OR 1/12 for dx lap, possible ex lap, possible ostomy for persistent high-grade SBO. Pt seen at bedside to discuss possible pelvic EUA and D+C by GYN team at time of surgery for endometrial sampling. Pt agreeable to plan. Consent completed and in chart. Surgery team notified and agreeable to plan.    D/w Dr. Lovelace, GYN service attending  [facility] PGY2 R2 GYN Chart Note    Pt scheduled by primary surgery team for OR 1/12 for dx lap, possible ex lap, possible ostomy for persistent high-grade SBO. Pt seen at bedside to discuss possible pelvic EUA and D+C by GYN team at time of surgery for endometrial sampling. Pt agreeable to plan. Consent completed and in chart. Surgery team notified and agreeable to plan.    D/w Dr. Lovelace, GYN service attending  [facility] PGY2

## 2024-01-11 NOTE — ADVANCED PRACTICE NURSE CONSULT - REASON FOR CONSULT
Patient seen for pre-operative stoma marking. Patient is a 73F with PMHx of HTN, COPD, fibroids and PSHx of Rt. hemicolectomy (04/2023) presents for abdominal pain, nausea, vomiting and ongoing vaginal bleeding since December 12th. CT scan shows high-grade SBO with a transition point located in the distal small bowel, just proximal to the ileocolic anastomosis, repeat CT showing worsening of the SBO, planning to take to OR 01/12/24 for dx lap, possible ex lap, possible ostomy.

## 2024-01-11 NOTE — PROGRESS NOTE ADULT - ASSESSMENT
73F with PMHx of HTN, COPD, fibroids and PSHx of Rt. hemicolectomy (04/2023) presents for abdominal pain, nausea, vomiting and ongoing vaginal bleeding since December 12th. CT scan shows high-grade SBO with a transition point located in the distal small bowel, just proximal to the ileocolic anastomosis, repeat CT showing worsening of the SBO, planning to take to OR    Plan:  - OR planning for 01/12/24 for dx lap, possible ex lap, possible ostomy   - Wound care for ostomy marking  - Replace NGT output 1/2:1cc  - NPO, NGT, IVF  - Abdominal exam prior to pain medications  - OOB ambulate with assistance  - strict I's/O's  - Appreciate gynteam input; patient to re-schedule appointment with Dr. Elizondo for outpatient follow up including endometrial biopsy, patient agreeable to plan    A-Team  [id number]   73F with PMHx of HTN, COPD, fibroids and PSHx of Rt. hemicolectomy (04/2023) presents for abdominal pain, nausea, vomiting and ongoing vaginal bleeding since December 12th. CT scan shows high-grade SBO with a transition point located in the distal small bowel, just proximal to the ileocolic anastomosis, repeat CT showing worsening of the SBO, planning to take to OR    Plan:  - OR planning for 01/12/24 for dx lap, possible ex lap, possible ostomy   - Wound care for ostomy marking  - Replace NGT output 1/2:1cc  - NPO, NGT, IVF  - Abdominal exam prior to pain medications  - OOB ambulate with assistance  - strict I's/O's  - Appreciate gynteam input; patient to re-schedule appointment with Dr. Elizondo for outpatient follow up including endometrial biopsy, patient agreeable to plan    A-Team  [id number]

## 2024-01-11 NOTE — PROGRESS NOTE ADULT - SUBJECTIVE AND OBJECTIVE BOX
TEAM [ A ] Surgery Daily Progress Note  =====================================================  INTERVAL: Patient got CT scan done yesterday, showed worsening of small bowel dilation.     SUBJECTIVE: Patient seen and examined at bedside on AM rounds. Patient still passing gas and having bowel movements. No nausea or vomiting. Patient is NPO with NG tube.     PMH:  PAST MEDICAL & SURGICAL HISTORY:  HTN (hypertension)    ALLERGIES:  No Known Allergies    --------------------------------------------------------------------------------------    VITAL SIGNS:  Vital Signs Last 24 Hrs  T(C): 36.6 (11 Jan 2024 05:00), Max: 36.8 (10 Eros 2024 09:03)  T(F): 97.9 (11 Jan 2024 05:00), Max: 98.2 (10 Eros 2024 09:03)  HR: 82 (11 Jan 2024 05:00) (82 - 92)  BP: 107/50 (11 Jan 2024 05:00) (107/50 - 126/73)  BP(mean): --  RR: 18 (11 Jan 2024 05:00) (16 - 18)  SpO2: 96% (11 Jan 2024 05:00) (96% - 100%)    Parameters below as of 11 Jan 2024 05:00  Patient On (Oxygen Delivery Method): room air      --------------------------------------------------------------------------------------    EXAM  General Appearance: Appears well, NAD  Chest: Breathing comfortably on RA.    CV: S1, S2  Abdomen: Soft, nondistended, + minimal tenderness to palpation epigastric. No rebound or guarding. NG tube in place.   Extremities: Moves all extremities.    --------------------------------------------------------------------------------------    LABS                          12.1   7.08  )-----------( 269      ( 10 Eros 2024 06:44 )             37.6   01-10    138  |  100  |  20  ----------------------------<  120<H>  3.6   |  27  |  1.16    Ca    9.1      10 Eros 2024 06:44  Phos  3.1     01-10  Mg     1.80     01-10        --------------------------------------------------------------------------------------    INS AND OUTS:  I&O's Detail    10 Eros 2024 07:01  -  11 Jan 2024 07:00  --------------------------------------------------------  IN:    dextrose 5% + sodium chloride 0.45% w/ Additives: 1100 mL    Enteral Tube Flush: 20 mL    IV PiggyBack: 350 mL    Oral Fluid: 810 mL  Total IN: 2280 mL    OUT:    Nasogastric/Oral tube (mL): 2940 mL    Voided (mL): 252 mL  Total OUT: 3192 mL    Total NET: -912 mL        --------------------------------------------------------------------------------------

## 2024-01-11 NOTE — ADVANCED PRACTICE NURSE CONSULT - ASSESSMENT
Patient is a&ox4, OOb with walker at baseline, skin warm dry intact. Patient states she is familiar with ostomies as her friend has a colostomy and she was previously educated on ostomies in april. Abdomen assessed in lying, sitting and standing position. Stoma julia made in LUQ and RUQ, above umbilicus, not impinging on the midline, avoiding creases/folds, within the rectus muscle. Explained stoma creation, and introduced pouching system to patient.  Patient able to understand use of pouching system and frequency of pouching system changes/emptying pouching system. Other questions answered about lifestyle after surgery with pouching system answered.

## 2024-01-11 NOTE — ADVANCED PRACTICE NURSE CONSULT - RECOMMEDATIONS
Please place ostomy nurse consult for post operative ostomy teaching.   Please contact Wound/Ostomy Care Service Line if we can be of further assistance (ext [phone]).  Please place ostomy nurse consult for post operative ostomy teaching.   Please contact Wound/Ostomy Care Service Line if we can be of further assistance (ext [phone]).

## 2024-01-11 NOTE — CONSULT NOTE ADULT - ASSESSMENT
HTN, COPD (worked [place] [employer], no hx tobacco use), fibroids and PSHx of laparoscopic converted to open right hemicolectomy for a cecal mass (04/2023) incisional hernia repair with mesh.  73F PMH with HTN, COPD (worked [place] [employer] [place], no hx tobacco use), fibroids, AUB, and h/o right hemicolectomy for a cecal mass (04/2023) admitted for SBO with transition point near previous anastomosis of R hemicolectomy. SBO not resolving with conservative management now planned for OR 1/12/24 for diagnostic lap, possible, exploratory laparotomy with possible bowel resection, and possible ostomy. Pt also with abnormal uterine bleeding, GYN team following, planning to do endometrial sampling during surgery. Medicine consulted for preoperative optimization.    #pre-op optimization   Last EKG showing NSR at 93 bpm without acute ischemic changes and with normal intervals  - Pt with no active cardiac conditions, including unstable coronary syndrome, decompensated CHF, significant arrhythmias, or severe valvular disease  - COPD well controlled, no SOB, wheezing, saturating well on RA   - RCRI score of 0, which equates to a 3.9% 30-day risk of death, MI, or cardiac arrest  - Pt pending an intermediate-risk procedure with an RCRI score of 0. Prior to hospitalization, pt was able to perform >4METS functional capacity with METs >=4, pt does not need further cardiac testing prior to OR.     73F PMH with HTN, COPD (worked [place], no hx tobacco use), fibroids, AUB, and h/o right hemicolectomy for a cecal mass (04/2023) admitted for SBO with transition point near previous anastomosis of R hemicolectomy. SBO not resolving with conservative management now planned for OR 1/12/24 for diagnostic lap, possible, exploratory laparotomy with possible bowel resection, and possible ostomy. Pt also with abnormal uterine bleeding, GYN team following, planning to do endometrial sampling during surgery. Medicine consulted for preoperative optimization.    #pre-op optimization   Last EKG showing NSR at 93 bpm without acute ischemic changes and with normal intervals  - Pt with no active cardiac conditions, including unstable coronary syndrome, decompensated CHF, significant arrhythmias, or severe valvular disease  - COPD well controlled, no SOB, wheezing, saturating well on RA   - RCRI score of 0, which equates to a 3.9% 30-day risk of death, MI, or cardiac arrest  - Pt pending an intermediate-risk procedure with an RCRI score of 0. Prior to hospitalization, pt was able to perform >4METS functional capacity with METs >=4, pt does not need further cardiac testing prior to OR.

## 2024-01-11 NOTE — PROVIDER CONTACT NOTE (MEDICATION) - SITUATION
pt refusing heparin administration. pt educated on indication of heparin order and risk factors. pt verbalized understanding of education provided but still refused

## 2024-01-11 NOTE — CHART NOTE - NSCHARTNOTEFT_GEN_A_CORE
A Team Surgery Preop Note    Patient is a 73y old  Female who presents with a chief complaint of Small Bowel Obstruction (2024 07:55)    Diagnosis: Small Bowel Obstruction  Procedure: Ex Lap, FORTINO, Possible Bowel Resection, Possible Ostomy  Surgeon: Dr. Sis Salguero  PMH: HTN, COPD, fibroids   PSHx Laparoscopic to open right hemicolectomy for a cecal mass (2023) incisional hernia repair with mesh  Allergies: NKDA                          11.3   6.67  )-----------( 233      ( 2024 07:08 )             35.2     01-11    140  |  100  |  18  ----------------------------<  113<H>  3.8   |  29  |  1.26    Ca    8.7      2024 07:08  Phos  4.0       Mg     2.20     -11      PT/INR - ( 2024 07:08 )   PT: 12.3 sec;   INR: 1.09 ratio         PTT - ( 2024 07:08 )  PTT:28.4 sec  Urinalysis Basic - ( 2024 12:05 )    Color: Dark Yellow / Appearance: Cloudy / S.040 / pH: x  Gluc: x / Ketone: Trace mg/dL  / Bili: Negative / Urobili: 1.0 mg/dL   Blood: x / Protein: 100 mg/dL / Nitrite: Negative   Leuk Esterase: Trace / RBC: 91 /HPF / WBC 7 /HPF   Sq Epi: x / Non Sq Epi: 12 /HPF / Bacteria: Many /HPF    A/p  73F SBO not resolving with transition point near previous anastamosis of Right Hemicolectomy .    - NPO/IVF  - Repeat UA sent due to specimen appearing contaminated  - AM Labs ordered for 1am: Type & Screen, CBC, BMP, PT/PTT/INR  - Chest X-ray 24 No Lung Pathology  - EKG 1/10/24 NSR @ 84. No acute ST changes  - Consent obtained and in chart  - Medical Clearance Pending  - On OR Schedule    A Team Surgery  #[id number] A Team Surgery Preop Note    Patient is a 73y old  Female who presents with a chief complaint of Small Bowel Obstruction (2024 07:55)    Diagnosis: Small Bowel Obstruction  Procedure: Ex Lap, FORTINO, Possible Bowel Resection, Possible Ostomy  Surgeon: Dr. Sis Salguero  PMH: HTN, COPD, fibroids   PSHx Laparoscopic to open right hemicolectomy for a cecal mass (2023) incisional hernia repair with mesh  Allergies: NKDA                          11.3   6.67  )-----------( 233      ( 2024 07:08 )             35.2     01-11    140  |  100  |  18  ----------------------------<  113<H>  3.8   |  29  |  1.26    Ca    8.7      2024 07:08  Phos  4.0       Mg     2.20     -11      PT/INR - ( 2024 07:08 )   PT: 12.3 sec;   INR: 1.09 ratio         PTT - ( 2024 07:08 )  PTT:28.4 sec  Urinalysis Basic - ( 2024 12:05 )    Color: Dark Yellow / Appearance: Cloudy / S.040 / pH: x  Gluc: x / Ketone: Trace mg/dL  / Bili: Negative / Urobili: 1.0 mg/dL   Blood: x / Protein: 100 mg/dL / Nitrite: Negative   Leuk Esterase: Trace / RBC: 91 /HPF / WBC 7 /HPF   Sq Epi: x / Non Sq Epi: 12 /HPF / Bacteria: Many /HPF    A/p  73F SBO not resolving with transition point near previous anastamosis of Right Hemicolectomy .    - NPO/IVF  - Repeat UA sent due to specimen appearing contaminated  - AM Labs ordered for 1am: Type & Screen, CBC, BMP, PT/PTT/INR  - Chest X-ray 24 No Lung Pathology  - EKG 1/10/24 NSR @ 84. No acute ST changes  - Consent obtained and in chart  - Medical Clearance Pending  - On OR Schedule    A Team Surgery  #[id number] A Team Surgery Preop Note    Patient is a 73y old  Female who presents with a chief complaint of Small Bowel Obstruction (2024 07:55)    Diagnosis: Small Bowel Obstruction  Procedure: Dx Lap, Possible Ex Lap, FORTINO, Possible Bowel Resection, Possible Ostomy  Surgeon: Dr. Sis Salguero  PMH: HTN, COPD, fibroids   PSHx Lap to open right hemicolectomy for a cecal mass (2023), Incisional hernia repair with mesh  Allergies: NKDA                          11.3   6.67  )-----------( 233      ( 2024 07:08 )             35.2     01-11    140  |  100  |  18  ----------------------------<  113<H>  3.8   |  29  |  1.26    Ca    8.7      2024 07:08  Phos  4.0     -  Mg     2.20     -11      PT/INR - ( 2024 07:08 )   PT: 12.3 sec;   INR: 1.09 ratio         PTT - ( 2024 07:08 )  PTT:28.4 sec  Urinalysis Basic - ( 2024 12:05 )    Color: Dark Yellow / Appearance: Cloudy / S.040 / pH: x  Gluc: x / Ketone: Trace mg/dL  / Bili: Negative / Urobili: 1.0 mg/dL   Blood: x / Protein: 100 mg/dL / Nitrite: Negative   Leuk Esterase: Trace / RBC: 91 /HPF / WBC 7 /HPF   Sq Epi: x / Non Sq Epi: 12 /HPF / Bacteria: Many /HPF    A/p  73F PMH HTN, COPD admited with SBO not resolving with transition point near previous anastamosis of Right Hemicolectomy  for OR 24 for Dx Lap, Possible Ex Lap FORTINO, Possible Bowel Resection, Possible Ostomy    - NPO/IVF  - Repeat UA sent due to specimen appearing contaminated  - AM Labs ordered for 1am: Type & Screen, CBC, BMP, PT/PTT/INR  - Chest X-ray 24 No Lung Pathology  - EKG 1/10/24 NSR @ 84. No acute ST changes  - Consent obtained and in chart  - Medical Clearance Pending  - On OR Schedule    A Team Surgery  #[id number] A Team Surgery Preop Note    Patient is a 73y old  Female who presents with a chief complaint of Small Bowel Obstruction (2024 07:55)    Diagnosis: Small Bowel Obstruction  Procedure: Dx Lap, Possible Ex Lap, FORTINO, Possible Bowel Resection, Possible Ostomy  Surgeon: Dr. Sis Salguero  PMH: HTN, COPD, fibroids   PSHx Lap to open right hemicolectomy for a cecal mass (2023), Incisional hernia repair with mesh  Allergies: NKDA                          11.3   6.67  )-----------( 233      ( 2024 07:08 )             35.2     01-11    140  |  100  |  18  ----------------------------<  113<H>  3.8   |  29  |  1.26    Ca    8.7      2024 07:08  Phos  4.0     -  Mg     2.20     -11      PT/INR - ( 2024 07:08 )   PT: 12.3 sec;   INR: 1.09 ratio         PTT - ( 2024 07:08 )  PTT:28.4 sec  Urinalysis Basic - ( 2024 12:05 )    Color: Dark Yellow / Appearance: Cloudy / S.040 / pH: x  Gluc: x / Ketone: Trace mg/dL  / Bili: Negative / Urobili: 1.0 mg/dL   Blood: x / Protein: 100 mg/dL / Nitrite: Negative   Leuk Esterase: Trace / RBC: 91 /HPF / WBC 7 /HPF   Sq Epi: x / Non Sq Epi: 12 /HPF / Bacteria: Many /HPF    A/p  73F PMH HTN, COPD admited with SBO not resolving with transition point near previous anastamosis of Right Hemicolectomy  for OR 24 for Dx Lap, Possible Ex Lap FORTINO, Possible Bowel Resection, Possible Ostomy    - NPO/IVF  - Repeat UA sent due to specimen appearing contaminated  - AM Labs ordered for 1am: Type & Screen, CBC, BMP, PT/PTT/INR  - Chest X-ray 24 No Lung Pathology  - EKG 1/10/24 NSR @ 84. No acute ST changes  - Consent obtained and in chart  - Medical Clearance Pending  - On OR Schedule    A Team Surgery  #[id number]

## 2024-01-12 ENCOUNTER — TRANSCRIPTION ENCOUNTER (OUTPATIENT)
Age: 74
End: 2024-01-12

## 2024-01-12 ENCOUNTER — RESULT REVIEW (OUTPATIENT)
Age: 74
End: 2024-01-12

## 2024-01-12 ENCOUNTER — APPOINTMENT (OUTPATIENT)
Dept: SURGERY | Facility: HOSPITAL | Age: 74
End: 2024-01-12
Payer: MEDICARE

## 2024-01-12 LAB
ANION GAP SERPL CALC-SCNC: 13 MMOL/L — SIGNIFICANT CHANGE UP (ref 7–14)
ANION GAP SERPL CALC-SCNC: 13 MMOL/L — SIGNIFICANT CHANGE UP (ref 7–14)
APTT BLD: 28.6 SEC — SIGNIFICANT CHANGE UP (ref 24.5–35.6)
APTT BLD: 28.6 SEC — SIGNIFICANT CHANGE UP (ref 24.5–35.6)
BLD GP AB SCN SERPL QL: NEGATIVE — SIGNIFICANT CHANGE UP
BLD GP AB SCN SERPL QL: NEGATIVE — SIGNIFICANT CHANGE UP
BUN SERPL-MCNC: 16 MG/DL — SIGNIFICANT CHANGE UP (ref 7–23)
BUN SERPL-MCNC: 16 MG/DL — SIGNIFICANT CHANGE UP (ref 7–23)
CALCIUM SERPL-MCNC: 8.6 MG/DL — SIGNIFICANT CHANGE UP (ref 8.4–10.5)
CALCIUM SERPL-MCNC: 8.6 MG/DL — SIGNIFICANT CHANGE UP (ref 8.4–10.5)
CHLORIDE SERPL-SCNC: 103 MMOL/L — SIGNIFICANT CHANGE UP (ref 98–107)
CHLORIDE SERPL-SCNC: 103 MMOL/L — SIGNIFICANT CHANGE UP (ref 98–107)
CO2 SERPL-SCNC: 27 MMOL/L — SIGNIFICANT CHANGE UP (ref 22–31)
CO2 SERPL-SCNC: 27 MMOL/L — SIGNIFICANT CHANGE UP (ref 22–31)
CREAT SERPL-MCNC: 1.02 MG/DL — SIGNIFICANT CHANGE UP (ref 0.5–1.3)
CREAT SERPL-MCNC: 1.02 MG/DL — SIGNIFICANT CHANGE UP (ref 0.5–1.3)
EGFR: 58 ML/MIN/1.73M2 — LOW
EGFR: 58 ML/MIN/1.73M2 — LOW
GLUCOSE BLDC GLUCOMTR-MCNC: 103 MG/DL — HIGH (ref 70–99)
GLUCOSE BLDC GLUCOMTR-MCNC: 103 MG/DL — HIGH (ref 70–99)
GLUCOSE SERPL-MCNC: 115 MG/DL — HIGH (ref 70–99)
GLUCOSE SERPL-MCNC: 115 MG/DL — HIGH (ref 70–99)
HCT VFR BLD CALC: 34.9 % — SIGNIFICANT CHANGE UP (ref 34.5–45)
HCT VFR BLD CALC: 34.9 % — SIGNIFICANT CHANGE UP (ref 34.5–45)
HGB BLD-MCNC: 10.8 G/DL — LOW (ref 11.5–15.5)
HGB BLD-MCNC: 10.8 G/DL — LOW (ref 11.5–15.5)
INR BLD: 1.2 RATIO — HIGH (ref 0.85–1.18)
INR BLD: 1.2 RATIO — HIGH (ref 0.85–1.18)
MAGNESIUM SERPL-MCNC: 1.8 MG/DL — SIGNIFICANT CHANGE UP (ref 1.6–2.6)
MAGNESIUM SERPL-MCNC: 1.8 MG/DL — SIGNIFICANT CHANGE UP (ref 1.6–2.6)
MCHC RBC-ENTMCNC: 25.5 PG — LOW (ref 27–34)
MCHC RBC-ENTMCNC: 25.5 PG — LOW (ref 27–34)
MCHC RBC-ENTMCNC: 30.9 GM/DL — LOW (ref 32–36)
MCHC RBC-ENTMCNC: 30.9 GM/DL — LOW (ref 32–36)
MCV RBC AUTO: 82.3 FL — SIGNIFICANT CHANGE UP (ref 80–100)
MCV RBC AUTO: 82.3 FL — SIGNIFICANT CHANGE UP (ref 80–100)
NRBC # BLD: 0 /100 WBCS — SIGNIFICANT CHANGE UP (ref 0–0)
NRBC # BLD: 0 /100 WBCS — SIGNIFICANT CHANGE UP (ref 0–0)
NRBC # FLD: 0 K/UL — SIGNIFICANT CHANGE UP (ref 0–0)
NRBC # FLD: 0 K/UL — SIGNIFICANT CHANGE UP (ref 0–0)
PHOSPHATE SERPL-MCNC: 3.5 MG/DL — SIGNIFICANT CHANGE UP (ref 2.5–4.5)
PHOSPHATE SERPL-MCNC: 3.5 MG/DL — SIGNIFICANT CHANGE UP (ref 2.5–4.5)
PLATELET # BLD AUTO: 211 K/UL — SIGNIFICANT CHANGE UP (ref 150–400)
PLATELET # BLD AUTO: 211 K/UL — SIGNIFICANT CHANGE UP (ref 150–400)
POTASSIUM SERPL-MCNC: 3.7 MMOL/L — SIGNIFICANT CHANGE UP (ref 3.5–5.3)
POTASSIUM SERPL-MCNC: 3.7 MMOL/L — SIGNIFICANT CHANGE UP (ref 3.5–5.3)
POTASSIUM SERPL-SCNC: 3.7 MMOL/L — SIGNIFICANT CHANGE UP (ref 3.5–5.3)
POTASSIUM SERPL-SCNC: 3.7 MMOL/L — SIGNIFICANT CHANGE UP (ref 3.5–5.3)
PROTHROM AB SERPL-ACNC: 13.4 SEC — HIGH (ref 9.5–13)
PROTHROM AB SERPL-ACNC: 13.4 SEC — HIGH (ref 9.5–13)
RBC # BLD: 4.24 M/UL — SIGNIFICANT CHANGE UP (ref 3.8–5.2)
RBC # BLD: 4.24 M/UL — SIGNIFICANT CHANGE UP (ref 3.8–5.2)
RBC # FLD: 17.7 % — HIGH (ref 10.3–14.5)
RBC # FLD: 17.7 % — HIGH (ref 10.3–14.5)
RH IG SCN BLD-IMP: POSITIVE — SIGNIFICANT CHANGE UP
RH IG SCN BLD-IMP: POSITIVE — SIGNIFICANT CHANGE UP
SODIUM SERPL-SCNC: 143 MMOL/L — SIGNIFICANT CHANGE UP (ref 135–145)
SODIUM SERPL-SCNC: 143 MMOL/L — SIGNIFICANT CHANGE UP (ref 135–145)
WBC # BLD: 7.69 K/UL — SIGNIFICANT CHANGE UP (ref 3.8–10.5)
WBC # BLD: 7.69 K/UL — SIGNIFICANT CHANGE UP (ref 3.8–10.5)
WBC # FLD AUTO: 7.69 K/UL — SIGNIFICANT CHANGE UP (ref 3.8–10.5)
WBC # FLD AUTO: 7.69 K/UL — SIGNIFICANT CHANGE UP (ref 3.8–10.5)

## 2024-01-12 PROCEDURE — 88305 TISSUE EXAM BY PATHOLOGIST: CPT | Mod: 26

## 2024-01-12 PROCEDURE — 88360 TUMOR IMMUNOHISTOCHEM/MANUAL: CPT | Mod: 26,59

## 2024-01-12 PROCEDURE — 88307 TISSUE EXAM BY PATHOLOGIST: CPT | Mod: 26

## 2024-01-12 PROCEDURE — 44125 REMOVAL OF SMALL INTESTINE: CPT

## 2024-01-12 PROCEDURE — 88305 TISSUE EXAM BY PATHOLOGIST: CPT | Mod: 26,59

## 2024-01-12 PROCEDURE — 58120 DILATION AND CURETTAGE: CPT | Mod: GC

## 2024-01-12 PROCEDURE — 88342 IMHCHEM/IMCYTCHM 1ST ANTB: CPT | Mod: 26

## 2024-01-12 PROCEDURE — 49320 DIAG LAPARO SEPARATE PROC: CPT | Mod: 59

## 2024-01-12 PROCEDURE — 88341 IMHCHEM/IMCYTCHM EA ADD ANTB: CPT | Mod: 26

## 2024-01-12 PROCEDURE — 88141 CYTOPATH C/V INTERPRET: CPT

## 2024-01-12 DEVICE — LIGATING CLIPS WECK HORIZON MEDIUM (BLUE) 24: Type: IMPLANTABLE DEVICE | Status: FUNCTIONAL

## 2024-01-12 DEVICE — STAPLER COVIDIEN ENDO GIA 80-3.8MM BLUE RELOAD: Type: IMPLANTABLE DEVICE | Status: FUNCTIONAL

## 2024-01-12 DEVICE — LIGATING CLIPS WECK HORIZON LARGE (ORANGE) 24: Type: IMPLANTABLE DEVICE | Status: FUNCTIONAL

## 2024-01-12 DEVICE — STAPLER COVIDIEN ENDO GIA 80-3.8MM BLUE: Type: IMPLANTABLE DEVICE | Status: FUNCTIONAL

## 2024-01-12 RX ORDER — ACETAMINOPHEN 500 MG
1000 TABLET ORAL EVERY 6 HOURS
Refills: 0 | Status: COMPLETED | OUTPATIENT
Start: 2024-01-12 | End: 2024-01-13

## 2024-01-12 RX ORDER — ONDANSETRON 8 MG/1
4 TABLET, FILM COATED ORAL ONCE
Refills: 0 | Status: COMPLETED | OUTPATIENT
Start: 2024-01-12 | End: 2024-01-12

## 2024-01-12 RX ORDER — OXYCODONE HYDROCHLORIDE 5 MG/1
10 TABLET ORAL
Refills: 0 | Status: DISCONTINUED | OUTPATIENT
Start: 2024-01-12 | End: 2024-01-12

## 2024-01-12 RX ORDER — ONDANSETRON 8 MG/1
4 TABLET, FILM COATED ORAL EVERY 6 HOURS
Refills: 0 | Status: DISCONTINUED | OUTPATIENT
Start: 2024-01-12 | End: 2024-01-15

## 2024-01-12 RX ORDER — KETOROLAC TROMETHAMINE 30 MG/ML
15 SYRINGE (ML) INJECTION EVERY 6 HOURS
Refills: 0 | Status: DISCONTINUED | OUTPATIENT
Start: 2024-01-12 | End: 2024-01-15

## 2024-01-12 RX ORDER — DIPHENHYDRAMINE HCL 50 MG
12.5 CAPSULE ORAL ONCE
Refills: 0 | Status: COMPLETED | OUTPATIENT
Start: 2024-01-12 | End: 2024-01-12

## 2024-01-12 RX ORDER — MORPHINE SULFATE 50 MG/1
0.2 CAPSULE, EXTENDED RELEASE ORAL ONCE
Refills: 0 | Status: DISCONTINUED | OUTPATIENT
Start: 2024-01-12 | End: 2024-01-13

## 2024-01-12 RX ORDER — OXYCODONE HYDROCHLORIDE 5 MG/1
5 TABLET ORAL
Refills: 0 | Status: DISCONTINUED | OUTPATIENT
Start: 2024-01-12 | End: 2024-01-12

## 2024-01-12 RX ORDER — HYDROMORPHONE HYDROCHLORIDE 2 MG/ML
1 INJECTION INTRAMUSCULAR; INTRAVENOUS; SUBCUTANEOUS
Refills: 0 | Status: DISCONTINUED | OUTPATIENT
Start: 2024-01-12 | End: 2024-01-15

## 2024-01-12 RX ORDER — POTASSIUM CHLORIDE 20 MEQ
10 PACKET (EA) ORAL
Refills: 0 | Status: COMPLETED | OUTPATIENT
Start: 2024-01-12 | End: 2024-01-12

## 2024-01-12 RX ORDER — MAGNESIUM SULFATE 500 MG/ML
2 VIAL (ML) INJECTION ONCE
Refills: 0 | Status: COMPLETED | OUTPATIENT
Start: 2024-01-12 | End: 2024-01-12

## 2024-01-12 RX ORDER — HYDROMORPHONE HYDROCHLORIDE 2 MG/ML
0.5 INJECTION INTRAMUSCULAR; INTRAVENOUS; SUBCUTANEOUS EVERY 4 HOURS
Refills: 0 | Status: DISCONTINUED | OUTPATIENT
Start: 2024-01-12 | End: 2024-01-15

## 2024-01-12 RX ORDER — NALOXONE HYDROCHLORIDE 4 MG/.1ML
0.1 SPRAY NASAL
Refills: 0 | Status: DISCONTINUED | OUTPATIENT
Start: 2024-01-12 | End: 2024-01-16

## 2024-01-12 RX ORDER — FAMOTIDINE 10 MG/ML
20 INJECTION INTRAVENOUS ONCE
Refills: 0 | Status: COMPLETED | OUTPATIENT
Start: 2024-01-12 | End: 2024-01-12

## 2024-01-12 RX ADMIN — Medication 100 MILLIEQUIVALENT(S): at 11:36

## 2024-01-12 RX ADMIN — Medication 15 MILLIGRAM(S): at 20:52

## 2024-01-12 RX ADMIN — Medication 15 MILLIGRAM(S): at 19:41

## 2024-01-12 RX ADMIN — HYDROMORPHONE HYDROCHLORIDE 1 MILLIGRAM(S): 2 INJECTION INTRAMUSCULAR; INTRAVENOUS; SUBCUTANEOUS at 00:00

## 2024-01-12 RX ADMIN — ONDANSETRON 4 MILLIGRAM(S): 8 TABLET, FILM COATED ORAL at 06:54

## 2024-01-12 RX ADMIN — HYDROMORPHONE HYDROCHLORIDE 1 MILLIGRAM(S): 2 INJECTION INTRAMUSCULAR; INTRAVENOUS; SUBCUTANEOUS at 18:24

## 2024-01-12 RX ADMIN — CEFTRIAXONE 100 MILLIGRAM(S): 500 INJECTION, POWDER, FOR SOLUTION INTRAMUSCULAR; INTRAVENOUS at 05:42

## 2024-01-12 RX ADMIN — Medication 400 MILLIGRAM(S): at 23:08

## 2024-01-12 RX ADMIN — HEPARIN SODIUM 5000 UNIT(S): 5000 INJECTION INTRAVENOUS; SUBCUTANEOUS at 23:06

## 2024-01-12 RX ADMIN — DEXTROSE MONOHYDRATE, SODIUM CHLORIDE, AND POTASSIUM CHLORIDE 125 MILLILITER(S): 50; .745; 4.5 INJECTION, SOLUTION INTRAVENOUS at 19:46

## 2024-01-12 RX ADMIN — Medication 5 MILLIGRAM(S): at 19:14

## 2024-01-12 RX ADMIN — FAMOTIDINE 20 MILLIGRAM(S): 10 INJECTION INTRAVENOUS at 18:29

## 2024-01-12 RX ADMIN — Medication 12.5 MILLIGRAM(S): at 18:28

## 2024-01-12 RX ADMIN — Medication 100 MILLIEQUIVALENT(S): at 09:30

## 2024-01-12 RX ADMIN — Medication 25 GRAM(S): at 06:20

## 2024-01-12 RX ADMIN — ONDANSETRON 4 MILLIGRAM(S): 8 TABLET, FILM COATED ORAL at 18:28

## 2024-01-12 RX ADMIN — Medication 15 MILLIGRAM(S): at 23:09

## 2024-01-12 NOTE — BRIEF OPERATIVE NOTE - NSICDXBRIEFPOSTOP_GEN_ALL_CORE_FT
POST-OP DIAGNOSIS:  Fibroid uterus 12-Jan-2024 14:16:33  Michelle Eckert  Postmenopausal bleeding 12-Jan-2024 14:16:39  Michelle Eckert

## 2024-01-12 NOTE — BRIEF OPERATIVE NOTE - OPERATION/FINDINGS
s/p diagnostic laparoscopy converted to exploratory laparotomy, extensive lysis of adhesions secondary to previous laparotomies including mesh. 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 a partial takedown of the anastomoses was completed by distal transection of the anastomoses leaving a long hartmans. the proximal aspect of the anastomoses was then brought up an as end ileostomy. the isolated piece of bowel containing the anastomoses was then partially closed and a tongue of omentum was draped (in order to help contain any remaining mucous production, as it is defunctionalized).
EUA: external genitalia wnl, 12cm anteverted mobile uterus, adnexa non-palpable b/l. Good hemostasis noted.

## 2024-01-12 NOTE — PROGRESS NOTE ADULT - ASSESSMENT
73F with PMHx of HTN, COPD, fibroids and PSHx of Rt. hemicolectomy (04/2023) presents for abdominal pain, nausea, vomiting and ongoing vaginal bleeding since December 12th. CT scan shows high-grade SBO with a transition point located in the distal small bowel, just proximal to the ileocolic anastomosis, repeat CT showing worsening of the SBO, planning to take to OR    Plan:  - OR planning today for dx lap, possible ex lap, possible ostomy   - Replace NGT output 1/2:1cc  - NPO, NGT, IVF  - Abdominal exam prior to pain medications  - OOB ambulate with assistance  - strict I's/O's  - Appreciate gynteam input  - PT recommends no skilled needs    A-Team  [id number]   73F with PMHx of HTN, COPD, fibroids and PSHx of Rt. hemicolectomy (04/2023) presents for abdominal pain, nausea, vomiting and ongoing vaginal bleeding since December 12th. CT scan shows high-grade SBO with a transition point located in the distal small bowel, just proximal to the ileocolic anastomosis, repeat CT showing worsening of the SBO, planning to take to OR    Plan:  - OR planning today for dx lap, possible ex lap, possible ostomy   - Replace NGT output 1/2:1cc  - NPO, NGT, IVF  - Abdominal exam prior to pain medications  - OOB ambulate with assistance  - strict I's/O's  - Appreciate gynteam input  - PT recommends no skilled needs    A-Team  [id number]   73F with PMHx of HTN, COPD, fibroids and PSHx of Rt. hemicolectomy (04/2023) presents for abdominal pain, nausea, vomiting and ongoing vaginal bleeding since December 12th. CT scan shows high-grade SBO with a transition point located in the distal small bowel, just proximal to the ileocolic anastomosis, repeat CT showing worsening of the SBO, planning to take to OR    Plan:  - OR planning today for dx lap, possible ex lap, possible ostomy -- GYN to perform D/C and endometrial biopsy intraop  - Replace NGT output 1/2:1cc  - NPO, NGT, IVF  - Abdominal exam prior to pain medications  - OOB ambulate with assistance  - strict I's/O's  - Appreciate gynteam input  - PT recommends no skilled needs    A-Team  [id number]   73F with PMHx of HTN, COPD, fibroids and PSHx of Rt. hemicolectomy (04/2023) presents for abdominal pain, nausea, vomiting and ongoing vaginal bleeding since December 12th. CT scan shows high-grade SBO with a transition point located in the distal small bowel, just proximal to the ileocolic anastomosis, repeat CT showing worsening of the SBO, planning to take to OR    Plan:  - OR planning today for dx lap, possible ex lap, possible ostomy -- GYN to perform D/C and endometrial biopsy intraop  - Replace NGT output 1/2:1cc  - NPO, NGT, IVF  - Abdominal exam prior to pain medications  - OOB ambulate with assistance  - strict I's/O's  - Appreciate gynteam input  - PT recommends no skilled needs    A-Team  [id number]

## 2024-01-12 NOTE — PRE-OP CHECKLIST - DNR CLARIFICATION FORM COMPLETED
Bard Devlin one week ago and injured left shoulder.   Had negative xray at time of fall but no follow up n/a

## 2024-01-12 NOTE — PROGRESS NOTE ADULT - SUBJECTIVE AND OBJECTIVE BOX
TEAM [ A ] Surgery Daily Progress Note  =====================================================    SUBJECTIVE: Patient seen and examined at bedside on AM rounds. Patient reports that they're feeling well. Denies fever, chills, N/V, chest pain, SOB    ALLERGIES:  No Known Allergies      --------------------------------------------------------------------------------------    MEDICATIONS:  cefTRIAXone   IVPB 1000 milliGRAM(s) IV Intermittent every 24 hours  dextrose 5% + sodium chloride 0.45% with potassium chloride 20 mEq/L 1000 milliLiter(s) IV Continuous <Continuous>  heparin   Injectable 5000 Unit(s) SubCutaneous every 8 hours  magnesium sulfate  IVPB 2 Gram(s) IV Intermittent once  metoprolol tartrate Injectable 5 milliGRAM(s) IV Push every 6 hours  pantoprazole  Injectable 40 milliGRAM(s) IV Push daily  potassium chloride  10 mEq/100 mL IVPB 10 milliEquivalent(s) IV Intermittent every 1 hour    --------------------------------------------------------------------------------------    VITAL SIGNS:  T(C): 36.6 (01-12-24 @ 02:00), Max: 36.6 (01-11-24 @ 10:00)  HR: 98 (01-12-24 @ 02:00) (80 - 98)  BP: 107/72 (01-12-24 @ 02:00) (107/72 - 127/69)  RR: 18 (01-12-24 @ 02:00) (17 - 18)  SpO2: 99% (01-12-24 @ 02:00) (98% - 100%)  --------------------------------------------------------------------------------------    INS AND OUTS:    01-10-24 @ 07:01  -  01-11-24 @ 07:00  --------------------------------------------------------  IN: 2280 mL / OUT: 3192 mL / NET: -912 mL    01-11-24 @ 07:01  -  01-12-24 @ 05:56  --------------------------------------------------------  IN: 3250 mL / OUT: 1803 mL / NET: 1447 mL      --------------------------------------------------------------------------------------      EXAM    General: NAD, resting in bed comfortably.  Cardiac: regular rate, warm and well perfused  Respiratory: Nonlabored respirations, normal cw expansion.  Abdomen: soft, nontender, nondistended. ___ incision is c/d/i, ostomy, farhan ALBERTS.   Extremities: normal strength, FROM, no deformities    --------------------------------------------------------------------------------------    LABS       TEAM [ A ] Surgery Daily Progress Note  =====================================================    SUBJECTIVE: Patient seen and examined at bedside on AM rounds. Patient reports nausea. NGT remains to LCWS, no reported emesis this morning    ALLERGIES:  No Known Allergies      --------------------------------------------------------------------------------------    MEDICATIONS:  cefTRIAXone   IVPB 1000 milliGRAM(s) IV Intermittent every 24 hours  dextrose 5% + sodium chloride 0.45% with potassium chloride 20 mEq/L 1000 milliLiter(s) IV Continuous <Continuous>  heparin   Injectable 5000 Unit(s) SubCutaneous every 8 hours  magnesium sulfate  IVPB 2 Gram(s) IV Intermittent once  metoprolol tartrate Injectable 5 milliGRAM(s) IV Push every 6 hours  pantoprazole  Injectable 40 milliGRAM(s) IV Push daily  potassium chloride  10 mEq/100 mL IVPB 10 milliEquivalent(s) IV Intermittent every 1 hour    --------------------------------------------------------------------------------------    VITAL SIGNS:  T(C): 36.6 (01-12-24 @ 02:00), Max: 36.6 (01-11-24 @ 10:00)  HR: 98 (01-12-24 @ 02:00) (80 - 98)  BP: 107/72 (01-12-24 @ 02:00) (107/72 - 127/69)  RR: 18 (01-12-24 @ 02:00) (17 - 18)  SpO2: 99% (01-12-24 @ 02:00) (98% - 100%)  --------------------------------------------------------------------------------------    INS AND OUTS:    01-10-24 @ 07:01 - 01-11-24 @ 07:00  --------------------------------------------------------  IN: 2280 mL / OUT: 3192 mL / NET: -912 mL    01-11-24 @ 07:01  -  01-12-24 @ 05:56  --------------------------------------------------------  IN: 3250 mL / OUT: 1803 mL / NET: 1447 mL      --------------------------------------------------------------------------------------      EXAM    General: NAD, resting in bed comfortably.  Cardiac: regular rate, warm and well perfused  Respiratory: Nonlabored respirations, normal cw expansion.  Abdomen: soft, nontender, nondistended, NGT to LCWS  Extremities: normal strength, FROM, no deformities    --------------------------------------------------------------------------------------    LABS                      10.8   7.69  )-----------( 211      ( 12 Jan 2024 02:55 )             34.9   01-12    143  |  103  |  16  ----------------------------<  115<H>  3.7   |  27  |  1.02    Ca    8.6      12 Jan 2024 02:55  Phos  3.5     01-12  Mg     1.80     01-12

## 2024-01-12 NOTE — PRE-OP CHECKLIST - SELECT TESTS ORDERED
BMP/CBC/PT/PTT/INR/Type and Screen BMP/CBC/PT/PTT/INR/Type and Screen/EKG  at 1223/BMP/CBC/PT/PTT/INR/Type and Screen/EKG/POCT Blood Glucose

## 2024-01-12 NOTE — CHART NOTE - NSCHARTNOTEFT_GEN_A_CORE
Post Operative Note  Patient: JUSTINE ANAYA 73y (1950) Female   MRN: [id number]  Location: [facility] 13  Visit: 01-05-24 Inpatient  Date: 01-12-24 @ 21:24    Procedure: S/P ***    Subjective: Patient seen and examined post operatively. Reports pain as controlled. Denies nausea, vomiting, fever, chills, chest pain, SOB, cough.      Objective:  Vitals: T(F): 97.6 (01-12-24 @ 18:00), Max: 98.2 (01-12-24 @ 05:30)  HR: 87 (01-12-24 @ 21:00)  BP: 105/72 (01-12-24 @ 21:00) (96/63 - 131/71)  RR: 10 (01-12-24 @ 21:00)  SpO2: 99% (01-12-24 @ 21:00)  Vent Settings:     In:   01-11-24 @ 07:01  -  01-12-24 @ 07:00  --------------------------------------------------------  IN: 3850 mL    01-12-24 @ 07:01  -  01-12-24 @ 21:24  --------------------------------------------------------  IN: 350 mL      IV Fluids: dextrose 5% + sodium chloride 0.45% with potassium chloride 20 mEq/L 1000 milliLiter(s) (125 mL/Hr) IV Continuous <Continuous>      Out:   01-11-24 @ 07:01  -  01-12-24 @ 07:00  --------------------------------------------------------  OUT: 2003 mL    01-12-24 @ 07:01  -  01-12-24 @ 21:24  --------------------------------------------------------  OUT: 375 mL      EBL:     Voided Urine:   01-11-24 @ 07:01  -  01-12-24 @ 07:00  --------------------------------------------------------  OUT: 2003 mL    01-12-24 @ 07:01  -  01-12-24 @ 21:24  --------------------------------------------------------  OUT: 375 mL      Kitchen Catheter: yes no   Drains:   ZEKE:    ,   Chest Tube:      NG Tube:   01-11-24 @ 07:01  -  01-12-24 @ 07:00  --------------------------------------------------------  OUT: 2000 mL    01-12-24 @ 07:01  -  01-12-24 @ 21:24  --------------------------------------------------------  OUT: 100 mL          Physical Examination:  General: NAD, resting comfortably in bed  Respiratory: Nonlabored respirations, normal CW expansion.  Cardio: regular rate   Abdomen: soft, non-distended, appropriately tender, surgical incisions are c/d/i.        Imaging:  No post-op imaging studies    Assessment:  73yFemale patient S/P *** for ***    Plan:  - IV Abx:   - Pain control PRN  - Diet:  - IVF  - Kitchen, DTV  - Activity:  - DVT ppx:      Date/Time: 01-12-24 @ 21:24 Post Operative Note  Patient: JUSTINE ANAYA 73y (1950) Female   MRN: [id number]  Location: [facility] 13  Visit: 01-05-24 Inpatient  Date: 01-12-24 @ 21:24    Procedure: S/P ***    Subjective: Patient seen and examined post operatively. Reports pain as controlled. Denies nausea, vomiting, fever, chills, chest pain, SOB, cough.      Objective:  Vitals: T(F): 97.6 (01-12-24 @ 18:00), Max: 98.2 (01-12-24 @ 05:30)  HR: 87 (01-12-24 @ 21:00)  BP: 105/72 (01-12-24 @ 21:00) (96/63 - 131/71)  RR: 10 (01-12-24 @ 21:00)  SpO2: 99% (01-12-24 @ 21:00)  Vent Settings:     In:   01-11-24 @ 07:01  -  01-12-24 @ 07:00  --------------------------------------------------------  IN: 3850 mL    01-12-24 @ 07:01  -  01-12-24 @ 21:24  --------------------------------------------------------  IN: 350 mL      IV Fluids: dextrose 5% + sodium chloride 0.45% with potassium chloride 20 mEq/L 1000 milliLiter(s) (125 mL/Hr) IV Continuous <Continuous>      Out:   01-11-24 @ 07:01  -  01-12-24 @ 07:00  --------------------------------------------------------  OUT: 2003 mL    01-12-24 @ 07:01  -  01-12-24 @ 21:24  --------------------------------------------------------  OUT: 375 mL      EBL:     Voided Urine:   01-11-24 @ 07:01  -  01-12-24 @ 07:00  --------------------------------------------------------  OUT: 2003 mL    01-12-24 @ 07:01  -  01-12-24 @ 21:24  --------------------------------------------------------  OUT: 375 mL      Kitchen Catheter: yes no   Drains:   ZEKE:    ,   Chest Tube:      NG Tube:   01-11-24 @ 07:01  -  01-12-24 @ 07:00  --------------------------------------------------------  OUT: 2000 mL    01-12-24 @ 07:01  -  01-12-24 @ 21:24  --------------------------------------------------------  OUT: 100 mL          Physical Examination:  General: NAD, resting comfortably in bed  Respiratory: Nonlabored respirations, normal CW expansion.  Cardio: regular rate   Abdomen: soft, non-distended, appropriately tender, surgical incisions are c/d/i.        Imaging:  No post-op imaging studies    Assessment:  73yFemale patient S/P *** for ***    Plan:  - IV Abx:   - Pain control PRN  - Diet:  - IVF  - Kitchen, DTV  - Activity:  - DVT ppx:      Date/Time: 01-12-24 @ 21:24 Post Operative Note  Patient: JUSTINE ANAYA 73y (1950) Female   MRN: [id number]  Location: [place] [address]  Visit: 01-05-24 Inpatient  Date: 01-12-24 @ 21:24    Procedure: S/P diagnostic laparoscopy converted to open exploratory laparotomy, extensive lysis of adhesions, partial resection of ileocolic anastomosis, and end ileostomy creation on 1/12    Subjective: Patient seen and examined post operatively. Reports pain as controlled. Denies nausea, vomiting, fever, chills, chest pain, SOB, cough.      Objective:  Vitals: T(F): 97.6 (01-12-24 @ 18:00), Max: 98.2 (01-12-24 @ 05:30)  HR: 87 (01-12-24 @ 21:00)  BP: 105/72 (01-12-24 @ 21:00) (96/63 - 131/71)  RR: 10 (01-12-24 @ 21:00)  SpO2: 99% (01-12-24 @ 21:00)  Vent Settings:     In:   01-11-24 @ 07:01  -  01-12-24 @ 07:00  --------------------------------------------------------  IN: 3850 mL    01-12-24 @ 07:01 - 01-12-24 @ 21:24  --------------------------------------------------------  IN: 350 mL      IV Fluids: dextrose 5% + sodium chloride 0.45% with potassium chloride 20 mEq/L 1000 milliLiter(s) (125 mL/Hr) IV Continuous <Continuous>      Out:   01-11-24 @ 07:01  -  01-12-24 @ 07:00  --------------------------------------------------------  OUT: 2003 mL    01-12-24 @ 07:01  -  01-12-24 @ 21:24  --------------------------------------------------------  OUT: 375 mL      EBL:     Voided Urine:   01-11-24 @ 07:01  -  01-12-24 @ 07:00  --------------------------------------------------------  OUT: 2003 mL    01-12-24 @ 07:01 - 01-12-24 @ 21:24  --------------------------------------------------------  OUT: 375 mL      Kicthen Catheter: yes      ,        NG Tube:   01-11-24 @ 07:01  -  01-12-24 @ 07:00  --------------------------------------------------------  OUT: 2000 mL    01-12-24 @ 07:01 - 01-12-24 @ 21:24  --------------------------------------------------------  OUT: 100 mL          Physical Examination:  General: NAD, resting comfortably in bed. NGT in place.   Respiratory: Nonlabored respirations, normal CW expansion.  Cardio: regular rate   Abdomen: soft, non-distended, appropriately tender, surgical incisions are c/d/i. midline incision with penrose drain.   : Kitchen in      Imaging:  No post-op imaging studies    Assessment:  73yFemale patient S/P diagnostic laparoscopy converted to open exploratory laparotomy, extensive lysis of adhesions, partial resection of ileocolic anastomosis, and end ileostomy creation on 1/12    Plan:  - NPO, NGT  - IVF @ 125  - Pain control PRN  (Ok to start Tylenol + Toradol)  - Kitchen in, monitor u/o  - Has penrose drain alongside midline incision site - stays in for now.  - Will need ostomy teaching   - PT c/s  - DVT ppx:  SQH    A Team Surgery   [id number]    Date/Time: 01-12-24 @ 21:24 Post Operative Note  Patient: JUSTINE ANAYA 73y (1950) Female   MRN: [id number]  Location: [place] [address]  Visit: 01-05-24 Inpatient  Date: 01-12-24 @ 21:24    Procedure: S/P diagnostic laparoscopy converted to open exploratory laparotomy, extensive lysis of adhesions, partial resection of ileocolic anastomosis, and end ileostomy creation on 1/12    Subjective: Patient seen and examined post operatively. Reports pain as controlled. Denies nausea, vomiting, fever, chills, chest pain, SOB, cough.      Objective:  Vitals: T(F): 97.6 (01-12-24 @ 18:00), Max: 98.2 (01-12-24 @ 05:30)  HR: 87 (01-12-24 @ 21:00)  BP: 105/72 (01-12-24 @ 21:00) (96/63 - 131/71)  RR: 10 (01-12-24 @ 21:00)  SpO2: 99% (01-12-24 @ 21:00)  Vent Settings:     In:   01-11-24 @ 07:01  -  01-12-24 @ 07:00  --------------------------------------------------------  IN: 3850 mL    01-12-24 @ 07:01 - 01-12-24 @ 21:24  --------------------------------------------------------  IN: 350 mL      IV Fluids: dextrose 5% + sodium chloride 0.45% with potassium chloride 20 mEq/L 1000 milliLiter(s) (125 mL/Hr) IV Continuous <Continuous>      Out:   01-11-24 @ 07:01  -  01-12-24 @ 07:00  --------------------------------------------------------  OUT: 2003 mL    01-12-24 @ 07:01  -  01-12-24 @ 21:24  --------------------------------------------------------  OUT: 375 mL      EBL:     Voided Urine:   01-11-24 @ 07:01  -  01-12-24 @ 07:00  --------------------------------------------------------  OUT: 2003 mL    01-12-24 @ 07:01 - 01-12-24 @ 21:24  --------------------------------------------------------  OUT: 375 mL      Kitchen Catheter: yes      ,        NG Tube:   01-11-24 @ 07:01  -  01-12-24 @ 07:00  --------------------------------------------------------  OUT: 2000 mL    01-12-24 @ 07:01 - 01-12-24 @ 21:24  --------------------------------------------------------  OUT: 100 mL          Physical Examination:  General: NAD, resting comfortably in bed. NGT in place.   Respiratory: Nonlabored respirations, normal CW expansion.  Cardio: regular rate   Abdomen: soft, non-distended, appropriately tender, surgical incisions are c/d/i. midline incision with penrose drain.   : Kitchen in      Imaging:  No post-op imaging studies    Assessment:  73yFemale patient S/P diagnostic laparoscopy converted to open exploratory laparotomy, extensive lysis of adhesions, partial resection of ileocolic anastomosis, and end ileostomy creation on 1/12    Plan:  - NPO, NGT  - IVF @ 125  - Pain control PRN  (Ok to start Tylenol + Toradol)  - Kitchen in, monitor u/o  - Has penrose drain alongside midline incision site - stays in for now.  - Will need ostomy teaching   - PT c/s  - DVT ppx:  SQH    A Team Surgery   [id number]    Date/Time: 01-12-24 @ 21:24 Post Operative Note  Patient: JUSTINE ANAYA 73y (1950) Female   MRN: [id number]  Location: [place] [address]  Visit: 01-05-24 Inpatient  Date: 01-12-24 @ 21:24    Procedure: S/P diagnostic laparoscopy converted to open exploratory laparotomy, extensive lysis of adhesions, partial resection of ileocolic anastomosis, and end ileostomy creation on 1/12    Subjective: Patient seen and examined post operatively. Reports pain as controlled. Denies nausea, vomiting, fever, chills, chest pain, SOB.      Objective:  Vitals: T(F): 97.6 (01-12-24 @ 18:00), Max: 98.2 (01-12-24 @ 05:30)  HR: 87 (01-12-24 @ 21:00)  BP: 105/72 (01-12-24 @ 21:00) (96/63 - 131/71)  RR: 10 (01-12-24 @ 21:00)  SpO2: 99% (01-12-24 @ 21:00)  Vent Settings:     In:   01-11-24 @ 07:01  -  01-12-24 @ 07:00  --------------------------------------------------------  IN: 3850 mL    01-12-24 @ 07:01 - 01-12-24 @ 21:24  --------------------------------------------------------  IN: 350 mL      IV Fluids: dextrose 5% + sodium chloride 0.45% with potassium chloride 20 mEq/L 1000 milliLiter(s) (125 mL/Hr) IV Continuous <Continuous>      Out:   01-11-24 @ 07:01  -  01-12-24 @ 07:00  --------------------------------------------------------  OUT: 2003 mL    01-12-24 @ 07:01 - 01-12-24 @ 21:24  --------------------------------------------------------  OUT: 375 mL      EBL:     Voided Urine:   01-11-24 @ 07:01  -  01-12-24 @ 07:00  --------------------------------------------------------  OUT: 2003 mL    01-12-24 @ 07:01 - 01-12-24 @ 21:24  --------------------------------------------------------  OUT: 375 mL      Kitchen Catheter: yes      ,        NG Tube:   01-11-24 @ 07:01  -  01-12-24 @ 07:00  --------------------------------------------------------  OUT: 2000 mL    01-12-24 @ 07:01 - 01-12-24 @ 21:24  --------------------------------------------------------  OUT: 100 mL          Physical Examination:  General: NAD, resting comfortably in bed. NGT in place to wall suction with bilious output   Respiratory: Nonlabored respirations, normal CW expansion.  Cardio: regular rate   Abdomen: soft, moderately distended, non-tender, laparoscopic surgical incision sites are c/d/i. midline incision covered with pink soaked gauze and Tegaderm with minimal sanguinous fluid contained within Tegaderm dressing inferiorly. No evidence of active bleeding. Ostomy intact with yellow liquid in bag.  : Kitchen in with clear yellow urine output.       Imaging:  No post-op imaging studies    Assessment:  73yFemale patient S/P diagnostic laparoscopy converted to open exploratory laparotomy, extensive lysis of adhesions, partial resection of ileocolic anastomosis, and end ileostomy creation on 1/12    Plan:  - NPO, NGT  - monitor NGT o/p  - IVF @ 125  - Pain control PRN  (Ok to start Tylenol + Toradol)  - Kitchen in, monitor u/o  - Has penrose drain alongside midline incision site - stays in for now.  - Monitor ostomy o/p  - Will need ostomy teaching   - PT c/s  - DVT ppx:  H    A Team Surgery   [id number]    Date/Time: 01-12-24 @ 21:24 Post Operative Note  Patient: JUSTINE ANAYA 73y (1950) Female   MRN: [id number]  Location: [place] [address]  Visit: 01-05-24 Inpatient  Date: 01-12-24 @ 21:24    Procedure: S/P diagnostic laparoscopy converted to open exploratory laparotomy, extensive lysis of adhesions, partial resection of ileocolic anastomosis, and end ileostomy creation on 1/12    Subjective: Patient seen and examined post operatively. Reports pain as controlled. Denies nausea, vomiting, fever, chills, chest pain, SOB.      Objective:  Vitals: T(F): 97.6 (01-12-24 @ 18:00), Max: 98.2 (01-12-24 @ 05:30)  HR: 87 (01-12-24 @ 21:00)  BP: 105/72 (01-12-24 @ 21:00) (96/63 - 131/71)  RR: 10 (01-12-24 @ 21:00)  SpO2: 99% (01-12-24 @ 21:00)  Vent Settings:     In:   01-11-24 @ 07:01  -  01-12-24 @ 07:00  --------------------------------------------------------  IN: 3850 mL    01-12-24 @ 07:01 - 01-12-24 @ 21:24  --------------------------------------------------------  IN: 350 mL      IV Fluids: dextrose 5% + sodium chloride 0.45% with potassium chloride 20 mEq/L 1000 milliLiter(s) (125 mL/Hr) IV Continuous <Continuous>      Out:   01-11-24 @ 07:01  -  01-12-24 @ 07:00  --------------------------------------------------------  OUT: 2003 mL    01-12-24 @ 07:01 - 01-12-24 @ 21:24  --------------------------------------------------------  OUT: 375 mL      EBL:     Voided Urine:   01-11-24 @ 07:01  -  01-12-24 @ 07:00  --------------------------------------------------------  OUT: 2003 mL    01-12-24 @ 07:01 - 01-12-24 @ 21:24  --------------------------------------------------------  OUT: 375 mL      Kitchen Catheter: yes      ,        NG Tube:   01-11-24 @ 07:01  -  01-12-24 @ 07:00  --------------------------------------------------------  OUT: 2000 mL    01-12-24 @ 07:01 - 01-12-24 @ 21:24  --------------------------------------------------------  OUT: 100 mL          Physical Examination:  General: NAD, resting comfortably in bed. NGT in place to wall suction with bilious output   Respiratory: Nonlabored respirations, normal CW expansion.  Cardio: regular rate   Abdomen: soft, moderately distended, non-tender, laparoscopic surgical incision sites are c/d/i. midline incision covered with pink soaked gauze and Tegaderm with minimal sanguinous fluid contained within Tegaderm dressing inferiorly. No evidence of active bleeding. Ostomy intact with yellow liquid in bag.  : Kitchen in with clear yellow urine output.       Imaging:  No post-op imaging studies    Assessment:  73yFemale patient S/P diagnostic laparoscopy converted to open exploratory laparotomy, extensive lysis of adhesions, partial resection of ileocolic anastomosis, and end ileostomy creation on 1/12    Plan:  - NPO, NGT  - monitor NGT o/p  - IVF @ 125  - Pain control PRN  (Ok to start Tylenol + Toradol)  - Kitchen in, monitor u/o  - Has penrose drain alongside midline incision site - stays in for now.  - Monitor ostomy o/p  - Will need ostomy teaching   - PT c/s  - DVT ppx:  H    A Team Surgery   [id number]    Date/Time: 01-12-24 @ 21:24

## 2024-01-12 NOTE — BRIEF OPERATIVE NOTE - NSICDXBRIEFPREOP_GEN_ALL_CORE_FT
PRE-OP DIAGNOSIS:  Fibroids 12-Jan-2024 14:16:25  Michelle Eckert  Postmenopausal bleeding 12-Jan-2024 14:16:28  Michelle Eckert

## 2024-01-12 NOTE — PROVIDER CONTACT NOTE (OTHER) - SITUATION
Pt scheduled for 0600 lopressor IVP and heparin subq. Pt refusing medication.  Pt has abx, and replenishments ordered.

## 2024-01-12 NOTE — BRIEF OPERATIVE NOTE - NSICDXBRIEFPROCEDURE_GEN_ALL_CORE_FT
PROCEDURES:  Exam under anesthesia, pelvic 12-Jan-2024 14:14:32  Michelle Eckert  Pap smear cervix 12-Jan-2024 14:14:41  Michelle Eckert  Dilation and curettage of uterus with endometrial biopsy 12-Jan-2024 14:16:19  Michelle Eckert

## 2024-01-13 LAB
ANION GAP SERPL CALC-SCNC: 11 MMOL/L — SIGNIFICANT CHANGE UP (ref 7–14)
ANION GAP SERPL CALC-SCNC: 11 MMOL/L — SIGNIFICANT CHANGE UP (ref 7–14)
BUN SERPL-MCNC: 16 MG/DL — SIGNIFICANT CHANGE UP (ref 7–23)
BUN SERPL-MCNC: 16 MG/DL — SIGNIFICANT CHANGE UP (ref 7–23)
CALCIUM SERPL-MCNC: 7.7 MG/DL — LOW (ref 8.4–10.5)
CALCIUM SERPL-MCNC: 7.7 MG/DL — LOW (ref 8.4–10.5)
CHLORIDE SERPL-SCNC: 101 MMOL/L — SIGNIFICANT CHANGE UP (ref 98–107)
CHLORIDE SERPL-SCNC: 101 MMOL/L — SIGNIFICANT CHANGE UP (ref 98–107)
CO2 SERPL-SCNC: 23 MMOL/L — SIGNIFICANT CHANGE UP (ref 22–31)
CO2 SERPL-SCNC: 23 MMOL/L — SIGNIFICANT CHANGE UP (ref 22–31)
CREAT SERPL-MCNC: 1.35 MG/DL — HIGH (ref 0.5–1.3)
CREAT SERPL-MCNC: 1.35 MG/DL — HIGH (ref 0.5–1.3)
EGFR: 42 ML/MIN/1.73M2 — LOW
EGFR: 42 ML/MIN/1.73M2 — LOW
GLUCOSE SERPL-MCNC: 93 MG/DL — SIGNIFICANT CHANGE UP (ref 70–99)
GLUCOSE SERPL-MCNC: 93 MG/DL — SIGNIFICANT CHANGE UP (ref 70–99)
HCT VFR BLD CALC: 30.3 % — LOW (ref 34.5–45)
HCT VFR BLD CALC: 30.3 % — LOW (ref 34.5–45)
HCT VFR BLD CALC: 31.6 % — LOW (ref 34.5–45)
HCT VFR BLD CALC: 31.6 % — LOW (ref 34.5–45)
HGB BLD-MCNC: 9.4 G/DL — LOW (ref 11.5–15.5)
HGB BLD-MCNC: 9.4 G/DL — LOW (ref 11.5–15.5)
HGB BLD-MCNC: 9.6 G/DL — LOW (ref 11.5–15.5)
HGB BLD-MCNC: 9.6 G/DL — LOW (ref 11.5–15.5)
MAGNESIUM SERPL-MCNC: 1.7 MG/DL — SIGNIFICANT CHANGE UP (ref 1.6–2.6)
MAGNESIUM SERPL-MCNC: 1.7 MG/DL — SIGNIFICANT CHANGE UP (ref 1.6–2.6)
MCHC RBC-ENTMCNC: 25.8 PG — LOW (ref 27–34)
MCHC RBC-ENTMCNC: 25.8 PG — LOW (ref 27–34)
MCHC RBC-ENTMCNC: 25.9 PG — LOW (ref 27–34)
MCHC RBC-ENTMCNC: 25.9 PG — LOW (ref 27–34)
MCHC RBC-ENTMCNC: 30.4 GM/DL — LOW (ref 32–36)
MCHC RBC-ENTMCNC: 30.4 GM/DL — LOW (ref 32–36)
MCHC RBC-ENTMCNC: 31 GM/DL — LOW (ref 32–36)
MCHC RBC-ENTMCNC: 31 GM/DL — LOW (ref 32–36)
MCV RBC AUTO: 83.5 FL — SIGNIFICANT CHANGE UP (ref 80–100)
MCV RBC AUTO: 83.5 FL — SIGNIFICANT CHANGE UP (ref 80–100)
MCV RBC AUTO: 84.9 FL — SIGNIFICANT CHANGE UP (ref 80–100)
MCV RBC AUTO: 84.9 FL — SIGNIFICANT CHANGE UP (ref 80–100)
NRBC # BLD: 0 /100 WBCS — SIGNIFICANT CHANGE UP (ref 0–0)
NRBC # FLD: 0 K/UL — SIGNIFICANT CHANGE UP (ref 0–0)
PHOSPHATE SERPL-MCNC: 3.4 MG/DL — SIGNIFICANT CHANGE UP (ref 2.5–4.5)
PHOSPHATE SERPL-MCNC: 3.4 MG/DL — SIGNIFICANT CHANGE UP (ref 2.5–4.5)
PLATELET # BLD AUTO: 152 K/UL — SIGNIFICANT CHANGE UP (ref 150–400)
PLATELET # BLD AUTO: 152 K/UL — SIGNIFICANT CHANGE UP (ref 150–400)
PLATELET # BLD AUTO: 185 K/UL — SIGNIFICANT CHANGE UP (ref 150–400)
PLATELET # BLD AUTO: 185 K/UL — SIGNIFICANT CHANGE UP (ref 150–400)
POTASSIUM SERPL-MCNC: 4.5 MMOL/L — SIGNIFICANT CHANGE UP (ref 3.5–5.3)
POTASSIUM SERPL-MCNC: 4.5 MMOL/L — SIGNIFICANT CHANGE UP (ref 3.5–5.3)
POTASSIUM SERPL-SCNC: 4.5 MMOL/L — SIGNIFICANT CHANGE UP (ref 3.5–5.3)
POTASSIUM SERPL-SCNC: 4.5 MMOL/L — SIGNIFICANT CHANGE UP (ref 3.5–5.3)
RBC # BLD: 3.63 M/UL — LOW (ref 3.8–5.2)
RBC # BLD: 3.63 M/UL — LOW (ref 3.8–5.2)
RBC # BLD: 3.72 M/UL — LOW (ref 3.8–5.2)
RBC # BLD: 3.72 M/UL — LOW (ref 3.8–5.2)
RBC # FLD: 18.3 % — HIGH (ref 10.3–14.5)
RBC # FLD: 18.3 % — HIGH (ref 10.3–14.5)
RBC # FLD: 18.8 % — HIGH (ref 10.3–14.5)
RBC # FLD: 18.8 % — HIGH (ref 10.3–14.5)
SODIUM SERPL-SCNC: 135 MMOL/L — SIGNIFICANT CHANGE UP (ref 135–145)
SODIUM SERPL-SCNC: 135 MMOL/L — SIGNIFICANT CHANGE UP (ref 135–145)
WBC # BLD: 13.58 K/UL — HIGH (ref 3.8–10.5)
WBC # BLD: 13.58 K/UL — HIGH (ref 3.8–10.5)
WBC # BLD: 14.76 K/UL — HIGH (ref 3.8–10.5)
WBC # BLD: 14.76 K/UL — HIGH (ref 3.8–10.5)
WBC # FLD AUTO: 13.58 K/UL — HIGH (ref 3.8–10.5)
WBC # FLD AUTO: 13.58 K/UL — HIGH (ref 3.8–10.5)
WBC # FLD AUTO: 14.76 K/UL — HIGH (ref 3.8–10.5)
WBC # FLD AUTO: 14.76 K/UL — HIGH (ref 3.8–10.5)

## 2024-01-13 RX ORDER — MAGNESIUM SULFATE 500 MG/ML
2 VIAL (ML) INJECTION ONCE
Refills: 0 | Status: COMPLETED | OUTPATIENT
Start: 2024-01-13 | End: 2024-01-13

## 2024-01-13 RX ORDER — DEXTROSE MONOHYDRATE, SODIUM CHLORIDE, AND POTASSIUM CHLORIDE 50; .745; 4.5 G/1000ML; G/1000ML; G/1000ML
1000 INJECTION, SOLUTION INTRAVENOUS
Refills: 0 | Status: DISCONTINUED | OUTPATIENT
Start: 2024-01-13 | End: 2024-01-15

## 2024-01-13 RX ORDER — SODIUM CHLORIDE 9 MG/ML
2000 INJECTION, SOLUTION INTRAVENOUS ONCE
Refills: 0 | Status: COMPLETED | OUTPATIENT
Start: 2024-01-13 | End: 2024-01-13

## 2024-01-13 RX ORDER — SODIUM CHLORIDE 9 MG/ML
1000 INJECTION INTRAMUSCULAR; INTRAVENOUS; SUBCUTANEOUS ONCE
Refills: 0 | Status: COMPLETED | OUTPATIENT
Start: 2024-01-13 | End: 2024-01-13

## 2024-01-13 RX ADMIN — SODIUM CHLORIDE 500 MILLILITER(S): 9 INJECTION INTRAMUSCULAR; INTRAVENOUS; SUBCUTANEOUS at 15:24

## 2024-01-13 RX ADMIN — Medication 400 MILLIGRAM(S): at 18:19

## 2024-01-13 RX ADMIN — HEPARIN SODIUM 5000 UNIT(S): 5000 INJECTION INTRAVENOUS; SUBCUTANEOUS at 14:43

## 2024-01-13 RX ADMIN — Medication 15 MILLIGRAM(S): at 20:41

## 2024-01-13 RX ADMIN — Medication 1000 MILLIGRAM(S): at 18:45

## 2024-01-13 RX ADMIN — Medication 25 GRAM(S): at 12:39

## 2024-01-13 RX ADMIN — SODIUM CHLORIDE 1000 MILLILITER(S): 9 INJECTION, SOLUTION INTRAVENOUS at 07:06

## 2024-01-13 RX ADMIN — HEPARIN SODIUM 5000 UNIT(S): 5000 INJECTION INTRAVENOUS; SUBCUTANEOUS at 05:50

## 2024-01-13 RX ADMIN — Medication 400 MILLIGRAM(S): at 12:33

## 2024-01-13 RX ADMIN — Medication 15 MILLIGRAM(S): at 06:42

## 2024-01-13 RX ADMIN — CEFTRIAXONE 100 MILLIGRAM(S): 500 INJECTION, POWDER, FOR SOLUTION INTRAMUSCULAR; INTRAVENOUS at 05:47

## 2024-01-13 RX ADMIN — Medication 15 MILLIGRAM(S): at 20:11

## 2024-01-13 RX ADMIN — DEXTROSE MONOHYDRATE, SODIUM CHLORIDE, AND POTASSIUM CHLORIDE 125 MILLILITER(S): 50; .745; 4.5 INJECTION, SOLUTION INTRAVENOUS at 18:18

## 2024-01-13 RX ADMIN — Medication 400 MILLIGRAM(S): at 05:28

## 2024-01-13 RX ADMIN — PANTOPRAZOLE SODIUM 40 MILLIGRAM(S): 20 TABLET, DELAYED RELEASE ORAL at 12:38

## 2024-01-13 RX ADMIN — Medication 1000 MILLIGRAM(S): at 13:38

## 2024-01-13 RX ADMIN — HEPARIN SODIUM 5000 UNIT(S): 5000 INJECTION INTRAVENOUS; SUBCUTANEOUS at 22:07

## 2024-01-13 NOTE — PROGRESS NOTE ADULT - SUBJECTIVE AND OBJECTIVE BOX
TEAM [ A ] Surgery Daily Progress Note  =====================================================    SUBJECTIVE: Patient seen and examined at bedside on AM rounds. Patient reports that they're feeling well. NPO w/NGT with 400 cc light brown output. Denies nausea, vomiting. Ileostomy with 575 light brown fluid. OOB/Amublating as tolerated. Denies fever, chills. Hypotensive with low UOP before AM rounds. 2L bolus given over 2 hours.    PMH:   PAST MEDICAL & SURGICAL HISTORY:  HTN (hypertension)          ALLERGIES:  No Known Allergies      --------------------------------------------------------------------------------------    MEDICATIONS:    Neurologic Medications  acetaminophen   IVPB .. 1000 milliGRAM(s) IV Intermittent every 6 hours  HYDROmorphone  Injectable 0.5 milliGRAM(s) IV Push every 4 hours PRN Moderate Pain (4 - 6)  HYDROmorphone  Injectable 1 milliGRAM(s) IV Push every 3 hours PRN Severe Pain (7 - 10)  ketorolac   Injectable 15 milliGRAM(s) IV Push every 6 hours  morphine PF Spinal 0.2 milliGRAM(s) IntraThecal. once  ondansetron Injectable 4 milliGRAM(s) IV Push every 6 hours PRN Nausea    Respiratory Medications    Cardiovascular Medications  metoprolol tartrate Injectable 5 milliGRAM(s) IV Push every 6 hours    Gastrointestinal Medications  dextrose 5% + sodium chloride 0.45% with potassium chloride 20 mEq/L 1000 milliLiter(s) IV Continuous <Continuous>  magnesium sulfate  IVPB 2 Gram(s) IV Intermittent once  pantoprazole  Injectable 40 milliGRAM(s) IV Push daily    Genitourinary Medications    Hematologic/Oncologic Medications  heparin   Injectable 5000 Unit(s) SubCutaneous every 8 hours    Antimicrobial/Immunologic Medications  cefTRIAXone   IVPB 1000 milliGRAM(s) IV Intermittent every 24 hours    Endocrine/Metabolic Medications    Topical/Other Medications  naloxone Injectable 0.1 milliGRAM(s) IV Push every 3 minutes PRN For ANY of the following changes in patient status:  A. RR LESS THAN 10 breaths per minute, B. Oxygen saturation LESS THAN 90%, C. Sedation score of 6    --------------------------------------------------------------------------------------    VITAL SIGNS:    T(C): 36.7 (01-13-24 @ 05:40), Max: 36.8 (01-12-24 @ 11:11)  HR: 81 (01-13-24 @ 05:40) (81 - 105)  BP: 94/52 (01-13-24 @ 05:40) (94/52 - 131/71)  RR: 16 (01-13-24 @ 05:40) (10 - 20)  SpO2: 100% (01-13-24 @ 05:40) (92% - 100%)    --------------------------------------------------------------------------------------    Physical Examination:  General: NAD, resting comfortably in bed. NGT in place to wall suction with light brown output  Respiratory: Nonlabored respirations, normal CW expansion.  Cardio: RRR, hypotensive to 90s/50s.  Abdomen: soft, moderately distended, non-tender, laparoscopic surgical incision sites are c/d/i. midline incision covered with pink soaked gauze and Tegaderm with minimal sanguinous fluid contained within Tegaderm dressing inferiorly. Changed on rounds. Has a penrose drain in midline. No gross evidence of bleeding. Ileostomy intact with light brown liquid stool in bag and some mucous sloughing at site of ileostomy emerging from skin.  : Kitchen in with adrianna urine output.       --------------------------------------------------------------------------------------    LABS                          9.4    13.58 )-----------( 185      ( 13 Jan 2024 06:00 )             30.3   01-13    135  |  101  |  16  ----------------------------<  93  4.5   |  23  |  1.35<H>    Ca    7.7<L>      13 Jan 2024 06:00  Phos  3.4     01-13  Mg     1.70     01-13          --------------------------------------------------------------------------------------    INS AND OUTS:    01-12-24 @ 07:01 - 01-13-24 @ 07:00  --------------------------------------------------------  IN: 1595 mL / OUT: 2000 mL / NET: -405 mL    01-13-24 @ 07:01 - 01-13-24 @ 09:38  --------------------------------------------------------  IN: 0 mL / OUT: 300 mL / NET: -300 mL        --------------------------------------------------------------------------------------

## 2024-01-13 NOTE — PHYSICAL THERAPY INITIAL EVALUATION ADULT - NSPTDISCHREC_GEN_A_CORE
No skilled PT needs
To be determined pending functional mobility assessment and patient's ability to participate in PT.

## 2024-01-13 NOTE — PHYSICAL THERAPY INITIAL EVALUATION ADULT - DID THE PATIENT HAVE SURGERY?
s/p diagnostic laparoscopy converted to open exploratory laparotomy, extensive lysis of adhesions, partial resection of ileocolic anastomosis, and end ileostomy creation/yes

## 2024-01-13 NOTE — PHYSICAL THERAPY INITIAL EVALUATION ADULT - LEVEL OF INDEPENDENCE: GAIT, REHAB EVAL
supervision/contact guard
Pt deferred stating she will attempt to walk tomorrow. Pt deferred any OOB mobility despite encouragement

## 2024-01-13 NOTE — PROVIDER CONTACT NOTE (OTHER) - SITUATION
Pt real output @0230 75cc. Pt eral last emptied at 2200 1/12. Pt real output @0230 75cc. Pt real last emptied at 2200 1/12.

## 2024-01-13 NOTE — PHYSICAL THERAPY INITIAL EVALUATION ADULT - GENERAL OBSERVATIONS, REHAB EVAL
Pt received semi-supine in bed, with all lines intact, NAD, all precautions maintained.
Pt received semi-supine in bed, with all lines intact, NAD, all precautions maintained.

## 2024-01-13 NOTE — PHYSICAL THERAPY INITIAL EVALUATION ADULT - PERTINENT HX OF CURRENT PROBLEM, REHAB EVAL
A 73 year old female with PMHx of HTN, COPD, fibroids and PSHx of Rt. hemicolectomy (04/2023) presents for abdominal pain, nausea, vomiting and ongoing vaginal bleeding since December 12th. CT scan shows high-grade SBO with a transition point located in the distal small bowel, just proximal to the ileocolic anastomosis.
A 73 year old female with PMHx of HTN, COPD, fibroids and PSHx of Rt. hemicolectomy (04/2023) presents for abdominal pain, nausea, vomiting and ongoing vaginal bleeding since December 12th. CT scan shows high-grade SBO with a transition point located in the distal small bowel, just proximal to the ileocolic anastomosis.

## 2024-01-13 NOTE — PHYSICAL THERAPY INITIAL EVALUATION ADULT - LEVEL OF INDEPENDENCE: SUPINE/SIT, REHAB EVAL
Pt deferred stating she will attempt to walk tomorrow. Pt deferred any OOB mobility despite encouragement
supervision

## 2024-01-13 NOTE — PROGRESS NOTE ADULT - ASSESSMENT
73F with PMHx of HTN, COPD, fibroids and PSHx of Rt. hemicolectomy (04/2023) presents for abdominal pain, nausea, vomiting and ongoing vaginal bleeding since December 12th. 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       Plan:  - Replace NGT + ileostomy output 1/2:1cc q shift  - SqH  - CTX for UTI  - S/p 2L bolus, will ctm vitals  - Wound care to see  - NPO, NGT, mIVF  - Pain control PRN  - OOB ambulate with assistance  - strict I's/O's  - Appreciate gynteam input  - PT recommends no skilled needs    A-Team  [id number]   73F with PMHx of HTN, COPD, fibroids and PSHx of Rt. hemicolectomy (04/2023) presents for abdominal pain, nausea, vomiting and ongoing vaginal bleeding since December 12th. 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       Plan:  - Replace NGT + ileostomy output 1/2:1cc q shift  - SqH  - CTX for UTI  - S/p 2L bolus, will ctm vitals  - Wound care to see  - NPO, NGT, mIVF  - Pain control PRN  - OOB ambulate with assistance  - strict I's/O's  - Appreciate gynteam input  - PT recommends no skilled needs    A-Team  [id number]

## 2024-01-13 NOTE — PHYSICAL THERAPY INITIAL EVALUATION ADULT - ADDITIONAL COMMENTS
As per pt, pt lives in an apartment +elevator, alone. Prior to admission, pt was ambulating independently with a rollator.   Post PT evaluation, pt left semi-supine, alarm on, call bell and remote within reach, all precautions maintained, NAD. RN aware.
As per pt, pt lives in an apartment +elevator, alone. Prior to admission, pt was ambulating independently with a rollator.   Post PT evaluation, pt left semi-supine, alarm on, call bell and remote within reach, all precautions maintained, NAD. RN aware.

## 2024-01-13 NOTE — PROGRESS NOTE ADULT - SUBJECTIVE AND OBJECTIVE BOX
Anesthesia Pain Management Service    SUBJECTIVE: Patient s/p spinal morphine with pain managable and no problems.  Patient denies headache, has full sensation and able to move bilateral lower extremitites.   Pain Scale Score:  0/10   (x)Refer to charted pain scores    THERAPY:    s/p spinal PF morphine yesterday.      MEDICATIONS  (STANDING):  acetaminophen   IVPB .. 1000 milliGRAM(s) IV Intermittent every 6 hours  cefTRIAXone   IVPB 1000 milliGRAM(s) IV Intermittent every 24 hours  dextrose 5% + sodium chloride 0.45% with potassium chloride 20 mEq/L 1000 milliLiter(s) (125 mL/Hr) IV Continuous <Continuous>  heparin   Injectable 5000 Unit(s) SubCutaneous every 8 hours  ketorolac   Injectable 15 milliGRAM(s) IV Push every 6 hours  metoprolol tartrate Injectable 5 milliGRAM(s) IV Push every 6 hours  pantoprazole  Injectable 40 milliGRAM(s) IV Push daily    MEDICATIONS  (PRN):  HYDROmorphone  Injectable 0.5 milliGRAM(s) IV Push every 4 hours PRN Moderate Pain (4 - 6)  HYDROmorphone  Injectable 1 milliGRAM(s) IV Push every 3 hours PRN Severe Pain (7 - 10)  naloxone Injectable 0.1 milliGRAM(s) IV Push every 3 minutes PRN For ANY of the following changes in patient status:  A. RR LESS THAN 10 breaths per minute, B. Oxygen saturation LESS THAN 90%, C. Sedation score of 6  ondansetron Injectable 4 milliGRAM(s) IV Push every 6 hours PRN Nausea      OBJECTIVE:  Patient is sitting up in bed, NPO with NGT.    Sedation Score:	[ x] Alert	[ ] Drowsy	[ ] Arousable	[ ] Asleep	[ ] Unresponsive    Side Effects:	[ x] None	[ ] Nausea	[ ] Vomiting	[ ] Pruritus  		  [ ] Weakness		[ ] Numbness	[ ] Other:    Vital Signs Last 24 Hrs  T(C): 36.7 (13 Jan 2024 11:37), Max: 36.8 (13 Jan 2024 01:43)  T(F): 98 (13 Jan 2024 11:37), Max: 98.3 (13 Jan 2024 01:43)  HR: 87 (13 Jan 2024 11:37) (81 - 105)  BP: 97/54 (13 Jan 2024 11:37) (93/50 - 131/71)  BP(mean): 78 (12 Jan 2024 21:00) (60 - 99)  RR: 18 (13 Jan 2024 11:37) (10 - 20)  SpO2: 100% (13 Jan 2024 11:37) (92% - 100%)    Parameters below as of 13 Jan 2024 11:37  Patient On (Oxygen Delivery Method): room air        ASSESSMENT/ PLAN  [x ] Patient transitioned to prn analgesics  [x] Pain management per primary service, pain service to sign off   [x]Documentation and Verification of current medications     Comments: Pain service to sign off.  PRN Oral/IV opioids and/or non-opioid Adjuvant analgesics to be used at this point.    Progress Note written now but Patient was seen earlier.

## 2024-01-14 LAB
ANION GAP SERPL CALC-SCNC: 9 MMOL/L — SIGNIFICANT CHANGE UP (ref 7–14)
ANION GAP SERPL CALC-SCNC: 9 MMOL/L — SIGNIFICANT CHANGE UP (ref 7–14)
BUN SERPL-MCNC: 21 MG/DL — SIGNIFICANT CHANGE UP (ref 7–23)
BUN SERPL-MCNC: 21 MG/DL — SIGNIFICANT CHANGE UP (ref 7–23)
CALCIUM SERPL-MCNC: 8 MG/DL — LOW (ref 8.4–10.5)
CALCIUM SERPL-MCNC: 8 MG/DL — LOW (ref 8.4–10.5)
CHLORIDE SERPL-SCNC: 102 MMOL/L — SIGNIFICANT CHANGE UP (ref 98–107)
CHLORIDE SERPL-SCNC: 102 MMOL/L — SIGNIFICANT CHANGE UP (ref 98–107)
CO2 SERPL-SCNC: 24 MMOL/L — SIGNIFICANT CHANGE UP (ref 22–31)
CO2 SERPL-SCNC: 24 MMOL/L — SIGNIFICANT CHANGE UP (ref 22–31)
CREAT SERPL-MCNC: 1.2 MG/DL — SIGNIFICANT CHANGE UP (ref 0.5–1.3)
CREAT SERPL-MCNC: 1.2 MG/DL — SIGNIFICANT CHANGE UP (ref 0.5–1.3)
EGFR: 48 ML/MIN/1.73M2 — LOW
EGFR: 48 ML/MIN/1.73M2 — LOW
GLUCOSE SERPL-MCNC: 82 MG/DL — SIGNIFICANT CHANGE UP (ref 70–99)
GLUCOSE SERPL-MCNC: 82 MG/DL — SIGNIFICANT CHANGE UP (ref 70–99)
HCT VFR BLD CALC: 28.2 % — LOW (ref 34.5–45)
HCT VFR BLD CALC: 28.2 % — LOW (ref 34.5–45)
HGB BLD-MCNC: 8.7 G/DL — LOW (ref 11.5–15.5)
HGB BLD-MCNC: 8.7 G/DL — LOW (ref 11.5–15.5)
MAGNESIUM SERPL-MCNC: 2.2 MG/DL — SIGNIFICANT CHANGE UP (ref 1.6–2.6)
MAGNESIUM SERPL-MCNC: 2.2 MG/DL — SIGNIFICANT CHANGE UP (ref 1.6–2.6)
MCHC RBC-ENTMCNC: 25.7 PG — LOW (ref 27–34)
MCHC RBC-ENTMCNC: 25.7 PG — LOW (ref 27–34)
MCHC RBC-ENTMCNC: 30.9 GM/DL — LOW (ref 32–36)
MCHC RBC-ENTMCNC: 30.9 GM/DL — LOW (ref 32–36)
MCV RBC AUTO: 83.4 FL — SIGNIFICANT CHANGE UP (ref 80–100)
MCV RBC AUTO: 83.4 FL — SIGNIFICANT CHANGE UP (ref 80–100)
NRBC # BLD: 0 /100 WBCS — SIGNIFICANT CHANGE UP (ref 0–0)
NRBC # BLD: 0 /100 WBCS — SIGNIFICANT CHANGE UP (ref 0–0)
NRBC # FLD: 0 K/UL — SIGNIFICANT CHANGE UP (ref 0–0)
NRBC # FLD: 0 K/UL — SIGNIFICANT CHANGE UP (ref 0–0)
PHOSPHATE SERPL-MCNC: 3.2 MG/DL — SIGNIFICANT CHANGE UP (ref 2.5–4.5)
PHOSPHATE SERPL-MCNC: 3.2 MG/DL — SIGNIFICANT CHANGE UP (ref 2.5–4.5)
PLATELET # BLD AUTO: 177 K/UL — SIGNIFICANT CHANGE UP (ref 150–400)
PLATELET # BLD AUTO: 177 K/UL — SIGNIFICANT CHANGE UP (ref 150–400)
POTASSIUM SERPL-MCNC: 4.2 MMOL/L — SIGNIFICANT CHANGE UP (ref 3.5–5.3)
POTASSIUM SERPL-MCNC: 4.2 MMOL/L — SIGNIFICANT CHANGE UP (ref 3.5–5.3)
POTASSIUM SERPL-SCNC: 4.2 MMOL/L — SIGNIFICANT CHANGE UP (ref 3.5–5.3)
POTASSIUM SERPL-SCNC: 4.2 MMOL/L — SIGNIFICANT CHANGE UP (ref 3.5–5.3)
RBC # BLD: 3.38 M/UL — LOW (ref 3.8–5.2)
RBC # BLD: 3.38 M/UL — LOW (ref 3.8–5.2)
RBC # FLD: 19 % — HIGH (ref 10.3–14.5)
RBC # FLD: 19 % — HIGH (ref 10.3–14.5)
SODIUM SERPL-SCNC: 135 MMOL/L — SIGNIFICANT CHANGE UP (ref 135–145)
SODIUM SERPL-SCNC: 135 MMOL/L — SIGNIFICANT CHANGE UP (ref 135–145)
WBC # BLD: 14.12 K/UL — HIGH (ref 3.8–10.5)
WBC # BLD: 14.12 K/UL — HIGH (ref 3.8–10.5)
WBC # FLD AUTO: 14.12 K/UL — HIGH (ref 3.8–10.5)
WBC # FLD AUTO: 14.12 K/UL — HIGH (ref 3.8–10.5)

## 2024-01-14 RX ORDER — SODIUM CHLORIDE 9 MG/ML
500 INJECTION, SOLUTION INTRAVENOUS ONCE
Refills: 0 | Status: DISCONTINUED | OUTPATIENT
Start: 2024-01-14 | End: 2024-01-14

## 2024-01-14 RX ORDER — SODIUM CHLORIDE 9 MG/ML
1000 INJECTION, SOLUTION INTRAVENOUS ONCE
Refills: 0 | Status: COMPLETED | OUTPATIENT
Start: 2024-01-14 | End: 2024-01-14

## 2024-01-14 RX ORDER — ACETAMINOPHEN 500 MG
1000 TABLET ORAL EVERY 6 HOURS
Refills: 0 | Status: DISCONTINUED | OUTPATIENT
Start: 2024-01-14 | End: 2024-01-15

## 2024-01-14 RX ADMIN — HEPARIN SODIUM 5000 UNIT(S): 5000 INJECTION INTRAVENOUS; SUBCUTANEOUS at 22:29

## 2024-01-14 RX ADMIN — SODIUM CHLORIDE 1000 MILLILITER(S): 9 INJECTION, SOLUTION INTRAVENOUS at 16:48

## 2024-01-14 RX ADMIN — Medication 5 MILLIGRAM(S): at 06:22

## 2024-01-14 RX ADMIN — CEFTRIAXONE 100 MILLIGRAM(S): 500 INJECTION, POWDER, FOR SOLUTION INTRAMUSCULAR; INTRAVENOUS at 05:10

## 2024-01-14 RX ADMIN — DEXTROSE MONOHYDRATE, SODIUM CHLORIDE, AND POTASSIUM CHLORIDE 125 MILLILITER(S): 50; .745; 4.5 INJECTION, SOLUTION INTRAVENOUS at 10:39

## 2024-01-14 RX ADMIN — Medication 400 MILLIGRAM(S): at 20:05

## 2024-01-14 RX ADMIN — Medication 15 MILLIGRAM(S): at 15:00

## 2024-01-14 RX ADMIN — HYDROMORPHONE HYDROCHLORIDE 1 MILLIGRAM(S): 2 INJECTION INTRAMUSCULAR; INTRAVENOUS; SUBCUTANEOUS at 05:14

## 2024-01-14 RX ADMIN — Medication 15 MILLIGRAM(S): at 08:58

## 2024-01-14 RX ADMIN — PANTOPRAZOLE SODIUM 40 MILLIGRAM(S): 20 TABLET, DELAYED RELEASE ORAL at 12:29

## 2024-01-14 RX ADMIN — HYDROMORPHONE HYDROCHLORIDE 1 MILLIGRAM(S): 2 INJECTION INTRAMUSCULAR; INTRAVENOUS; SUBCUTANEOUS at 00:11

## 2024-01-14 RX ADMIN — SODIUM CHLORIDE 1000 MILLILITER(S): 9 INJECTION, SOLUTION INTRAVENOUS at 15:23

## 2024-01-14 RX ADMIN — HYDROMORPHONE HYDROCHLORIDE 1 MILLIGRAM(S): 2 INJECTION INTRAMUSCULAR; INTRAVENOUS; SUBCUTANEOUS at 17:01

## 2024-01-14 RX ADMIN — Medication 15 MILLIGRAM(S): at 20:30

## 2024-01-14 RX ADMIN — Medication 5 MILLIGRAM(S): at 18:25

## 2024-01-14 RX ADMIN — HYDROMORPHONE HYDROCHLORIDE 1 MILLIGRAM(S): 2 INJECTION INTRAMUSCULAR; INTRAVENOUS; SUBCUTANEOUS at 00:41

## 2024-01-14 RX ADMIN — Medication 15 MILLIGRAM(S): at 02:16

## 2024-01-14 RX ADMIN — Medication 15 MILLIGRAM(S): at 09:20

## 2024-01-14 RX ADMIN — Medication 5 MILLIGRAM(S): at 12:43

## 2024-01-14 RX ADMIN — HYDROMORPHONE HYDROCHLORIDE 1 MILLIGRAM(S): 2 INJECTION INTRAMUSCULAR; INTRAVENOUS; SUBCUTANEOUS at 16:31

## 2024-01-14 RX ADMIN — Medication 15 MILLIGRAM(S): at 20:00

## 2024-01-14 RX ADMIN — Medication 1000 MILLIGRAM(S): at 20:30

## 2024-01-14 RX ADMIN — ONDANSETRON 4 MILLIGRAM(S): 8 TABLET, FILM COATED ORAL at 08:57

## 2024-01-14 RX ADMIN — HEPARIN SODIUM 5000 UNIT(S): 5000 INJECTION INTRAVENOUS; SUBCUTANEOUS at 15:23

## 2024-01-14 RX ADMIN — HYDROMORPHONE HYDROCHLORIDE 1 MILLIGRAM(S): 2 INJECTION INTRAMUSCULAR; INTRAVENOUS; SUBCUTANEOUS at 05:44

## 2024-01-14 RX ADMIN — Medication 15 MILLIGRAM(S): at 14:30

## 2024-01-14 RX ADMIN — SODIUM CHLORIDE 1000 MILLILITER(S): 9 INJECTION, SOLUTION INTRAVENOUS at 09:21

## 2024-01-14 RX ADMIN — HEPARIN SODIUM 5000 UNIT(S): 5000 INJECTION INTRAVENOUS; SUBCUTANEOUS at 05:15

## 2024-01-14 NOTE — PROVIDER CONTACT NOTE (OTHER) - RECOMMENDATIONS
Pain medication
Bolus?
Provider made aware. Will admin Magnesium IVPB
Bolus?
Pt continues to sustain a  low BP. Last reading is 98/53, provider is aware.
Provider made aware
Bolus?
Admin dilaudid 1mg prn as ordered. Hold lopressor.
make provider aware

## 2024-01-14 NOTE — PROVIDER CONTACT NOTE (OTHER) - ASSESSMENT
Pt real catheter checked for kinks. Output adrianna in color. D5+.45 w/ K+ running at 125mL/hr. Pt shows no s/s of bladder distention and denies any discomfort.
Vital signs stable
Pt does not report any lightheadedness
Pt real output 50cc at 0520. Real checked for kinks
VSS, pt satting in the high 90's on room air, slightly dyspneic on exertion when moving from bed to bedside commode. Nasal canula available at bedside as needed. pt educated on  monitoring, indication, and risks
Pt real catheter checked for kinks. Output adrianna in color. D5+.45 w/ K+ running at 125mL/hr. Pt shows no s/s of bladder distention and denies any discomfort.
Pt c/o of 8 out of 10 in abdominal area near surgical incision. Pt scheduled for lopressor 5mg at 0000. /56 HR 90
Pt continues to sustain a  low BP. Last reading is 90/53. Reports no pain or light headiness.
Pt. A&Ox4, ,Bp 115/72, HR 88. Pt educated on indication of medication. Pt still refusing.  Pt refusing another IV access, states been getting stuck every day for a new IV access.

## 2024-01-14 NOTE — PROVIDER CONTACT NOTE (OTHER) - REASON
Low blood pressure
Pt refusing lopressor, and heparin ; Pt has 1 IV access
pt refusing  monitoring
Low BP 97/54
Pt c/o 8/10 pain
Patient vomiting and complaining of pain
Pt has low real output, hypotensive
Pt real output <100
Pt real output 50cc

## 2024-01-14 NOTE — PROVIDER CONTACT NOTE (OTHER) - BACKGROUND
Pt admitted for high grade sbo. Plan for OR 1/12.
Pt continues to sustain a  low BP. Last reading is 90/53
Pt s/p pap smear of cervic with d&c 1/12. s/p diagnostic lap converted to open ex-lap, lysis of adhesions, creation of ileostomy and omenectomy 1/12
Patient admitted for intestinal obstruction.
post op Pt blood pressure is 93/50
dx intestinal obstruction

## 2024-01-14 NOTE — PROGRESS NOTE ADULT - ATTENDING COMMENTS
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  Explained the surgical plan for laparoscopic, possible open exploration, likely resection and likely ileostomy  Will have WOCN julia her  Continue NG tube  EKG and will consult medicine for medical clearance    Wendy Salguero MD  Attending physician
clears  monitor ostomy output  cont IVF hydration  OOB
ngt 400 since OR, + ostomy output    aaox3  abd soft, dressing c/d/i, ileosotmy pink with liquid output    clamp NGT  OOB
Date of service: 1/6/24    Agree with E&M above, NGT if vomiting

## 2024-01-14 NOTE — PROVIDER CONTACT NOTE (OTHER) - ACTION/TREATMENT ORDERED:
Provider made aware. Bladder scan for retention
Provider made aware. 2L Bolus over 2hrs
Reassess pt's BP after the IV Bolus dose of fluids
Provider made aware. Will follow up with day shift
provider aware. education re-enforced. no further orders at this time
provider adviced to take manual BP reading which resulted it 97/54
Admin dilaudid 1mg prn as ordered. Hold lopressor.
Hold off on K+ runs for right now.
Provider ordered IV Tylenol, IV Dilaudid, and STAT x-ray ordered.

## 2024-01-14 NOTE — PROGRESS NOTE ADULT - ASSESSMENT
73F with PMHx of HTN, COPD, fibroids and PSHx of Rt. hemicolectomy (04/2023) presents for abdominal pain, nausea, vomiting and ongoing vaginal bleeding since December 12th. 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     Plan:  - Diet: CLD  - 1L Bolus this AM for low UOP  - Follow-up Ostomy nursing  - Follow-up PT  - Strict I&Os  - Appreciate gyn team input    A-Team  [id number] 73F with PMHx of HTN, COPD, fibroids and PSHx of Rt. hemicolectomy (04/2023) presents for abdominal pain, nausea, vomiting and ongoing vaginal bleeding since December 12th. 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     Plan:  - Diet: CLD  - 1L Bolus this AM for low UOP  - Follow-up Ostomy nursing  - Follow-up PT  - Strict I&Os  - Appreciate gyn team input    A-Team  [id number]

## 2024-01-14 NOTE — PROGRESS NOTE ADULT - SUBJECTIVE AND OBJECTIVE BOX
TEAM [ A ] Surgery Daily Progress Note  =====================================================    SUBJECTIVE: Patient seen and examined at bedside on AM rounds. Patient patient is doing well with pain under control. NG tube removed. Patient with no nausea or vomiting.    PMH:  PAST MEDICAL & SURGICAL HISTORY:  HTN (hypertension)      ALLERGIES:  No Known Allergies    --------------------------------------------------------------------------------------    VITAL SIGNS:  Vital Signs Last 24 Hrs  T(C): 36.9 (14 Jan 2024 05:10), Max: 37.1 (13 Jan 2024 22:00)  T(F): 98.4 (14 Jan 2024 05:10), Max: 98.8 (13 Jan 2024 22:00)  HR: 85 (14 Jan 2024 06:22) (85 - 95)  BP: 113/62 (14 Jan 2024 06:22) (90/53 - 132/71)  BP(mean): --  RR: 17 (14 Jan 2024 05:10) (17 - 18)  SpO2: 99% (14 Jan 2024 05:10) (95% - 100%)    Parameters below as of 14 Jan 2024 05:10  Patient On (Oxygen Delivery Method): room air      --------------------------------------------------------------------------------------    Physical Examination:  General: NAD, resting comfortably in bed.  Respiratory: Nonlabored respirations, normal CW expansion.  Cardio: RRR,   Abdomen: soft, moderately distended, non-tender, laparoscopic surgical incision sites are c/d/i. midline incision covered with pink soaked gauze and Tegaderm with minimal sanguinous fluid contained within Tegaderm dressing inferiorly. Changed on rounds. Has a penrose drain in midline. No gross evidence of bleeding. Ileostomy intact with light brown liquid stool in bag.  : Kitchen in with adrianna urine output.       --------------------------------------------------------------------------------------    LABS                        8.7    14.12 )-----------( 177      ( 14 Jan 2024 06:30 )             28.2       01-13    135  |  101  |  16  ----------------------------<  93  4.5   |  23  |  1.35<H>    Ca    7.7<L>      13 Jan 2024 06:00  Phos  3.4     01-13  Mg     1.70     01-13    --------------------------------------------------------------------------------------    INS AND OUTS:  I&O's Detail    13 Jan 2024 07:01  -  14 Jan 2024 07:00  --------------------------------------------------------  IN:    dextrose 5% + sodium chloride 0.45% w/ Additives: 250 mL    dextrose 5% + sodium chloride 0.45% w/ Additives: 1500 mL    IV PiggyBack: 300 mL    Lactated Ringers Bolus: 1000 mL  Total IN: 3050 mL    OUT:    Ileostomy (mL): 1740 mL    Nasogastric/Oral tube (mL): 75 mL    Oral Fluid: 0 mL    Ureteral Catheter (mL): 525 mL  Total OUT: 2340 mL    Total NET: 710 mL        --------------------------------------------------------------------------------------

## 2024-01-14 NOTE — PROVIDER CONTACT NOTE (OTHER) - SITUATION
Pt c/o of 8 out of 10 in abdominal area near surgical incision. Pt scheduled for lopressor 5mg at 0000. /56 HR 90

## 2024-01-14 NOTE — PROVIDER CONTACT NOTE (OTHER) - DATE AND TIME:
13-Jan-2024 06:24
13-Jan-2024 15:37
14-Jan-2024 05:23
07-Jan-2024 01:55
14-Jan-2024 00:06
12-Jan-2024 05:40
09-Jan-2024 23:00
13-Jan-2024 03:10
13-Jan-2024 11:39

## 2024-01-15 LAB
ANION GAP SERPL CALC-SCNC: 8 MMOL/L — SIGNIFICANT CHANGE UP (ref 7–14)
ANION GAP SERPL CALC-SCNC: 8 MMOL/L — SIGNIFICANT CHANGE UP (ref 7–14)
BUN SERPL-MCNC: 18 MG/DL — SIGNIFICANT CHANGE UP (ref 7–23)
BUN SERPL-MCNC: 18 MG/DL — SIGNIFICANT CHANGE UP (ref 7–23)
CALCIUM SERPL-MCNC: 7.9 MG/DL — LOW (ref 8.4–10.5)
CALCIUM SERPL-MCNC: 7.9 MG/DL — LOW (ref 8.4–10.5)
CHLORIDE SERPL-SCNC: 99 MMOL/L — SIGNIFICANT CHANGE UP (ref 98–107)
CHLORIDE SERPL-SCNC: 99 MMOL/L — SIGNIFICANT CHANGE UP (ref 98–107)
CO2 SERPL-SCNC: 23 MMOL/L — SIGNIFICANT CHANGE UP (ref 22–31)
CO2 SERPL-SCNC: 23 MMOL/L — SIGNIFICANT CHANGE UP (ref 22–31)
CREAT SERPL-MCNC: 0.95 MG/DL — SIGNIFICANT CHANGE UP (ref 0.5–1.3)
CREAT SERPL-MCNC: 0.95 MG/DL — SIGNIFICANT CHANGE UP (ref 0.5–1.3)
EGFR: 63 ML/MIN/1.73M2 — SIGNIFICANT CHANGE UP
EGFR: 63 ML/MIN/1.73M2 — SIGNIFICANT CHANGE UP
GLUCOSE SERPL-MCNC: 92 MG/DL — SIGNIFICANT CHANGE UP (ref 70–99)
GLUCOSE SERPL-MCNC: 92 MG/DL — SIGNIFICANT CHANGE UP (ref 70–99)
HCT VFR BLD CALC: 25.1 % — LOW (ref 34.5–45)
HCT VFR BLD CALC: 25.1 % — LOW (ref 34.5–45)
HGB BLD-MCNC: 7.9 G/DL — LOW (ref 11.5–15.5)
HGB BLD-MCNC: 7.9 G/DL — LOW (ref 11.5–15.5)
HPV HIGH+LOW RISK DNA PNL CVX: SIGNIFICANT CHANGE UP
HPV HIGH+LOW RISK DNA PNL CVX: SIGNIFICANT CHANGE UP
MAGNESIUM SERPL-MCNC: 2 MG/DL — SIGNIFICANT CHANGE UP (ref 1.6–2.6)
MAGNESIUM SERPL-MCNC: 2 MG/DL — SIGNIFICANT CHANGE UP (ref 1.6–2.6)
MCHC RBC-ENTMCNC: 25.8 PG — LOW (ref 27–34)
MCHC RBC-ENTMCNC: 25.8 PG — LOW (ref 27–34)
MCHC RBC-ENTMCNC: 31.5 GM/DL — LOW (ref 32–36)
MCHC RBC-ENTMCNC: 31.5 GM/DL — LOW (ref 32–36)
MCV RBC AUTO: 82 FL — SIGNIFICANT CHANGE UP (ref 80–100)
MCV RBC AUTO: 82 FL — SIGNIFICANT CHANGE UP (ref 80–100)
NRBC # BLD: 0 /100 WBCS — SIGNIFICANT CHANGE UP (ref 0–0)
NRBC # BLD: 0 /100 WBCS — SIGNIFICANT CHANGE UP (ref 0–0)
NRBC # FLD: 0 K/UL — SIGNIFICANT CHANGE UP (ref 0–0)
NRBC # FLD: 0 K/UL — SIGNIFICANT CHANGE UP (ref 0–0)
PHOSPHATE SERPL-MCNC: 2.9 MG/DL — SIGNIFICANT CHANGE UP (ref 2.5–4.5)
PHOSPHATE SERPL-MCNC: 2.9 MG/DL — SIGNIFICANT CHANGE UP (ref 2.5–4.5)
PLATELET # BLD AUTO: 201 K/UL — SIGNIFICANT CHANGE UP (ref 150–400)
PLATELET # BLD AUTO: 201 K/UL — SIGNIFICANT CHANGE UP (ref 150–400)
POTASSIUM SERPL-MCNC: 3.9 MMOL/L — SIGNIFICANT CHANGE UP (ref 3.5–5.3)
POTASSIUM SERPL-MCNC: 3.9 MMOL/L — SIGNIFICANT CHANGE UP (ref 3.5–5.3)
POTASSIUM SERPL-SCNC: 3.9 MMOL/L — SIGNIFICANT CHANGE UP (ref 3.5–5.3)
POTASSIUM SERPL-SCNC: 3.9 MMOL/L — SIGNIFICANT CHANGE UP (ref 3.5–5.3)
RBC # BLD: 3.06 M/UL — LOW (ref 3.8–5.2)
RBC # BLD: 3.06 M/UL — LOW (ref 3.8–5.2)
RBC # FLD: 19.2 % — HIGH (ref 10.3–14.5)
RBC # FLD: 19.2 % — HIGH (ref 10.3–14.5)
SODIUM SERPL-SCNC: 130 MMOL/L — LOW (ref 135–145)
SODIUM SERPL-SCNC: 130 MMOL/L — LOW (ref 135–145)
WBC # BLD: 12.37 K/UL — HIGH (ref 3.8–10.5)
WBC # BLD: 12.37 K/UL — HIGH (ref 3.8–10.5)
WBC # FLD AUTO: 12.37 K/UL — HIGH (ref 3.8–10.5)
WBC # FLD AUTO: 12.37 K/UL — HIGH (ref 3.8–10.5)

## 2024-01-15 RX ORDER — ONDANSETRON 8 MG/1
4 TABLET, FILM COATED ORAL EVERY 8 HOURS
Refills: 0 | Status: DISCONTINUED | OUTPATIENT
Start: 2024-01-15 | End: 2024-01-18

## 2024-01-15 RX ORDER — ACETAMINOPHEN 500 MG
975 TABLET ORAL EVERY 6 HOURS
Refills: 0 | Status: DISCONTINUED | OUTPATIENT
Start: 2024-01-15 | End: 2024-01-18

## 2024-01-15 RX ORDER — PANTOPRAZOLE SODIUM 20 MG/1
40 TABLET, DELAYED RELEASE ORAL
Refills: 0 | Status: DISCONTINUED | OUTPATIENT
Start: 2024-01-15 | End: 2024-01-18

## 2024-01-15 RX ORDER — METOPROLOL TARTRATE 50 MG
12.5 TABLET ORAL
Refills: 0 | Status: DISCONTINUED | OUTPATIENT
Start: 2024-01-15 | End: 2024-01-18

## 2024-01-15 RX ORDER — HYDROMORPHONE HYDROCHLORIDE 2 MG/ML
0.2 INJECTION INTRAMUSCULAR; INTRAVENOUS; SUBCUTANEOUS ONCE
Refills: 0 | Status: DISCONTINUED | OUTPATIENT
Start: 2024-01-15 | End: 2024-01-15

## 2024-01-15 RX ORDER — ONDANSETRON 8 MG/1
4 TABLET, FILM COATED ORAL ONCE
Refills: 0 | Status: COMPLETED | OUTPATIENT
Start: 2024-01-15 | End: 2024-01-15

## 2024-01-15 RX ORDER — IBUPROFEN 200 MG
400 TABLET ORAL EVERY 8 HOURS
Refills: 0 | Status: DISCONTINUED | OUTPATIENT
Start: 2024-01-15 | End: 2024-01-18

## 2024-01-15 RX ORDER — SODIUM CHLORIDE 9 MG/ML
500 INJECTION, SOLUTION INTRAVENOUS ONCE
Refills: 0 | Status: COMPLETED | OUTPATIENT
Start: 2024-01-15 | End: 2024-01-15

## 2024-01-15 RX ORDER — OXYCODONE HYDROCHLORIDE 5 MG/1
2.5 TABLET ORAL EVERY 4 HOURS
Refills: 0 | Status: DISCONTINUED | OUTPATIENT
Start: 2024-01-15 | End: 2024-01-18

## 2024-01-15 RX ORDER — OXYCODONE HYDROCHLORIDE 5 MG/1
5 TABLET ORAL EVERY 4 HOURS
Refills: 0 | Status: DISCONTINUED | OUTPATIENT
Start: 2024-01-15 | End: 2024-01-18

## 2024-01-15 RX ADMIN — SODIUM CHLORIDE 500 MILLILITER(S): 9 INJECTION, SOLUTION INTRAVENOUS at 15:49

## 2024-01-15 RX ADMIN — Medication 12.5 MILLIGRAM(S): at 18:12

## 2024-01-15 RX ADMIN — OXYCODONE HYDROCHLORIDE 5 MILLIGRAM(S): 5 TABLET ORAL at 13:16

## 2024-01-15 RX ADMIN — HEPARIN SODIUM 5000 UNIT(S): 5000 INJECTION INTRAVENOUS; SUBCUTANEOUS at 07:09

## 2024-01-15 RX ADMIN — HYDROMORPHONE HYDROCHLORIDE 1 MILLIGRAM(S): 2 INJECTION INTRAMUSCULAR; INTRAVENOUS; SUBCUTANEOUS at 00:05

## 2024-01-15 RX ADMIN — Medication 975 MILLIGRAM(S): at 12:47

## 2024-01-15 RX ADMIN — ONDANSETRON 4 MILLIGRAM(S): 8 TABLET, FILM COATED ORAL at 14:24

## 2024-01-15 RX ADMIN — Medication 975 MILLIGRAM(S): at 18:41

## 2024-01-15 RX ADMIN — Medication 400 MILLIGRAM(S): at 22:56

## 2024-01-15 RX ADMIN — Medication 975 MILLIGRAM(S): at 12:17

## 2024-01-15 RX ADMIN — HEPARIN SODIUM 5000 UNIT(S): 5000 INJECTION INTRAVENOUS; SUBCUTANEOUS at 22:55

## 2024-01-15 RX ADMIN — PANTOPRAZOLE SODIUM 40 MILLIGRAM(S): 20 TABLET, DELAYED RELEASE ORAL at 07:14

## 2024-01-15 RX ADMIN — Medication 15 MILLIGRAM(S): at 07:09

## 2024-01-15 RX ADMIN — HYDROMORPHONE HYDROCHLORIDE 1 MILLIGRAM(S): 2 INJECTION INTRAMUSCULAR; INTRAVENOUS; SUBCUTANEOUS at 00:30

## 2024-01-15 RX ADMIN — Medication 975 MILLIGRAM(S): at 18:11

## 2024-01-15 RX ADMIN — OXYCODONE HYDROCHLORIDE 5 MILLIGRAM(S): 5 TABLET ORAL at 13:46

## 2024-01-15 RX ADMIN — Medication 400 MILLIGRAM(S): at 02:24

## 2024-01-15 RX ADMIN — Medication 400 MILLIGRAM(S): at 13:59

## 2024-01-15 RX ADMIN — HYDROMORPHONE HYDROCHLORIDE 0.2 MILLIGRAM(S): 2 INJECTION INTRAMUSCULAR; INTRAVENOUS; SUBCUTANEOUS at 15:50

## 2024-01-15 RX ADMIN — Medication 15 MILLIGRAM(S): at 02:24

## 2024-01-15 RX ADMIN — ONDANSETRON 4 MILLIGRAM(S): 8 TABLET, FILM COATED ORAL at 06:02

## 2024-01-15 RX ADMIN — Medication 400 MILLIGRAM(S): at 13:29

## 2024-01-15 RX ADMIN — HYDROMORPHONE HYDROCHLORIDE 0.2 MILLIGRAM(S): 2 INJECTION INTRAMUSCULAR; INTRAVENOUS; SUBCUTANEOUS at 16:20

## 2024-01-15 NOTE — ADVANCED PRACTICE NURSE CONSULT - REASON FOR CONSULT
Patient seen for initial ileostomy teaching. AS per H&P, patient is a 73F with PMHx of HTN, COPD, fibroids and PSHx of Rt. hemicolectomy (04/2023) presented for abdominal pain, nausea, vomiting and ongoing vaginal bleeding since December 12th. 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

## 2024-01-15 NOTE — PROGRESS NOTE ADULT - SUBJECTIVE AND OBJECTIVE BOX
Surgery Progress Note    S: Patient seen and examined. No acute events overnight. Reports tolerating diet without nausea, vomiting, passing flatus, having bowel movements, voiding without issues, have been ambulating and out of bed. Denies fever, chills, SOB, chest pain.     O:  Physical Exam:  Gen: Laying in bed, NAD  HEENT: atrumatic, EMOI  Resp: Unlabored breathing  Abd: soft, leatha-incisional tenderness, nondistended, no rebound or guarding. Drains serosanguinous   Ext: Moves 4 extremities spontaneously    Vital Signs Last 24 Hrs  T(C): 36.6 (15 Eros 2024 05:58), Max: 37.1 (14 Jan 2024 17:00)  T(F): 97.8 (15 Eros 2024 05:58), Max: 98.8 (14 Jan 2024 17:00)  HR: 88 (15 Eros 2024 05:58) (76 - 97)  BP: 108/60 (15 Eros 2024 05:58) (98/56 - 132/68)  BP(mean): --  RR: 17 (15 Eros 2024 05:58) (17 - 18)  SpO2: 99% (15 Eros 2024 05:58) (97% - 100%)    Parameters below as of 15 Eros 2024 05:58  Patient On (Oxygen Delivery Method): room air        I&O's Detail    13 Jan 2024 07:01  -  14 Jan 2024 07:00  --------------------------------------------------------  IN:    dextrose 5% + sodium chloride 0.45% w/ Additives: 250 mL    dextrose 5% + sodium chloride 0.45% w/ Additives: 1500 mL    IV PiggyBack: 300 mL    Lactated Ringers Bolus: 1000 mL  Total IN: 3050 mL    OUT:    Ileostomy (mL): 1740 mL    Nasogastric/Oral tube (mL): 75 mL    Oral Fluid: 0 mL    Ureteral Catheter (mL): 525 mL  Total OUT: 2340 mL    Total NET: 710 mL      14 Jan 2024 07:01  -  15 Eros 2024 06:10  --------------------------------------------------------  IN:    dextrose 5% + sodium chloride 0.45% w/ Additives: 2000 mL    IV PiggyBack: 100 mL    Lactated Ringers Bolus: 2000 mL    Oral Fluid: 640 mL  Total IN: 4740 mL    OUT:    Ileostomy (mL): 1050 mL    Ureteral Catheter (mL): 950 mL  Total OUT: 2000 mL    Total NET: 2740 mL                                8.7    14.12 )-----------( 177      ( 14 Jan 2024 06:30 )             28.2       01-14    135  |  102  |  21  ----------------------------<  82  4.2   |  24  |  1.20    Ca    8.0<L>      14 Jan 2024 06:30  Phos  3.2     01-14  Mg     2.20     01-14         Surgery Progress Note    S:     O:  Physical Exam:  Gen: Laying in bed, NAD  Resp: Unlabored breathing  Abd:   Ext:     Vital Signs Last 24 Hrs  T(C): 36.6 (15 Eros 2024 05:58), Max: 37.1 (14 Jan 2024 17:00)  T(F): 97.8 (15 Eros 2024 05:58), Max: 98.8 (14 Jan 2024 17:00)  HR: 88 (15 Eros 2024 05:58) (76 - 97)  BP: 108/60 (15 Eros 2024 05:58) (98/56 - 132/68)  BP(mean): --  RR: 17 (15 Eros 2024 05:58) (17 - 18)  SpO2: 99% (15 Eros 2024 05:58) (97% - 100%)    Parameters below as of 15 Eros 2024 05:58  Patient On (Oxygen Delivery Method): room air        I&O's Detail    13 Jan 2024 07:01  -  14 Jan 2024 07:00  --------------------------------------------------------  IN:    dextrose 5% + sodium chloride 0.45% w/ Additives: 250 mL    dextrose 5% + sodium chloride 0.45% w/ Additives: 1500 mL    IV PiggyBack: 300 mL    Lactated Ringers Bolus: 1000 mL  Total IN: 3050 mL    OUT:    Ileostomy (mL): 1740 mL    Nasogastric/Oral tube (mL): 75 mL    Oral Fluid: 0 mL    Ureteral Catheter (mL): 525 mL  Total OUT: 2340 mL    Total NET: 710 mL      14 Jan 2024 07:01  -  15 Eros 2024 06:10  --------------------------------------------------------  IN:    dextrose 5% + sodium chloride 0.45% w/ Additives: 2000 mL    IV PiggyBack: 100 mL    Lactated Ringers Bolus: 2000 mL    Oral Fluid: 640 mL  Total IN: 4740 mL    OUT:    Ileostomy (mL): 1050 mL    Ureteral Catheter (mL): 950 mL  Total OUT: 2000 mL    Total NET: 2740 mL                                8.7    14.12 )-----------( 177      ( 14 Jan 2024 06:30 )             28.2       01-14    135  |  102  |  21  ----------------------------<  82  4.2   |  24  |  1.20    Ca    8.0<L>      14 Jan 2024 06:30  Phos  3.2     01-14  Mg     2.20     01-14         Surgery Progress Note    S: Tolerating CLD, no nausea or emesis. Pain is well-controlled.     O:  Physical Exam:  Gen: Laying in bed, NAD  Resp: Unlabored breathing  Abd: Midline incision c/d/i with penrose in place. Dressing changed this AM. Ostomy pink and viable with liquid output in bag.  : Kitchen draining clear yellow urine    Vital Signs Last 24 Hrs  T(C): 36.6 (15 Eros 2024 05:58), Max: 37.1 (14 Jan 2024 17:00)  T(F): 97.8 (15 Eros 2024 05:58), Max: 98.8 (14 Jan 2024 17:00)  HR: 88 (15 Eros 2024 05:58) (76 - 97)  BP: 108/60 (15 Eros 2024 05:58) (98/56 - 132/68)  BP(mean): --  RR: 17 (15 Eros 2024 05:58) (17 - 18)  SpO2: 99% (15 Eros 2024 05:58) (97% - 100%)    Parameters below as of 15 Eros 2024 05:58  Patient On (Oxygen Delivery Method): room air        I&O's Detail    13 Jan 2024 07:01  -  14 Jan 2024 07:00  --------------------------------------------------------  IN:    dextrose 5% + sodium chloride 0.45% w/ Additives: 250 mL    dextrose 5% + sodium chloride 0.45% w/ Additives: 1500 mL    IV PiggyBack: 300 mL    Lactated Ringers Bolus: 1000 mL  Total IN: 3050 mL    OUT:    Ileostomy (mL): 1740 mL    Nasogastric/Oral tube (mL): 75 mL    Oral Fluid: 0 mL    Ureteral Catheter (mL): 525 mL  Total OUT: 2340 mL    Total NET: 710 mL      14 Jan 2024 07:01  -  15 Eros 2024 06:10  --------------------------------------------------------  IN:    dextrose 5% + sodium chloride 0.45% w/ Additives: 2000 mL    IV PiggyBack: 100 mL    Lactated Ringers Bolus: 2000 mL    Oral Fluid: 640 mL  Total IN: 4740 mL    OUT:    Ileostomy (mL): 1050 mL    Ureteral Catheter (mL): 950 mL  Total OUT: 2000 mL    Total NET: 2740 mL                                8.7    14.12 )-----------( 177      ( 14 Jan 2024 06:30 )             28.2       01-14    135  |  102  |  21  ----------------------------<  82  4.2   |  24  |  1.20    Ca    8.0<L>      14 Jan 2024 06:30  Phos  3.2     01-14  Mg     2.20     01-14         Surgery Progress Note    S: Tolerating CLD, no nausea or emesis. Pain is well-controlled.     O:  Physical Exam:  Gen: Laying in bed, NAD  Resp: Unlabored breathing  Abd: Midline incision c/d/i with penrose in place. Dressing changed this AM. Ostomy pink and viable with liquid output in bag.  : Kitchen draining clear yellow urine    Vital Signs Last 24 Hrs  T(C): 36.6 (15 Eros 2024 05:58), Max: 37.1 (14 Jan 2024 17:00)  T(F): 97.8 (15 Eros 2024 05:58), Max: 98.8 (14 Jan 2024 17:00)  HR: 88 (15 Eros 2024 05:58) (76 - 97)  BP: 108/60 (15 Eros 2024 05:58) (98/56 - 132/68)  BP(mean): --  RR: 17 (15 Eros 2024 05:58) (17 - 18)  SpO2: 99% (15 [place] 2024 05:58) (97% - 100%)    Parameters below as of 15 Eros 2024 05:58  Patient On (Oxygen Delivery Method): room air        I&O's Detail    13 Jan 2024 07:01  -  14 Jan 2024 07:00  --------------------------------------------------------  IN:    dextrose 5% + sodium chloride 0.45% w/ Additives: 250 mL    dextrose 5% + sodium chloride 0.45% w/ Additives: 1500 mL    IV PiggyBack: 300 mL    Lactated Ringers Bolus: 1000 mL  Total IN: 3050 mL    OUT:    Ileostomy (mL): 1740 mL    Nasogastric/Oral tube (mL): 75 mL    Oral Fluid: 0 mL    Ureteral Catheter (mL): 525 mL  Total OUT: 2340 mL    Total NET: 710 mL      14 Jan 2024 07:01  -  15 Eros 2024 06:10  --------------------------------------------------------  IN:    dextrose 5% + sodium chloride 0.45% w/ Additives: 2000 mL    IV PiggyBack: 100 mL    Lactated Ringers Bolus: 2000 mL    Oral Fluid: 640 mL  Total IN: 4740 mL    OUT:    Ileostomy (mL): 1050 mL    Ureteral Catheter (mL): 950 mL  Total OUT: 2000 mL    Total NET: 2740 mL                                8.7    14.12 )-----------( 177      ( 14 Jan 2024 06:30 )             28.2       01-14    135  |  102  |  21  ----------------------------<  82  4.2   |  24  |  1.20    Ca    8.0<L>      14 Jan 2024 06:30  Phos  3.2     01-14  Mg     2.20     01-14

## 2024-01-15 NOTE — ADVANCED PRACTICE NURSE CONSULT - RECOMMEDATIONS
Patient placed in 1 piece high output pouch at this time (#58156) but recommend use of 1 piece drainable pouch (#0433) upon discharge.     Supplies recommended:  Skin barrier ring- item # [id number]  One piece drainable pouch- item #[id number]    Ostomy team will continue to follow.  Patient placed in 1 piece high output pouch at this time (#88321) but recommend use of 1 piece drainable pouch (#7829) upon discharge.     Supplies recommended:  Skin barrier ring- item # [id number]  One piece drainable pouch- item #9801    Ostomy team will continue to follow.  Patient placed in 1 piece high output pouch at this time (#35396) but recommend use of 1 piece drainable pouch (#8216) upon discharge.     Supplies recommended:  Skin barrier ring- item # [id number]  One piece drainable pouch- item #3492    Ostomy team will continue to follow.

## 2024-01-15 NOTE — ADVANCED PRACTICE NURSE CONSULT - ASSESSMENT
Patient AxOx4, patient c/o of nausea of abdominal discomfort at this time (primary RN at bedside aware giving anti-nausea medication), but agreeable to pouch change and review of ostomy basics. Overview of surgical procedure and need of ostomy reinforced. Terms defined utilized: Ostomy, Ileostomy, wafer, pouch, stoma. Frequency of pouch change and emptying discussed, teach back method utilized. Stool consistency with ileostomy reviewed. Importance of hydration reinforced.  Patient able to show back how to open, empty and close pouch. Removed Ileostomy pouch using pull and push technique, cleansed skin with water, patted dry. Taught patient how to properly assess stoma viability and peristomal skin. Patient actively participated in stoma measurement, creating template, cutting wafer, applying barrier ring (to protect peristomal skin from enzymatic irritation), applying pouch. Reviewed s/s to report to MD. Showering with and without a pouch discussed as well as dietary restrictions. Informed that visiting nurse would follow up after discharge to set up account for supplies. Printed ostomy educational material provided to patient for review. [facility] Ostomy service contact information provided for any follow up questions/concerns.      Stoma size: 1 1/4" x 1 3/4". See A&I flowsheet for full assessment details.  Patient AxOx4, patient c/o of nausea of abdominal discomfort at this time (primary RN at bedside aware giving anti-nausea medication), but agreeable to pouch change and review of ostomy basics. Overview of surgical procedure and need of ostomy reinforced. Terms defined utilized: Ostomy, Ileostomy, wafer, pouch, stoma. Frequency of pouch change and emptying discussed, teach back method utilized. Stool consistency with ileostomy reviewed. Importance of hydration reinforced.  Patient able to show back how to open, empty and close pouch. Removed Ileostomy pouch using pull and push technique, cleansed skin with water, patted dry. Taught patient how to properly assess stoma viability and peristomal skin. Patient actively participated in stoma measurement, creating template, cutting wafer, applying barrier ring (to protect peristomal skin from enzymatic irritation), applying pouch. Reviewed s/s to report to MD. Showering with and without a pouch discussed as well as dietary restrictions. Informed that visiting nurse would follow up after discharge to set up account for supplies. Printed ostomy educational material provided to patient for review. [facility] Ostomy service contact information provided for any follow up questions/concerns.      Stoma size: 1 1/4" x 1 3/4". See A&I flowsheet for full assessment details.  Patient AxOx4, patient c/o of nausea of abdominal discomfort at this time (primary RN at bedside aware giving anti-nausea medication), but agreeable to pouch change and review of ostomy basics. Overview of surgical procedure and need of ostomy reinforced. Terms defined utilized: Ostomy, Ileostomy, wafer, pouch, stoma. Frequency of pouch change and emptying discussed, teach back method utilized. Stool consistency with ileostomy reviewed. Importance of hydration reinforced.  Patient able to show back how to open, empty and close pouch. Removed Ileostomy pouch using pull and push technique, cleansed skin with water, patted dry. Taught patient how to properly assess stoma viability and peristomal skin. Patient actively participated in stoma measurement, creating template, cutting wafer, applying barrier ring (to protect peristomal skin from enzymatic irritation), applying pouch. Reviewed s/s to report to MD. Showering with and without a pouch discussed as well as dietary restrictions. Informed that visiting nurse would follow up after discharge to set up account for supplies. Printed ostomy educational material provided to patient for review. [facility] Ostomy service contact information provided for any follow up questions/concerns.      Stoma size: 1 1/4" x 1 3/4". See A&I flowsheet for full assessment details.

## 2024-01-15 NOTE — PROGRESS NOTE ADULT - ASSESSMENT
73F with PMHx of HTN, COPD, fibroids and PSHx of Rt. hemicolectomy (04/2023) presents for abdominal pain, nausea, vomiting and ongoing vaginal bleeding since December 12th. 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     Plan:  - Diet: CLD  - Follow-up Ostomy nursing  - Follow-up PT  - Strict I&Os  - Appreciate gyn team input    A-Team  [id number] 73F with PMHx of HTN, COPD, fibroids and PSHx of Rt. hemicolectomy (04/2023) presents for abdominal pain, nausea, vomiting and ongoing vaginal bleeding since December 12th. 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     Plan:  - Diet: CLD  - Follow-up Ostomy nursing  - Follow-up PT  - Strict I&Os  - Appreciate gyn team input    A-Team  [id number] 73F with PMHx of HTN, COPD, fibroids and PSHx of Rt. hemicolectomy (04/2023) presents for abdominal pain, nausea, vomiting and ongoing vaginal bleeding since December 12th. 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     Plan:  - Diet: CLD  - Follow-up Ostomy nursing  - Follow-up PT  - Strict I&Os  - Appreciate gyn team input    A-Team  [id number] 73F with PMHx of HTN, COPD, fibroids and PSHx of Rt. hemicolectomy (04/2023) presents for abdominal pain, nausea, vomiting and ongoing vaginal bleeding since December 12th. 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     Plan:  - Diet: Advance to LRD  - PT reeval  - Dicontinue real, TOV  - Follow-up Ostomy nursing  - Strict I&Os  - Appreciate gyn team input    A-Team  [id number] 73F with PMHx of HTN, COPD, fibroids and PSHx of Rt. hemicolectomy (04/2023) presents for abdominal pain, nausea, vomiting and ongoing vaginal bleeding since December 12th. 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     Plan:  - Diet: Advance to LRD  - PT reeval  - Dicontinue real, TOV  - Follow-up Ostomy nursing  - Strict I&Os  - Appreciate gyn team input    A-Team  [id number] 73F with PMHx of HTN, COPD, fibroids and PSHx of Rt. hemicolectomy (04/2023) presents for abdominal pain, nausea, vomiting and ongoing vaginal bleeding since December 12th. 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     Plan:  - Diet: Advance to LRD  - PT reeval  - Dicontinue real, TOV  - Follow-up Ostomy nursing  - Strict I&Os  - Appreciate gyn team input    A-Team  [id number]

## 2024-01-16 LAB
ANION GAP SERPL CALC-SCNC: 7 MMOL/L — SIGNIFICANT CHANGE UP (ref 7–14)
BUN SERPL-MCNC: 12 MG/DL — SIGNIFICANT CHANGE UP (ref 7–23)
CALCIUM SERPL-MCNC: 8.3 MG/DL — LOW (ref 8.4–10.5)
CHLORIDE SERPL-SCNC: 102 MMOL/L — SIGNIFICANT CHANGE UP (ref 98–107)
CO2 SERPL-SCNC: 23 MMOL/L — SIGNIFICANT CHANGE UP (ref 22–31)
CREAT SERPL-MCNC: 0.88 MG/DL — SIGNIFICANT CHANGE UP (ref 0.5–1.3)
EGFR: 69 ML/MIN/1.73M2 — SIGNIFICANT CHANGE UP
GLUCOSE SERPL-MCNC: 93 MG/DL — SIGNIFICANT CHANGE UP (ref 70–99)
HCT VFR BLD CALC: 25.9 % — LOW (ref 34.5–45)
HGB BLD-MCNC: 8.1 G/DL — LOW (ref 11.5–15.5)
MAGNESIUM SERPL-MCNC: 1.8 MG/DL — SIGNIFICANT CHANGE UP (ref 1.6–2.6)
MCHC RBC-ENTMCNC: 25.4 PG — LOW (ref 27–34)
MCHC RBC-ENTMCNC: 31.3 GM/DL — LOW (ref 32–36)
MCV RBC AUTO: 81.2 FL — SIGNIFICANT CHANGE UP (ref 80–100)
NRBC # BLD: 0 /100 WBCS — SIGNIFICANT CHANGE UP (ref 0–0)
NRBC # FLD: 0 K/UL — SIGNIFICANT CHANGE UP (ref 0–0)
PHOSPHATE SERPL-MCNC: 3.2 MG/DL — SIGNIFICANT CHANGE UP (ref 2.5–4.5)
PLATELET # BLD AUTO: 247 K/UL — SIGNIFICANT CHANGE UP (ref 150–400)
POTASSIUM SERPL-MCNC: 3.9 MMOL/L — SIGNIFICANT CHANGE UP (ref 3.5–5.3)
POTASSIUM SERPL-SCNC: 3.9 MMOL/L — SIGNIFICANT CHANGE UP (ref 3.5–5.3)
RBC # BLD: 3.19 M/UL — LOW (ref 3.8–5.2)
RBC # FLD: 19.1 % — HIGH (ref 10.3–14.5)
SODIUM SERPL-SCNC: 132 MMOL/L — LOW (ref 135–145)
WBC # BLD: 10.11 K/UL — SIGNIFICANT CHANGE UP (ref 3.8–10.5)
WBC # FLD AUTO: 10.11 K/UL — SIGNIFICANT CHANGE UP (ref 3.8–10.5)

## 2024-01-16 RX ORDER — HYDROMORPHONE HYDROCHLORIDE 2 MG/ML
0.2 INJECTION INTRAMUSCULAR; INTRAVENOUS; SUBCUTANEOUS ONCE
Refills: 0 | Status: DISCONTINUED | OUTPATIENT
Start: 2024-01-16 | End: 2024-01-16

## 2024-01-16 RX ORDER — FAMOTIDINE 10 MG/ML
20 INJECTION INTRAVENOUS ONCE
Refills: 0 | Status: COMPLETED | OUTPATIENT
Start: 2024-01-16 | End: 2024-01-16

## 2024-01-16 RX ORDER — MAGNESIUM SULFATE 500 MG/ML
2 VIAL (ML) INJECTION ONCE
Refills: 0 | Status: COMPLETED | OUTPATIENT
Start: 2024-01-16 | End: 2024-01-16

## 2024-01-16 RX ADMIN — Medication 975 MILLIGRAM(S): at 09:07

## 2024-01-16 RX ADMIN — Medication 400 MILLIGRAM(S): at 12:16

## 2024-01-16 RX ADMIN — OXYCODONE HYDROCHLORIDE 5 MILLIGRAM(S): 5 TABLET ORAL at 10:40

## 2024-01-16 RX ADMIN — OXYCODONE HYDROCHLORIDE 5 MILLIGRAM(S): 5 TABLET ORAL at 15:20

## 2024-01-16 RX ADMIN — OXYCODONE HYDROCHLORIDE 5 MILLIGRAM(S): 5 TABLET ORAL at 15:50

## 2024-01-16 RX ADMIN — HYDROMORPHONE HYDROCHLORIDE 0.2 MILLIGRAM(S): 2 INJECTION INTRAMUSCULAR; INTRAVENOUS; SUBCUTANEOUS at 19:04

## 2024-01-16 RX ADMIN — Medication 400 MILLIGRAM(S): at 11:46

## 2024-01-16 RX ADMIN — OXYCODONE HYDROCHLORIDE 5 MILLIGRAM(S): 5 TABLET ORAL at 11:10

## 2024-01-16 RX ADMIN — Medication 975 MILLIGRAM(S): at 08:37

## 2024-01-16 RX ADMIN — ONDANSETRON 4 MILLIGRAM(S): 8 TABLET, FILM COATED ORAL at 13:57

## 2024-01-16 RX ADMIN — FAMOTIDINE 20 MILLIGRAM(S): 10 INJECTION INTRAVENOUS at 11:46

## 2024-01-16 RX ADMIN — HYDROMORPHONE HYDROCHLORIDE 0.2 MILLIGRAM(S): 2 INJECTION INTRAMUSCULAR; INTRAVENOUS; SUBCUTANEOUS at 18:34

## 2024-01-16 NOTE — PROGRESS NOTE ADULT - SUBJECTIVE AND OBJECTIVE BOX
A Team Surgery Daily Progress Note  =====================================================    SUBJECTIVE: Patient seen and examined at bedside on AM rounds. Patient states tolerating diet and pain well controlled. +ostomy function. Received 500cc bolus for low urine output.     ALLERGIES:  No Known Allergies      --------------------------------------------------------------------------------------    MEDICATIONS:    Neurologic Medications  acetaminophen     Tablet .. 975 milliGRAM(s) Oral every 6 hours  ibuprofen  Tablet. 400 milliGRAM(s) Oral every 8 hours  ondansetron   Disintegrating Tablet 4 milliGRAM(s) Oral every 8 hours PRN Nausea and/or Vomiting  oxyCODONE    IR 2.5 milliGRAM(s) Oral every 4 hours PRN Moderate Pain (4 - 6)  oxyCODONE    IR 5 milliGRAM(s) Oral every 4 hours PRN Severe Pain (7 - 10)    Respiratory Medications    Cardiovascular Medications  metoprolol tartrate 12.5 milliGRAM(s) Oral two times a day    Gastrointestinal Medications  pantoprazole    Tablet 40 milliGRAM(s) Oral before breakfast    Genitourinary Medications    Hematologic/Oncologic Medications  heparin   Injectable 5000 Unit(s) SubCutaneous every 8 hours    Antimicrobial/Immunologic Medications    Endocrine/Metabolic Medications    Topical/Other Medications  naloxone Injectable 0.1 milliGRAM(s) IV Push every 3 minutes PRN For ANY of the following changes in patient status:  A. RR LESS THAN 10 breaths per minute, B. Oxygen saturation LESS THAN 90%, C. Sedation score of 6    --------------------------------------------------------------------------------------    VITAL SIGNS:  T(C): 36.7 (01-16-24 @ 05:55), Max: 37 (01-15-24 @ 10:03)  HR: 85 (01-16-24 @ 05:55) (80 - 87)  BP: 116/65 (01-16-24 @ 05:55) (111/63 - 147/59)  RR: 18 (01-16-24 @ 05:55) (17 - 18)  SpO2: 99% (01-16-24 @ 05:55) (96% - 100%)  --------------------------------------------------------------------------------------    INS AND OUTS:    01-15-24 @ 07:01  -  01-16-24 @ 07:00  --------------------------------------------------------  IN: 200 mL / OUT: 1995 mL / NET: -1795 mL      --------------------------------------------------------------------------------------    Physical Exam:  Gen: Laying in bed, NAD  Resp: Unlabored breathing  Abd: Midline incision c/d/i with penrose in place. Dressing changed this AM. Ostomy pink and viable with liquid output in bag.  --------------------------------------------------------------------------------------    LABS

## 2024-01-16 NOTE — CHART NOTE - NSCHARTNOTEFT_GEN_A_CORE
Notified by nurse pt refusing heparin ppx. Discussed risks with pt of not taking prophylactic dose of heparin but pt continues to refuse. Will continue to encourage pt receive medication.

## 2024-01-16 NOTE — PROGRESS NOTE ADULT - ASSESSMENT
Assessment:  73F with PMHx of HTN, COPD, fibroids and PSHx of Rt. hemicolectomy (04/2023) presents for abdominal pain, nausea, vomiting and ongoing vaginal bleeding since December 12th. 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     Plan:  - Diet: LRD  - PT reeval  - Follow-up Ostomy nursing  - Strict I&Os, monitor UO  - Appreciate gyn team input    A-Team  [id number] Assessment:  73F with PMHx of HTN, COPD, fibroids and PSHx of Rt. hemicolectomy (04/2023) presents for abdominal pain, nausea, vomiting and ongoing vaginal bleeding since December 12th. 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     Plan:  - Diet: LRD  - PT reeval  - Follow-up Ostomy nursing  - Strict I&Os, monitor UO  - Appreciate gyn team input    A-Team  [id number]

## 2024-01-17 ENCOUNTER — TRANSCRIPTION ENCOUNTER (OUTPATIENT)
Age: 74
End: 2024-01-17

## 2024-01-17 LAB
ANION GAP SERPL CALC-SCNC: 11 MMOL/L — SIGNIFICANT CHANGE UP (ref 7–14)
BUN SERPL-MCNC: 9 MG/DL — SIGNIFICANT CHANGE UP (ref 7–23)
CALCIUM SERPL-MCNC: 8.4 MG/DL — SIGNIFICANT CHANGE UP (ref 8.4–10.5)
CHLORIDE SERPL-SCNC: 100 MMOL/L — SIGNIFICANT CHANGE UP (ref 98–107)
CO2 SERPL-SCNC: 23 MMOL/L — SIGNIFICANT CHANGE UP (ref 22–31)
CREAT SERPL-MCNC: 0.8 MG/DL — SIGNIFICANT CHANGE UP (ref 0.5–1.3)
EGFR: 78 ML/MIN/1.73M2 — SIGNIFICANT CHANGE UP
GLUCOSE SERPL-MCNC: 88 MG/DL — SIGNIFICANT CHANGE UP (ref 70–99)
HCT VFR BLD CALC: 26.4 % — LOW (ref 34.5–45)
HGB BLD-MCNC: 8.4 G/DL — LOW (ref 11.5–15.5)
MAGNESIUM SERPL-MCNC: 1.9 MG/DL — SIGNIFICANT CHANGE UP (ref 1.6–2.6)
MCHC RBC-ENTMCNC: 25.5 PG — LOW (ref 27–34)
MCHC RBC-ENTMCNC: 31.8 GM/DL — LOW (ref 32–36)
MCV RBC AUTO: 80.2 FL — SIGNIFICANT CHANGE UP (ref 80–100)
NRBC # BLD: 0 /100 WBCS — SIGNIFICANT CHANGE UP (ref 0–0)
NRBC # FLD: 0 K/UL — SIGNIFICANT CHANGE UP (ref 0–0)
PHOSPHATE SERPL-MCNC: 3.1 MG/DL — SIGNIFICANT CHANGE UP (ref 2.5–4.5)
PLATELET # BLD AUTO: 303 K/UL — SIGNIFICANT CHANGE UP (ref 150–400)
POTASSIUM SERPL-MCNC: 4.3 MMOL/L — SIGNIFICANT CHANGE UP (ref 3.5–5.3)
POTASSIUM SERPL-SCNC: 4.3 MMOL/L — SIGNIFICANT CHANGE UP (ref 3.5–5.3)
RBC # BLD: 3.29 M/UL — LOW (ref 3.8–5.2)
RBC # FLD: 19.1 % — HIGH (ref 10.3–14.5)
SODIUM SERPL-SCNC: 134 MMOL/L — LOW (ref 135–145)
WBC # BLD: 7.81 K/UL — SIGNIFICANT CHANGE UP (ref 3.8–10.5)
WBC # FLD AUTO: 7.81 K/UL — SIGNIFICANT CHANGE UP (ref 3.8–10.5)

## 2024-01-17 RX ORDER — MAGNESIUM SULFATE 500 MG/ML
1 VIAL (ML) INJECTION ONCE
Refills: 0 | Status: COMPLETED | OUTPATIENT
Start: 2024-01-17 | End: 2024-01-17

## 2024-01-17 RX ADMIN — Medication 100 GRAM(S): at 11:56

## 2024-01-17 RX ADMIN — Medication 400 MILLIGRAM(S): at 14:13

## 2024-01-17 RX ADMIN — OXYCODONE HYDROCHLORIDE 5 MILLIGRAM(S): 5 TABLET ORAL at 07:36

## 2024-01-17 RX ADMIN — Medication 400 MILLIGRAM(S): at 13:43

## 2024-01-17 RX ADMIN — Medication 975 MILLIGRAM(S): at 18:24

## 2024-01-17 RX ADMIN — Medication 975 MILLIGRAM(S): at 18:54

## 2024-01-17 RX ADMIN — PANTOPRAZOLE SODIUM 40 MILLIGRAM(S): 20 TABLET, DELAYED RELEASE ORAL at 05:07

## 2024-01-17 RX ADMIN — Medication 12.5 MILLIGRAM(S): at 18:24

## 2024-01-17 RX ADMIN — Medication 975 MILLIGRAM(S): at 12:26

## 2024-01-17 RX ADMIN — Medication 975 MILLIGRAM(S): at 11:56

## 2024-01-17 NOTE — DISCHARGE NOTE PROVIDER - NSDCCPCAREPLAN_GEN_ALL_CORE_FT
PRINCIPAL DISCHARGE DIAGNOSIS  Diagnosis: Bowel obstruction  Assessment and Plan of Treatment:      PRINCIPAL DISCHARGE DIAGNOSIS  Diagnosis: Bowel obstruction  Assessment and Plan of Treatment: Wound Care:  Midline abdominal wound: Cleanse with NS, pat dry. Apply Liquid barrier film to periwound skin (allow to dry). Loosely pack area of tunneling with aquacel ribbon, leave 2 inches out to wick. Cover with silicone foam with border. Change daily and PRN if soiled. or nonadherent dressing, change daily and PRN if soiled.   Please keep incisions clean and dry. Please do not Scrub or rub incisions. Do not use lotion or powder on incisions.   BATHING: You may shower and/or sponge bathe. You may use warm soapy water in the shower and rinse, pat dry.  ACTIVITY: No heavy lifting or straining. Otherwise, you may return to your usual level of physical activity. If you are taking narcotic pain medication DO NOT drive a car, operate machinery or make important decisions.  DIET: Return to your usual diet  NOTIFY YOUR SURGEON IF YOU HAVE: any bleeding that does not stop, any pus draining from your wound(s), any fever (over 100.4 F) persistent nausea/vomiting, or if your pain is not controlled on your discharge pain medications, unable to urinate.  FOLLOW UP:  1. Please follow up with your primary care physician in one week regarding your hospitalization, bring copies of your discharge paperwork.  2. Please follow up with your surgeon, Dr. Salguero as an outpatient, please call to schedule appointment   PLease follow up with OB/GYN Dr Obando as an outpatient, please call to schedule appointment        PRINCIPAL DISCHARGE DIAGNOSIS  Diagnosis: Bowel obstruction  Assessment and Plan of Treatment:   Please keep incisions clean and dry. Please do not Scrub or rub incisions. Do not use lotion or powder on incisions.   BATHING: You may You may shower.   Do not sit in water.  You may let water run over incision.  Pat incision dry.  Do not scrub.  Keep incision clean and dry. and/or sponge bathe. You may use warm soapy water in the You may shower.   Do not sit in water.  You may let water run over incision.  Pat incision dry.  Do not scrub.  Keep incision clean and dry. and rinse, pat dry.  ACTIVITY: No heavy lifting or straining. Otherwise, you may return to your usual level of physical activity. If you are taking Do not Do not drive while taking pain medications. or operate heavy machinery or make important decisions while on Do not Do not drive while taking pain medications. or operate heavy machinery or make important decisions while on Do not Do not drive while taking pain medications. or operate heavy machinery or make important decisions while on Do not Do not drive while taking pain medications. or operate heavy machinery or make important decisions while on Do not drive or operate heavy machinery or make important decisions while on narcotic pain medication. pain medication. pain medication. pain medication. pain medication. pain medication DO NOT Do not drive while taking pain medications. a car, operate machinery or make important decisions.  DIET: Return to your usual diet  NOTIFY YOUR SURGEON IF YOU HAVE: any bleeding that does not stop, any pus draining from your wound(s), any fever (over 100.4 F) persistent nausea/vomiting, or if your pain is not controlled on your discharge pain medications, unable to urinate.  FOLLOW UP:  1. Please follow up with your primary care physician in one week regarding your hospitalization, bring copies of your discharge paperwork.  2. Please follow up with your surgeon, Dr. Salguero as an outpatient, please call to schedule appointment   PLease follow up with OB/GYN Dr Obando as an outpatient, please call to schedule appo

## 2024-01-17 NOTE — ADVANCED PRACTICE NURSE CONSULT - RECOMMEDATIONS
Recommended ostomy supplies:  Stoma powder- item #[id number]    Liquid barrier film    Skin barrier ring- item # [id number]  One piece drainable pouch- item #[id number]  One piece high output pouch #[id number]     Topical recommendations:   Midline abdominal wound: Cleanse with NS, pat dry. Apply Liquid barrier film to periwound skin (allow to dry). Loosely pack area of tunneling with aquacel ribbon, leave 2 inches out to wick. Cover with silicone foam with border. Change daily and PRN if soiled. or nonadherent dressing, change daily and PRN if soiled.     Plan discussed with patient and primary RN Km Doshi at bedside.   Please contact Wound/Ostomy Care Service Line if we can be of further assistance (ext [phone]).

## 2024-01-17 NOTE — ADVANCED PRACTICE NURSE CONSULT - ASSESSMENT
Patient reports she has been emptying her pouch independently in the bathroom. Removed Ileostomy pouch, cleansed skin with water, patted dry. Reviewed with patient how to properly assess stoma viability and peristomal skin. Patient actively participated in stoma measurement, creating template, cutting waffer, applying barrier ring (to protect peristomal skin from enzymatic irritation), applying pouch. Demonstrated how to treat denuded peristomal skin with stoma powder and liquid barrier film. Reinforced Importance of hydration and dietary changes.     Midline abdomen- surgical wound [id number], 8cm tunnel at 6 o clock that extends towards 12 o clock. wound bed 100% adipose, small serosanguinous drainage, no odor. Periwound with no erythema, no increased warmth, no edema, no induration, no fluctuance no signs or symptoms of overt skin infection. Goals of care: pack dead space, protect periwound skin, monitor for tissue type changes.     Wound and ostomy care reviewed with patient and primary RN Xavi Doshi at bedside. Patient verbalized understanding

## 2024-01-17 NOTE — DISCHARGE NOTE PROVIDER - NSDCMRMEDTOKEN_GEN_ALL_CORE_FT
acetaminophen 500 mg oral tablet: 2 tab(s) orally every 6 hours  aspirin 81 mg oral tablet, chewable: 1 tab(s) orally once a day  atorvastatin 40 mg oral tablet: 1 tab(s) orally once a day (at bedtime)  Eliquis 2.5 mg oral tablet: 1 tab(s) orally 2 times a day ** DVT prophylaxis postoperatively **  isosorbide dinitrate 10 mg oral tablet: 1 tab(s) orally 3 times a day  metoprolol tartrate 25 mg oral tablet: 0.5 tab(s) orally 2 times a day Please take HALF OF A TABLET (12.5 mg) twice daily  montelukast 10 mg oral tablet: 1 orally once a day  omeprazole 40 mg oral delayed release capsule: 1 orally once a day   acetaminophen 500 mg oral tablet: 2 tab(s) orally every 6 hours as needed for  mild pain  aspirin 81 mg oral tablet, chewable: 1 tab(s) orally once a day  atorvastatin 40 mg oral tablet: 1 tab(s) orally once a day (at bedtime)  Eliquis 2.5 mg oral tablet: 1 tab(s) orally 2 times a day ** DVT prophylaxis postoperatively **  ibuprofen 400 mg oral tablet: 1 tab(s) orally every 8 hours as needed for  mild pain May alternate with Acetaminophen so you are taking one or the other every 3-5 hours as needed for mild pain.  isosorbide dinitrate 10 mg oral tablet: 1 tab(s) orally 3 times a day  metoprolol tartrate 25 mg oral tablet: 0.5 tab(s) orally 2 times a day Please take HALF OF A TABLET (12.5 mg) twice daily  montelukast 10 mg oral tablet: 1 orally once a day  omeprazole 40 mg oral delayed release capsule: 1 orally once a day

## 2024-01-17 NOTE — DISCHARGE NOTE PROVIDER - NSDCCPTREATMENT_GEN_ALL_CORE_FT
PRINCIPAL PROCEDURE  Procedure: Exam under anesthesia, pelvic  Findings and Treatment:       SECONDARY PROCEDURE  Procedure: Dilation and curettage of uterus with endometrial biopsy  Findings and Treatment:      PRINCIPAL PROCEDURE  Procedure: End ileostomy  Findings and Treatment: Open Lysis of Adhesions Partial Resection of Ileocolic Anastamosis, End Ileostomy      SECONDARY PROCEDURE  Procedure: Dilation and curettage of uterus with endometrial biopsy  Findings and Treatment:

## 2024-01-17 NOTE — DISCHARGE NOTE PROVIDER - CARE PROVIDER_API CALL
Wendy Salguero  Colon/Rectal Surgery  [address] [place] [address]  Phone: [phone]  Fax: [phone]  Follow Up Time: 1 week    Catarina Lu  Obstetrics and Gynecology  [address] [place] [address]  Phone: [phone]  Fax: [phone]  Follow Up Time: 1 week

## 2024-01-17 NOTE — PROGRESS NOTE ADULT - SUBJECTIVE AND OBJECTIVE BOX
A Team General Surgery Progress Note    S: Pt seen and examined with team on morning rounds. Patient with emesis x 1 yesterday late afternoon and x 1 overnight. No nausea currently. No emesis this AM. Ileostomy 1 liter last 24 however no air/stool currently (possibly just emptied). +OOB. + voiding.       Vital Signs Last 24 Hrs  T(C): 36.7 (17 Jan 2024 04:50), Max: 37.1 (16 Jan 2024 18:00)  T(F): 98.1 (17 Jan 2024 04:50), Max: 98.7 (16 Jan 2024 18:00)  HR: 87 (17 Jan 2024 04:50) (81 - 89)  BP: 125/76 (17 Jan 2024 04:50) (120/60 - 137/65)  BP(mean): --  RR: 18 (17 Jan 2024 04:50) (17 - 18)  SpO2: 99% (17 Jan 2024 04:50) (98% - 100%)    Parameters below as of 17 Jan 2024 04:50  Patient On (Oxygen Delivery Method): room air        I&O's Summary    16 Jan 2024 07:01  -  17 Jan 2024 07:00  --------------------------------------------------------  IN: 50 mL / OUT: 1605 mL / NET: -1555 mL      I&O's Detail    16 Jan 2024 07:01  -  17 Jan 2024 07:00  --------------------------------------------------------  IN:    Oral Fluid: 50 mL  Total IN: 50 mL    OUT:    Ileostomy (mL): 1205 mL    Voided (mL): 400 mL  Total OUT: 1605 mL    Total NET: -1555 mL          General Appearance: Appears well, NAD  Chest: Breathing comfortably on RA.    CV: S1, S2 @ 87  Abdomen: Soft, nondistended, appropriate incisional tenderness, Midline Dressing/Penrose removed. + minimal open area middle and inferior aspects of wound. New dry sterile dressing place. Ileostomy no air/stool currently however possibly just emptied. +1Liter in last 24 hrs.   Extremities: Moves all extremities.    MEDICATIONS  (STANDING):  acetaminophen     Tablet .. 975 milliGRAM(s) Oral every 6 hours  heparin   Injectable 5000 Unit(s) SubCutaneous every 8 hours  ibuprofen  Tablet. 400 milliGRAM(s) Oral every 8 hours  metoprolol tartrate 12.5 milliGRAM(s) Oral two times a day  pantoprazole    Tablet 40 milliGRAM(s) Oral before breakfast    MEDICATIONS  (PRN):  ondansetron   Disintegrating Tablet 4 milliGRAM(s) Oral every 8 hours PRN Nausea and/or Vomiting  oxyCODONE    IR 2.5 milliGRAM(s) Oral every 4 hours PRN Moderate Pain (4 - 6)  oxyCODONE    IR 5 milliGRAM(s) Oral every 4 hours PRN Severe Pain (7 - 10)      LABS:                        8.1    10.11 )-----------( 247      ( 16 Jan 2024 06:36 )             25.9     01-16    132<L>  |  102  |  12  ----------------------------<  93  3.9   |  23  |  0.88    Ca    8.3<L>      16 Jan 2024 06:36  Phos  3.2     01-16  Mg     1.80     01-16        Urinalysis Basic - ( 16 Jan 2024 06:36 )    Color: x / Appearance: x / SG: x / pH: x  Gluc: 93 mg/dL / Ketone: x  / Bili: x / Urobili: x   Blood: x / Protein: x / Nitrite: x   Leuk Esterase: x / RBC: x / WBC x   Sq Epi: x / Non Sq Epi: x / Bacteria: x

## 2024-01-17 NOTE — DISCHARGE NOTE PROVIDER - HOSPITAL COURSE
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 Patient presented to the ED 1/5/24 for vaginal bleeding on December 12th,2023 and was referred to see a Gynecologist, however, for family restraints she was not able to. The patient reports since December 15th, 2023 she is been having abdominal pain "50/10" in severity globally non-radiating and associated with nausea, vomiting of bilious fluid and watery diarrhea. The diarrhea was mixed with blood, however, she attributed it to the ongoing vaginal bleeding. She also complains of heart racing, palpitations, and shortness of breath.   In the ED, the patient is afebrile, appears tachypneic with about 10cc of bilious vomit. Her laboratory values shows electrolyte derangements, with lactate within normal limits 1.8.  1/5: CT scan shows high-grade SBO with a transition point located in the distal small bowel, just proximal to the ileocolic anastomosis.   1/6: Gastrograffin study, no contrast in colon on x-ray.  1/7: NGT placed to LCWS for decompression remained NPO/IVF.  1/10: Patient medically optimized for OR  1/12: Patient underwent dilatation and curettage of uterus with endometrial biopsies, diagnostic laparoscopy converted to exploratory laparotomy, extensive lysis of adhesions, and creation of end ileostomy with Dr. Obando and Dr. Salguero  1/15: Ostomy nurse provided teaching for ostomy care, diet advanced to regular    Patient seen by physical therapy throughout hospital stay who recommended patient be discharged to home with no PT needs.    At this time patient is tolerating regular diet, ambulating, voiding, GI function at baseline and pain is well controlled.     Per team and attending patient is hemodynamically stable for discharge home and follow up with Dr. Salguero and Dr. Obando outpatient.      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 Patient presented to the ED 1/5/24 for vaginal bleeding on December 12th,2023 and was referred to see a Gynecologist, however, for family restraints she was not able to. The patient reports since December 15th, 2023 she is been having abdominal pain "50/10" in severity globally non-radiating and associated with nausea, vomiting of bilious fluid and watery diarrhea. The diarrhea was mixed with blood, however, she attributed it to the ongoing vaginal bleeding. She also complains of heart racing, palpitations, and shortness of breath.   In the ED, the patient is afebrile, appears tachypneic with about 10cc of bilious vomit. Her laboratory values shows electrolyte derangements, with lactate within normal limits 1.8.  1/5: CT scan shows high-grade SBO with a transition point located in the distal small bowel, just proximal to the ileocolic anastomosis.   1/6: Gastrograffin study, no contrast in colon on x-ray.  1/7: NGT placed to LCWS for decompression remained NPO/IVF.  1/10: Patient medically optimized for OR  1/12: Patient underwent dilatation and curettage of uterus with endometrial biopsies, diagnostic laparoscopy converted to exploratory laparotomy, extensive lysis of adhesions, and creation of end ileostomy with Dr. Obando and Dr. Salguero  1/15: Ostomy nurse provided teaching for ostomy care, diet advanced to regular  1/17: Penrose drain removed. Small 1cm separation of medial portion of wound noted. Wound Care team placed Aquacel dressing.  1/18: On morning rounds wound noted to be widely open about 1.5 inch wide by 1 inch deep. Wound probed and fascia intact. Dry Gauze 4x4 packing Dr. Salguero wanted to place Wound Vac however patient adamant that she is leaving now (Against Medical Advice)     Patient seen by physical therapy throughout hospital stay who recommended patient be discharged to home with no PT needs.    At this time patient is tolerating regular diet, ambulating, voiding, GI function at baseline and pain is well controlled.     Per team and attending patient is not stable for discharge home. Patient signing out AMA. Risks and benefits discussed with patient.      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 Patient presented to the ED 1/5/24 for vaginal bleeding on December 12th,2023 and was referred to see a Gynecologist, however, for family restraints she was not able to. The patient reports since December 15th, 2023 she is been having abdominal pain "50/10" in severity globally non-radiating and associated with nausea, vomiting of bilious fluid and watery diarrhea. The diarrhea was mixed with blood, however, she attributed it to the ongoing vaginal bleeding. She also complains of heart racing, palpitations, and shortness of breath.   In the ED, the patient is afebrile, appears tachypneic with about 10cc of bilious vomit. Her laboratory values shows electrolyte derangements, with lactate within normal limits 1.8.  1/5: CT scan shows high-grade SBO with a transition point located in the distal small bowel, just proximal to the ileocolic anastomosis.   1/6: Gastrograffin study, no contrast in colon on x-ray.  1/7: NGT placed to LCWS for decompression remained NPO/IVF.  1/10: Patient medically optimized for OR  1/12: Patient underwent dilatation and curettage of uterus with endometrial biopsies, diagnostic laparoscopy converted to exploratory laparotomy, extensive lysis of adhesions, and creation of end ileostomy with Dr. Obando and Dr. Salguero  1/15: Ostomy nurse provided teaching for ostomy care, diet advanced to regular  1/17: Penrose drain removed. Small 1cm separation of medial portion of wound noted. Wound Care team placed Aquacel dressing.  1/18: On morning rounds wound noted to be widely open about 1.5 inch wide by 1 inch deep. Wound probed and fascia intact. Dry Gauze 4x4 packing Dr. Salguero wanted to place Wound Vac however patient adamant that she is leaving now (Against Medical Advice)     Patient seen by physical therapy throughout hospital stay who recommended patient be discharged to home with no PT needs.    At this time patient is tolerating regular diet, ambulating, voiding, GI function at baseline and pain is well controlled.     Per team and attending patient is not stable for discharge home. Patient signing out AMA. Risks and benefits discussed with patient.    Extensive conversation had with patient on 1/18/24 (detailed in progress note of that date) and explained it was not safe for her to go home. She insisted on leaving AMA and left the hospital before even signing the paperwork.      Wendy Salguero MD  Attending Physician

## 2024-01-17 NOTE — DISCHARGE NOTE PROVIDER - NSDCHHNEEDSERVICEOTHER_GEN_ALL_CORE_FT
Stoma size: 1 1/4" x 1 3/4"  Skin barrier ring- item # [id number]  One piece drainable pouch- item #8934

## 2024-01-17 NOTE — PROGRESS NOTE ADULT - ASSESSMENT
73F with PMHx of HTN, COPD, fibroids and PSHx of Rt. hemicolectomy (04/2023) presents for abdominal pain, nausea, vomiting and ongoing vaginal bleeding since December 12th. 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     Plan:  - Monitor Nausea/emesis/PO tolerance/ostomy output  - Diet: LRD continue  - PT reeval: No skilled PT needs  - Ostomy nursing - seen 1/15. F/u today  - Strict I&Os, monitor UO  - Appreciate gyn team input    A-Team  [id number]

## 2024-01-17 NOTE — DISCHARGE NOTE PROVIDER - NSDCFUADDINST_GEN_ALL_CORE_FT
WOUND CARE:  Keep wound clean and dry. Ostomy care as taught by nurse prior to discharge.   Stoma size: 1 1/4" x 1 3/4"  Skin barrier ring- item # [id number]  One piece drainable pouch- item #8931  BATHING: Please do not submerge wound underwater. You may shower and/or sponge bathe.  ACTIVITY: No heavy lifting or straining. Otherwise, you may return to your usual level of physical activity. If you are taking narcotic pain medication (such as Percocet) DO NOT drive a car, operate machinery or make important decisions.  DIET: Low fiber diet: Fiber is part of fruits, vegetables, and grains not digested by your body. A low-fiber diet restricts these foods. The goal of this diet is to have fewer, smaller bowel movements.   Avoid these foods:    Nuts, seeds, dried fruit and coconut  Whole grains, popcorn, wheat germ and bran  Brown rice, wild rice, oatmeal, granola, shredded wheat, quinoa, bulgur and barley  Dried beans, baked beans, lima beans, peas and lentils  [place] peanut butter  Fruits and vegetables except those noted below    Choose these foods:    Tender meat, fish and poultry, ham, carrizales, shellfish, and lunch meat  Eggs, tofu and creamy peanut butter  Dairy products if tolerated  White rice and pasta  Baked goods made with refined wheat or rye flour, such as bread, biscuits, pancakes, waffles, bagels, saltines and corina crackers  Hot and cold cereals that have less than 2 grams of dietary fiber in a single serving, such as those made from rice  Canned or well-cooked potatoes, carrots and green beans  Plain tomato sauce  Vegetable and fruit juices  Bananas, melons, applesauce and canned peaches (no skin)  Butter, margarine, oils and salad dressings without seeds   NOTIFY YOUR SURGEON IF: You have any bleeding that does not stop, any pus draining from your wound(s), any fever (over 100.4 F) or chills, persistent nausea/vomiting, persistent diarrhea, or if your pain is not controlled on your discharge pain medications.  FOLLOW-UP: Please follow up with your primary care physician in one week regarding your hospitalization  Patient is signing out AMA. Risks discussed extensively with patient.   MIDLINE WOUND CARE: Remove previous packing. Cleanse wound with normal saline. Pack with dry sterile Kerlex. Cover with 4x4/tape. Change daily.  WOUND CARE:  Keep wound clean and dry. Ostomy care as taught by nurse prior to discharge.   Stoma size: 1 1/4" x 1 3/4"  Skin barrier ring- item # [id number]  One piece drainable pouch- item #8931  BATHING: Please do not submerge wound underwater. You may shower and/or sponge bathe.  ACTIVITY: No heavy lifting or straining. Otherwise, you may return to your usual level of physical activity. If you are taking narcotic pain medication (such as Percocet) DO NOT drive a car, operate machinery or make important decisions.  DIET: Low fiber diet: Fiber is part of fruits, vegetables, and grains not digested by your body. A low-fiber diet restricts these foods. The goal of this diet is to have fewer, smaller bowel movements.   Avoid these foods:    Nuts, seeds, dried fruit and coconut  Whole grains, popcorn, wheat germ and bran  Brown rice, wild rice, oatmeal, granola, shredded wheat, quinoa, bulgur and barley  Dried beans, baked beans, lima beans, peas and lentils  [place] peanut butter  Fruits and vegetables except those noted below    Choose these foods:    Tender meat, fish and poultry, ham, carrizales, shellfish, and lunch meat  Eggs, tofu and creamy peanut butter  Dairy products if tolerated  White rice and pasta  Baked goods made with refined wheat or rye flour, such as bread, biscuits, pancakes, waffles, bagels, saltines and corina crackers  Hot and cold cereals that have less than 2 grams of dietary fiber in a single serving, such as those made from rice  Canned or well-cooked potatoes, carrots and green beans  Plain tomato sauce  Vegetable and fruit juices  Bananas, melons, applesauce and canned peaches (no skin)  Butter, margarine, oils and salad dressings without seeds   NOTIFY YOUR SURGEON IF: You have any bleeding that does not stop, any pus draining from your wound(s), any fever (over 100.4 F) or chills, persistent nausea/vomiting, persistent diarrhea, or if your pain is not controlled on your discharge pain medications.  FOLLOW-UP: Please follow up with your primary care physician in one week regarding your hospitalization

## 2024-01-17 NOTE — DISCHARGE NOTE PROVIDER - NSDCACTIVITY_GEN_ALL_CORE
"GYN Annual Exam     CC - Here for annual exam. Patient is an established patient    Subjective   HPI  Daksha Saldaña is a 31 y.o. female, , who presents for annual well woman exam.   Patient's last menstrual period was 2021 (approximate)..  Periods are regular every 25-35 days, lasting 5 days.   Dysmenorrhea:none.  Patient reports problems with: none.    Partner Status: Marital Status: single.  New Partners since last visit: no.  Desires STD Screening: no.  HPV vaccinated? : unsure     Additional OB/GYN History   Current contraception: contraceptive methods: OCP (estrogen/progesterone)  Desires to: continue contraception    Last Pap : 2021 negative  History of abnormal Pap smear: no    Family history of uterine, colon, breast, or ovarian cancer: yes - [facility] breast ca  Performs monthly Self-Breast Exam: yes  Exercises Regularly:no  Feelings of Anxiety or Depression: no  Tobacco Usage?: No   OB History        2    Para   1    Term   1            AB   1    Living   1       SAB   1    TAB        Ectopic        Molar        Multiple        Live Births   1                Health Maintenance   Topic Date Due   • Annual Gynecologic Pelvic and Breast Exam  Never done   • ANNUAL PHYSICAL  Never done   • COVID-19 Vaccine (1) Never done   • TDAP/TD VACCINES (1 - Tdap) Never done   • HEPATITIS C SCREENING  Never done   • PAP SMEAR  Never done   • INFLUENZA VACCINE  10/01/2021   • Pneumococcal Vaccine 0-64  Aged Out       The additional following portions of the patient's history were reviewed and updated as appropriate: allergies, current medications, past family history, past medical history, past social history, past surgical history and problem list.    Review of Systems   All other systems reviewed and are negative.      I have reviewed and agree with the HPI, ROS, and historical information as entered above. Ramandeep Santamaria MD    Objective   /70   Ht 160 cm (63\")   Wt 93.4 kg (206 lb) "   LMP 07/01/2021 (Approximate)   Breastfeeding No   BMI 36.49 kg/m²     Physical Exam    General:  well developed; obese, no acute distress  Skin:  No suspicious lesions seen  Thyroid: normal to inspection and palpation  Breasts:  Examined in supine position  Symmetric without masses or skin dimpling  Nipples normal without inversion, lesions or discharge  There are no palpable axillary nodes  CVS: RRR, no M/R/G, distal pulses wnl  Resp: CTAB, No W/R/R  Abdomen: soft, non-tender; no masses  no umbilical or inguinal hernias are present  no hepato-splenomegaly  Pelvis: Clinical staff was present for exam  External genitalia:  normal appearance of the external genitalia including Bartholin's and [place]'s glands.  Urethra: no masses or tenderness  Urethral meatus: normal size;  No lesions or signs of prolapse  Bladder: non tender to palpation, no masses, no prolapse  Vagina:  normal pink mucosa without prolapse or lesions.  Cervix:  normal appearance.  Uterus:  normal size, shape and consistency.  Adnexa:  normal bimanual exam of the adnexa.  Perineum/Anus: normal appearance, no external hemorrhoids  Ext: 2+ pulses, no edema    Assessment/Plan     Assessment     Problem List Items Addressed This Visit     Stress incontinence in female    Encounter for gynecological examination with abnormal finding - Primary          1. GYN annual well woman exam.     Plan     1. Reviewed Pap screening guidelines  2. Pap not indicated today  3. OCP's/Patch/Vaginal Ring - Discussed side effects of nausea, BTB, headaches, breast tenderness and slight weight gain in the first three cycles.  Understands risks of blood clots, stroke, and theoretical risk of breast cancer.  Denies family history of blood clots.  4. Reviewed monthly self breast exams.  Instructed to call with lumps, pain, or breast discharge.    5. Reviewed exercise and healthy diet as a preventative health measures.   6. Instructed on [place] exercises and gave patient  educational information.  7. Reviewed/encouraged HPV Vaccination  8. RTC in 1 year or PRN with problems      Ramandeep Santamaria MD  08/17/2021   No heavy lifting/straining/Follow Instructions Provided by your Surgical Team

## 2024-01-18 VITALS
OXYGEN SATURATION: 100 % | TEMPERATURE: 99 F | SYSTOLIC BLOOD PRESSURE: 142 MMHG | DIASTOLIC BLOOD PRESSURE: 62 MMHG | HEART RATE: 93 BPM | RESPIRATION RATE: 18 BRPM

## 2024-01-18 RX ORDER — IBUPROFEN 200 MG
1 TABLET ORAL
Qty: 0 | Refills: 0 | DISCHARGE
Start: 2024-01-18

## 2024-01-18 NOTE — PROGRESS NOTE ADULT - REASON FOR ADMISSION
Small Bowel Obstruction

## 2024-01-18 NOTE — ADVANCED PRACTICE NURSE CONSULT - ASSESSMENT
Patient states she is comfortable with pouch change procedure and has been independently emptying her pouch. Reinforced s/s to report to MD and  Informed that visiting nurse would follow up after discharge to set up account for supplies. [facility] service contact information provided for any follow up questions/concerns. Patient states that she needs to leave, surgery MELI Crow aware and preparing paperwork.

## 2024-01-18 NOTE — ADVANCED PRACTICE NURSE CONSULT - REASON FOR CONSULT
Attempted to see patient for follow up ileostomy teaching however patient already in elevator and signing out AMA. Attempted to see patient for follow up ileostomy teaching however patient already in elevator and trying to sign out AMA with PA at side.

## 2024-01-18 NOTE — PROGRESS NOTE ADULT - ASSESSMENT
73F with PMHx of HTN, COPD, fibroids and PSHx of Rt. hemicolectomy (04/2023) presents for abdominal pain, nausea, vomiting and ongoing vaginal bleeding since December 12th. 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     Plan:  - Patient adamantly refusing to stay one minute longer even to sign AMA paperwork. Risks of leaving AMA without proper wound care (vac) and discharge with medications discussed extensively with patient (including but not limited to wound opening further, decreased wound healing, wound infection, sepsis, dehisence and eviceration, DVT/PE and even death) by Dr. Salguero/Chief Resident and myself and Patient continued to refuse to stay.    A-Team  [id number]

## 2024-01-18 NOTE — DISCHARGE NOTE NURSING/CASE MANAGEMENT/SOCIAL WORK - PATIENT PORTAL LINK FT
You can access the FollowMyHealth Patient Portal offered by [facility] by registering at the following website: [email or web address]. By joining [facility]’s FollowMyHealth portal, you will also be able to view your health information using other applications (apps) compatible with our system.

## 2024-01-18 NOTE — ADVANCED PRACTICE NURSE CONSULT - RECOMMEDATIONS
Patient provided wound care and ostomy supplies to take home.  Recommended ostomy supplies:  Stoma powder- item #[id number]    Liquid barrier film    Skin barrier ring- item # [id number]  One piece drainable pouch- item #[id number]  Please contact Wound/Ostomy Care Service Line if we can be of further assistance (ext [phone]).

## 2024-01-18 NOTE — PROGRESS NOTE ADULT - SUBJECTIVE AND OBJECTIVE BOX
A Team General Surgery Progress Note    S: Pt seen and examined with team on morning rounds. Patient adamant that she wanted to leave hospital this morning. Per Dr. Salguero, patient not stable for discharge as midline wound opened overnight and required Wound Vac Placement at this time. Explained extensively the risks of leaving against medical advice to patient (including but not limited to wound opening further, decreased wound healing, wound infection, sepsis, dehisence and eviceration, and even death) and patient continued to be adamant to leave. Dr. Salguero/chief resident and myself attempted to convince patient to stay for wound vac placement and appropriate care for midline wound (and ostomy and medications) however patient would not change her mind. While getting AMA paperwork, patient left the hospital.     Vital Signs Last 24 Hrs  T(C): 37.4 (18 Jan 2024 05:30), Max: 37.4 (18 Jan 2024 05:30)  T(F): 99.3 (18 Jan 2024 05:30), Max: 99.3 (18 Jan 2024 05:30)  HR: 93 (18 Jan 2024 05:30) (88 - 99)  BP: 142/62 (18 Jan 2024 05:30) (128/78 - 155/83)  BP(mean): --  RR: 18 (18 Jan 2024 05:30) (18 - 18)  SpO2: 100% (18 Jan 2024 05:30) (98% - 100%)    Parameters below as of 18 Jan 2024 05:30  Patient On (Oxygen Delivery Method): room air        I&O's Summary    17 Jan 2024 07:01  -  18 Jan 2024 07:00  --------------------------------------------------------  IN: 0 mL / OUT: 760 mL / NET: -760 mL      I&O's Detail    17 Jan 2024 07:01  -  18 Jan 2024 07:00  --------------------------------------------------------  IN:  Total IN: 0 mL    OUT:    Ileostomy (mL): 760 mL    Oral Fluid: 0 mL    Voided (mL): 0 mL  Total OUT: 760 mL    Total NET: -760 mL          General Appearance: Appears well, NAD  Chest: Breathing comfortably on RA.    CV: S1, S2 @ 93  Abdomen: Soft, nondistended, nontender. Midline wound opened since yesterday now to 1.5 inches wide by 1 inch deep (yesterday was about 1cm wide by 1.5 cm long and superficial). Chief Resident probed wound and fascia intact. Wound packed with dry sterile 4x4, covered with dry sterile 4x4 and tape with plan for wound vac.   Extremities: Moves all extremities.    MEDICATIONS  (STANDING):  acetaminophen     Tablet .. 975 milliGRAM(s) Oral every 6 hours  heparin   Injectable 5000 Unit(s) SubCutaneous every 8 hours  ibuprofen  Tablet. 400 milliGRAM(s) Oral every 8 hours  metoprolol tartrate 12.5 milliGRAM(s) Oral two times a day  pantoprazole    Tablet 40 milliGRAM(s) Oral before breakfast    MEDICATIONS  (PRN):  ondansetron   Disintegrating Tablet 4 milliGRAM(s) Oral every 8 hours PRN Nausea and/or Vomiting  oxyCODONE    IR 2.5 milliGRAM(s) Oral every 4 hours PRN Moderate Pain (4 - 6)  oxyCODONE    IR 5 milliGRAM(s) Oral every 4 hours PRN Severe Pain (7 - 10)      LABS:                        8.4    7.81  )-----------( 303      ( 17 Jan 2024 06:05 )             26.4     01-17    134<L>  |  100  |  9   ----------------------------<  88  4.3   |  23  |  0.80    Ca    8.4      17 Jan 2024 06:05  Phos  3.1     01-17  Mg     1.90     01-17        Urinalysis Basic - ( 17 Jan 2024 06:05 )    Color: x / Appearance: x / SG: x / pH: x  Gluc: 88 mg/dL / Ketone: x  / Bili: x / Urobili: x   Blood: x / Protein: x / Nitrite: x   Leuk Esterase: x / RBC: x / WBC x   Sq Epi: x / Non Sq Epi: x / Bacteria: x

## 2024-01-18 NOTE — PROGRESS NOTE ADULT - NS ATTEND AMEND GEN_ALL_CORE FT
After multiple attempts and with coordination with her nurse able to finally speak to patient through the hospital phone  Explained that since her wound opened up more today compared to yesterday I have serious concerns for the wound healing and recommend a wound vac to help with the healing. She insisted that she couldn't spend another day in the hospital. I explained that her leaving AMA would end badly and she says she understands and that it will just have to be the case. Also explained that we don't have confirmation that VNS is set up for her new ostomy talkless to help with packing her wound and she says she doesn't care and will try to call her insurance herself to sort that out. Made several appears to her to stay but she insisted on having her cousin pick her up and leaving AMA.  Prior to this call made several attempts to call her granddaughter Adore but just kept receiving her full mailbox.  Later received a phone call that the patient left without even signing the AMA paperwork.    Wendy Salguero MD  Attending Physician
Pt reporting some bowel function but still high NG output  No more abdominal pain though    abd soft, obese, mildly distended, non-TTP  well healed midline incision  NG tube clamped    Planning for repeat CT today w/ PO and IV contrast though patient reports that she threw up the contrast when administered  Continue to clamp NG until after CT scan  F/u CT  will likely need surgery for this obstruction    Wendy Salguero MD  Attending Physician

## 2024-01-18 NOTE — PROGRESS NOTE ADULT - PROVIDER SPECIALTY LIST ADULT
Surgery
Pain Medicine
Surgery

## 2024-01-18 NOTE — CHART NOTE - NSCHARTNOTEFT_GEN_A_CORE
Patient seen and examined at bedside. Denies abdominal pain, nausea, vomiting. She is tolerating her diet.   On exam pt midline wound edges noted to be  with wound extending 1-2cm deep in some areas, with 2 small areas down to fascial level.   Abdomen soft, appropriately tender, nondistended.    Discussed with patient plan to place wound vac for midline wound, however pt expressing that she will be going home today and not able to wait for vac placement/set up.   Pt states that she will pack wound herself. Counseled the patient about importance of vac placement and risks of being discharged against medical advice. Discussed the possibility of further complications, including pain, extended healing time,   wound or systemic infection and related symptoms. Pt expressed understanding and said that she will proceed with AMA discharge.     Attending Dr. Salguero also reached out to patient as documented in separate note.       At approximately 9:45 was notified that the patient left her room and headed to hospital lobby before having signed the documentation required for self discharge against medical advice. I went to meet patient in hopes of having further discussion as well as advise her to stay and sign the paperwork, however patient proceed to enter a car and subsequently decided to go home without signing documentation.       Gregoria Abdi, PGY5   A Team Surgery   [id number]

## 2024-01-18 NOTE — DISCHARGE NOTE NURSING/CASE MANAGEMENT/SOCIAL WORK - NSSCNAMETXT_GEN_ALL_CORE
Home care services were arranged with [facility] for start of care the day after discharge. The visiting RN will call to schedule a visit.

## 2024-01-19 ENCOUNTER — NON-APPOINTMENT (OUTPATIENT)
Age: 74
End: 2024-01-19

## 2024-01-19 DIAGNOSIS — R11.0 NAUSEA: ICD-10-CM

## 2024-01-22 LAB — CYTOLOGY SPEC DOC CYTO: SIGNIFICANT CHANGE UP

## 2024-02-13 ENCOUNTER — APPOINTMENT (OUTPATIENT)
Dept: OBGYN | Facility: HOSPITAL | Age: 74
End: 2024-02-13

## 2024-02-13 NOTE — CHART NOTE - NSCHARTNOTEFT_GEN_A_CORE
R4 Chart Note     Pathology called in regards to patients path that was performed on 1/12 from a D&C hysteroscopy. The endometrial curettings were received with the other GI portion of the patients case.   GYN pathology was  from GI pathology to be reviewed by GYN pathologist. At this time, pathology is unsure of the GYN pathologist that is assigned to the case for review.   Dr. Delmar Nicholas ( GI pathology ) contacted for assistance however she is unsure of who was assigned at this time   Explained that a month has passed since the pathology was submitted and there are no results besides the pap that is concerning for malignancy.  Transferred back to main pathology lab who will look more into this case.   GYN spectra link provided for call back with updated information        Also of note the in house GYN team and clinic team have scheduled the above patient for a post operative appointment which she no showed. Another appointment was scheduled however patient could not be reached at the numbers provided in the chart. After multiple attempts, patient responded and notified that she is currently admitted to [facility] with a surgical complication.      Will await call from pathology in regards to this patients case     Case # : [id number]    Pam Reynoso, PGY4

## 2024-02-16 LAB — SURGICAL PATHOLOGY STUDY: SIGNIFICANT CHANGE UP

## 2024-02-27 NOTE — CHART NOTE - NSCHARTNOTEFT_GEN_A_CORE
Pathology called again, in regards to patients path that was performed on 1/12 from a D&C with pap smear. The endometrial curettings were received with the GI portion of the patients case. Pathology office was called 2/13, at that time GYN team was told that the GYN pathology was  from GI pathology to be reviewed by GYN pathologist. At that time, pathology office was unsure of the GYN pathologist that is assigned to the case for review but was told the main pathology lab would look into the case and reach back out to GYN with further information.   Since, the surgical pathology report from the bowel resection has been written by Dr. Delmar Nicholas, with diagnosis of adenocarcinoma. Report posted on 2/13 states endometrial cuttings will be reviewed by Gynecology Pathology division and a separate addendum will be issued by the Gynecology Pathology division. As of today, 2/27, no additional addendum has been posted with the GYN pathology results. Spoke to Carrie in main pathology office who states there is currently no GYN pathologist assigned to the case. She attempted to transfer me to Chief GYN Pathologist, Dr. Marjorie Ramsey, who was unavailable at this time.   Expressed concern regarding length of time without pathology result given cytology report of pap smear is concerning for malignancy and bowel resection with pathology report of adenocarcinoma. Carrie was given inpatient GYN team phone number and states she will contact both Dr. Ramsey and Dr. Nicholas regarding results.      Will await update from pathology office.     MELI Miller  Discussed with inpatient GYN team

## 2024-03-05 NOTE — CHART NOTE - NSCHARTNOTEFT_GEN_A_CORE
Pathology from Dilation and Curettage reviewed. 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 Endometrial polyp: Ecto and endocervical tissue with reactive change.   Multiple attempts made to contact patient at number provided in chart since 2/13/2024. Also attempted to contact emergency contact phone number in chart with no answer. Certified letter sent to address on file to encourage patient to follow up in Gyn-Oncology clinic as soon as possible. Tracking#[id number]    Discussed with GYN Team and Chief of Gynecology MELI Hinojosa

## 2024-03-06 ENCOUNTER — NON-APPOINTMENT (OUTPATIENT)
Age: 74
End: 2024-03-06

## 2024-03-07 ENCOUNTER — NON-APPOINTMENT (OUTPATIENT)
Age: 74
End: 2024-03-07

## 2024-03-11 NOTE — CHART NOTE - NSCHARTNOTEFT_GEN_A_CORE
Multiple unsuccessful attempts made to contact patient and set up GYN Onc appointment for finding of atypical signet ring cell on EMC and atypical glandular cells on pap. Certified letter #[phone] currently in transit. Will discharge patient from GYN follow up list.    d/w GYN attending Dr. America Cedeno, PGY3

## 2024-03-12 NOTE — CHART NOTE - NSCHARTNOTEFT_GEN_A_CORE
GYN team received email from Dr. Wendy Salguero (Colorectal surgery) regarding the patient. Patient has reportedly been readmitted to [facility] twice and has been difficult to get in contact with. Dr. Salguero recommended attempting to contact grand vs. Adore mcclelland ([phone])  - Phone number rang to voicemail and a voicemail was left with clinic call back information    Allan Christensen  PGY-2, Obstetrics & Gynecology GYN team received email from Dr. Wendy Salguero (Colorectal surgery) regarding the patient. Patient has reportedly been readmitted to [facility] twice and has been difficult to get in contact with. Dr. Salguero recommended attempting to contact krystin Adore ([phone])  - Phone number rang to voicemail and a voicemail was left with clinic call back information    Allan Christensen  PGY-2, Obstetrics & Gynecology    d/w Dr. KARI Cross, GYN service attending

## 2024-03-14 ENCOUNTER — NON-APPOINTMENT (OUTPATIENT)
Age: 74
End: 2024-03-14

## 2024-03-14 NOTE — CHART NOTE - NSCHARTNOTESELECT_GEN_ALL_CORE
Event Note
R2 GYN
Chart Note
Event Note
Event Note
GYN Chart Note
GYN Chart Note
Gyn Note
Event Note
Post-op check
Pre-Op Note/Event Note

## 2024-03-14 NOTE — CHART NOTE - NSCHARTNOTEFT_GEN_A_CORE
Called patient's Daughter Shante Garcia (who is assuming care coordination) to discuss results (pathology below) from recent dilation and curettage and need for referral to Gyn Oncology per Dr Cross. Ms Garcia states mother is being discharged from hospital shortly and has Colorectal f/u on 3/19 at 2pm. According to daughter, she is for possible chemotherapy with GI. Encouraged pt to also establish care with Gyn Onc as well given involvement in uterus. GynOnc Navigation emailed as pt desires being seen at [facility].       Discussed with Dr. America Bahena PGY4

## 2024-03-14 NOTE — CHART NOTE - NSCHARTNOTEFT_GEN_A_CORE
0821    Called Adore mcclelland @ ([phone])  - Phone number rang to voicemail and a voicemail was left with clinic call back information    Allan Christensen  PGY-2, Obstetrics & Gynecology    d/w Dr. KARI Cross, GYN service attending

## 2024-03-18 ENCOUNTER — NON-APPOINTMENT (OUTPATIENT)
Age: 74
End: 2024-03-18

## 2024-03-19 ENCOUNTER — APPOINTMENT (OUTPATIENT)
Dept: SURGERY | Facility: CLINIC | Age: 74
End: 2024-03-19

## 2024-03-19 VITALS
HEIGHT: 65 IN | WEIGHT: 202 LBS | BODY MASS INDEX: 33.66 KG/M2 | SYSTOLIC BLOOD PRESSURE: 122 MMHG | HEART RATE: 106 BPM | OXYGEN SATURATION: 99 % | DIASTOLIC BLOOD PRESSURE: 81 MMHG

## 2024-03-19 RX ORDER — ONDANSETRON 4 MG/1
4 TABLET, ORALLY DISINTEGRATING ORAL EVERY 8 HOURS
Qty: 12 | Refills: 0 | Status: COMPLETED | COMMUNITY
Start: 2024-01-19 | End: 2024-03-19

## 2024-04-09 ENCOUNTER — APPOINTMENT (OUTPATIENT)
Dept: SURGERY | Facility: CLINIC | Age: 74
End: 2024-04-09

## 2024-04-24 NOTE — PHYSICAL EXAM
[Exam Deferred] : exam was deferred [JVD] : no jugular venous distention  [Normal Rate and Rhythm] : normal rate and rhythm [Alert] : alert [Oriented to Person] : oriented to person [Oriented to Place] : oriented to place [Oriented to Time] : oriented to time [de-identified] : soft, obese, midline incision almost completely healed with one area still opened to the level of her prior mesh, ostomy appliance in place but significant skin excoriation noted on the abdomen [de-identified] : awake, alert, in NAD, weak appearing [de-identified] : normocephalic, atraumatic, EOMI, nl conjunctiva [de-identified] : b/l chest rise, EWOB on RA [de-identified] : deferred [de-identified] : abd skin as above [de-identified] : weak and in the wheelchair [de-identified] : normal mood and affect

## 2024-04-24 NOTE — PLAN
[TextEntry] : - Reviewed for the daughter and patient her initial pathology report from 2023 and the recent pathology report. Explained she had stage  III colon cancer and needed adjuvant chemotherapy and now has had an anastomotic recurrence w/ local invasion into the duodenum and that she needs chemotherapy. However, she is still very weak and needs to heal more from surgery before she could get chemo - Also explained that she will need repeat imaging to evaluate the current state of things prior to any additional treatment - Will reach out to [facility] team to see if they can help with getting her ostomy supplies, WOCN assistance, home health aide and any other assistance she needs. Explained that if it is taking too long to happen then would recommend coming to the hospital. - RTC in 3-4weeks

## 2024-04-24 NOTE — HISTORY OF PRESENT ILLNESS
[FreeTextEntry1] : Ms. Genaro Kingston is a 73y.o. F with a history of HTN, COPD (worked at [employer], no hx of tobacco use), multiple ventral hernia repairs with mesh and colon cancer presenting for a post-op visit. She originally presented to [facility] in April 2023 with an obstructing cecal mass (had been worked up at an OSH but left there because she was unhappy with the care there). 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 She did well and was discharged home on POD 3. She never came for any of her post-op appointments and was lost to follow-up and thus never received adjuvant chemotherapy. Then she started scheduling appointments to see me in December but never made it to those appointments. She ended up being admitted to [facility] in January 2024 with an SBO due to an anastomotic recurrence and some vaginal bleeding. After failing to open up with NG tube decompression she was taken to the OR on 1/12/24 (along with gyn who performed a D&C for the bleeding) for a laparoscopic converted to open extensive FORTINO, small bowel resection and end ileostomy creation. The entire ileocolic anastomosis couldn't be resected because it appeared to be locally advanced and possibly involving the duodenum. Post-op she had ostomy function (and education) and was tolerating a diet. As she was being set up for discharge she was noted to have some issues with her midline wound and so it was recommended that she stay for management of the wound but she insisted on leaving and left AMA. She then ended up going to [facility] where she had an intraabdominal fluid collection drained and on a 2nd admission there I received a call inquiring about her anatomy as they were planning on operating on her (she states she refused and left there). Her daughter walked into the clinic last week demanding to speak with me because when she went to visit her mom she found her not in a good condition. She was able to finally convince her mom to come today for a post-op. She has been having issues with the ostomy pouch leaking so she has been sparingly using them (so as to not waste them since she is having to pay out of pocket) and she has been weak.

## 2024-04-24 NOTE — ASSESSMENT
[FreeTextEntry1] : 73y.o. F w/ history of stage III colon cancer s/p resection in 4/2023 who didn't follow-up and didn't receive adjuvant chemotherapy who developed an SBO 2/2 an anastomotic recurrence w/ possible involvement of the duodenum now s/p open SBR and end ileostomy creation on 1/12/24 here for a post-op visit.

## (undated) DEVICE — TROCAR APPLIED MEDICAL KII BALLOON BLUNT TIP 12MM X 130MM

## (undated) DEVICE — SUT VICRYL 2-0 18" TIES UNDYED

## (undated) DEVICE — SUCTION YANKAUER OPEN TIP NO VENT CURVE

## (undated) DEVICE — PACK GENERAL LAPAROSCOPY

## (undated) DEVICE — TROCAR COVIDIEN VERSAPORT BLADELESS OPTICAL 5MM STANDARD

## (undated) DEVICE — POSITIONER PINK PAD PIGAZZI SYSTEM

## (undated) DEVICE — STAPLER SKIN MULTI DIRECTION W35

## (undated) DEVICE — POSITIONER FOAM EGG CRATE ULNAR 2PCS (PINK)

## (undated) DEVICE — CLN COAG SCRUBBIE TIP

## (undated) DEVICE — Device

## (undated) DEVICE — TUBING SUCTION NONCONDUCTIVE 6MM X 12FT

## (undated) DEVICE — WARMING BLANKET FULL ADULT

## (undated) DEVICE — PACK MAJOR ABDOMINAL WITH LAP

## (undated) DEVICE — SUT VICRYL 0 36" CT-1 UNDYED

## (undated) DEVICE — ELCTR BOVIE BLADE 3/4" EXTENDED LENGTH 6"

## (undated) DEVICE — ELCTR GROUNDING PAD ADULT COVIDIEN

## (undated) DEVICE — GLV 8 PROTEXIS (CREAM) NEU-THERA

## (undated) DEVICE — LABELS BLANK W PEN

## (undated) DEVICE — SUT VICRYL 0 27" CT-1 UNDYED

## (undated) DEVICE — PACK PERI GYN

## (undated) DEVICE — TROCAR COVIDIEN VERSAONE BLUNT TIP HASSAN 12MM

## (undated) DEVICE — DRSG STERISTRIPS 0.5 X 4"

## (undated) DEVICE — SUT VICRYL 0 27" UR-6

## (undated) DEVICE — ELCTR BOVIE PENCIL SMOKE EVACUATION

## (undated) DEVICE — ELCTR BOVIE TIP BLADE INSULATED 6.5" EDGE

## (undated) DEVICE — LIGASURE BLUNT TIP 37CM

## (undated) DEVICE — POSITIONER STRAP ARMBOARD VELCRO TS-30

## (undated) DEVICE — TUBING OLYMPUS INSUFFLATION

## (undated) DEVICE — BLADE SURGICAL #10 STAINLESS

## (undated) DEVICE — SUT VICRYL 0 18" TIES UNDYED

## (undated) DEVICE — DRAPE IOBAN 23" X 23"

## (undated) DEVICE — PACK ABDOMINAL CLOSURE

## (undated) DEVICE — DRAPE FLUID WARMER 44 X 44"

## (undated) DEVICE — SUT MAXON 1 36" GS-24

## (undated) DEVICE — ANESTHESIA CIRCUIT ADULT HMEF

## (undated) DEVICE — SOL IRR POUR NS 0.9% 1500ML

## (undated) DEVICE — VENODYNE/SCD SLEEVE CALF MEDIUM

## (undated) DEVICE — TUBING INSUFFLATION LAP FILTER 10FT

## (undated) DEVICE — CANISTER DISPOSABLE THIN WALL 3000CC

## (undated) DEVICE — SHEARS HARMONIC ACE 5MM X 36CM CURVED TIP

## (undated) DEVICE — BLADE SURGICAL #15 CARBON

## (undated) DEVICE — SOL IRR POUR H2O 1500ML

## (undated) DEVICE — DRSG TEGADERM 2.5X3"

## (undated) DEVICE — GELPORT LAPAROSCOPIC SYSTEM

## (undated) DEVICE — TUBING IRR SET FOR CYSTOSCOPY 77"

## (undated) DEVICE — DRAPE TOWEL WHITE 17" X 24"

## (undated) DEVICE — VISITEC 4X4

## (undated) DEVICE — SUT SILK 3-0 18" SH (POP-OFF)

## (undated) DEVICE — PROTECTOR HEEL / ELBOW FLUFFY

## (undated) DEVICE — BASIN SET SINGLE

## (undated) DEVICE — TROCAR COVIDIEN VERSAONE FIXATION CANNULA 5MM

## (undated) DEVICE — SPONGE X-RAY 4X8"

## (undated) DEVICE — PACK MAJOR ABDOMINAL W ENDO DRAPE

## (undated) DEVICE — SUT VICRYL 3-0 18" SH UNDYED (POP-OFF)

## (undated) DEVICE — POOLE SUCTION TIP

## (undated) DEVICE — TIP METZENBAUM SCISSOR MONOPOLAR ENDOCUT (ORANGE)

## (undated) DEVICE — SUT PROLENE 2-0 36" SH

## (undated) DEVICE — DRAPE 3/4 SHEET 52X76"

## (undated) DEVICE — SUT PROLENE 2 60" TP-1

## (undated) DEVICE — SUT VICRYL 2-0 27" SH UNDYED

## (undated) DEVICE — NDL COUNTER FOAM AND MAGNET 20-40

## (undated) DEVICE — TUBING STRYKEFLOW II SUCTION / IRRIGATOR

## (undated) DEVICE — SUT VICRYL 3-0 27" SH UNDYED

## (undated) DEVICE — DRSG TELFA 3 X 8

## (undated) DEVICE — GLV 7.5 PROTEXIS (WHITE)

## (undated) DEVICE — DRSG TAPE TRANSPORE 3"